# Patient Record
Sex: FEMALE | Race: WHITE | NOT HISPANIC OR LATINO | Employment: FULL TIME | URBAN - METROPOLITAN AREA
[De-identification: names, ages, dates, MRNs, and addresses within clinical notes are randomized per-mention and may not be internally consistent; named-entity substitution may affect disease eponyms.]

---

## 2017-04-21 RX ORDER — DROSPIRENONE AND ETHINYL ESTRADIOL 0.03MG-3MG
1 KIT ORAL DAILY
Qty: 84 TABLET | Refills: 1 | Status: SHIPPED | OUTPATIENT
Start: 2017-04-21 | End: 2017-09-13 | Stop reason: SDUPTHER

## 2017-04-21 NOTE — TELEPHONE ENCOUNTER
Annual appt is sched for 9-7-17 pt had to switch to another dr cause of insurance. Now pt switched to a different insurance that we take and pt will see dr luis.Please call pt if this can't be refilled pt #   [phone]

## 2017-09-14 RX ORDER — DROSPIRENONE AND ETHINYL ESTRADIOL 0.03MG-3MG
KIT ORAL
Qty: 84 TABLET | Refills: 0 | Status: SHIPPED | OUTPATIENT
Start: 2017-09-14 | End: 2017-12-08 | Stop reason: SDUPTHER

## 2017-09-21 ENCOUNTER — OFFICE VISIT (OUTPATIENT)
Dept: OBSTETRICS AND GYNECOLOGY | Facility: CLINIC | Age: 32
End: 2017-09-21
Payer: COMMERCIAL

## 2017-09-21 VITALS
WEIGHT: 131.06 LBS | DIASTOLIC BLOOD PRESSURE: 60 MMHG | SYSTOLIC BLOOD PRESSURE: 106 MMHG | BODY MASS INDEX: 20.57 KG/M2 | HEIGHT: 67 IN

## 2017-09-21 DIAGNOSIS — Z12.4 ENCOUNTER FOR PAPANICOLAOU SMEAR FOR CERVICAL CANCER SCREENING: ICD-10-CM

## 2017-09-21 DIAGNOSIS — Z01.419 ENCOUNTER FOR GYNECOLOGICAL EXAMINATION: Primary | ICD-10-CM

## 2017-09-21 DIAGNOSIS — Z11.51 SCREENING FOR HPV (HUMAN PAPILLOMAVIRUS): ICD-10-CM

## 2017-09-21 PROCEDURE — 99395 PREV VISIT EST AGE 18-39: CPT | Mod: S$GLB,,, | Performed by: OBSTETRICS & GYNECOLOGY

## 2017-09-21 PROCEDURE — 88175 CYTOPATH C/V AUTO FLUID REDO: CPT

## 2017-09-21 PROCEDURE — 99999 PR PBB SHADOW E&M-EST. PATIENT-LVL II: CPT | Mod: PBBFAC,,, | Performed by: OBSTETRICS & GYNECOLOGY

## 2017-09-21 PROCEDURE — 87624 HPV HI-RISK TYP POOLED RSLT: CPT

## 2017-09-21 NOTE — PROGRESS NOTES
Subjective:       Patient ID: Nini Carter is a 31 y.o. female.    Chief Complaint:  Well Woman (last pap 2016 )      History of Present Illness  - here for annual. No complaints. Doing well on ocps. Aunt was diagnosed with breast cancer and is positive for genetic mutation. Patient is asking if she should be tested.    History reviewed. No pertinent past medical history.    Past Surgical History:   Procedure Laterality Date    WISDOM TOOTH EXTRACTION           Current Outpatient Prescriptions:     OCELLA 3-0.03 mg per tablet, TAKE 1 TABLET DAILY, Disp: 84 tablet, Rfl: 0    Review of patient's allergies indicates:   Allergen Reactions    Codeine        GYN & OB History  Patient's last menstrual period was 2017.   Date of Last Pap: No result found    OB History    Para Term  AB Living   0 0 0 0 0 0   SAB TAB Ectopic Multiple Live Births   0 0 0 0 0             Social History     Social History    Marital status: Single     Spouse name: N/A    Number of children: N/A    Years of education: N/A     Occupational History    Not on file.     Social History Main Topics    Smoking status: Never Smoker    Smokeless tobacco: Never Used    Alcohol use No    Drug use: No    Sexual activity: No     Other Topics Concern    Not on file     Social History Narrative    No narrative on file       Family History   Problem Relation Age of Onset    Breast cancer Paternal Grandmother     Ovarian cancer Maternal Grandmother     Breast cancer Maternal Aunt     Colon cancer Neg Hx        Review of Systems  Review of Systems   Respiratory: Negative for shortness of breath.    Cardiovascular: Negative for chest pain and palpitations.   Gastrointestinal: Negative for blood in stool, nausea and vomiting.   Genitourinary:        - see HPI   Skin: Negative for rash and wound.   Allergic/Immunologic: Negative for immunocompromised state.   Neurological: Negative for dizziness and syncope.  "  Hematological: Negative for adenopathy.   Psychiatric/Behavioral: Negative for behavioral problems.        Objective:     Vitals:    09/21/17 1037   BP: 106/60   Weight: 59.5 kg (131 lb 1 oz)   Height: 5' 7" (1.702 m)       Physical Exam:   Constitutional: She is oriented to person, place, and time. She appears well-developed and well-nourished.        Pulmonary/Chest: Right breast exhibits no mass, no nipple discharge, no skin change, no tenderness and no swelling. Left breast exhibits no mass, no nipple discharge, no skin change, no tenderness and no swelling. Breasts are symmetrical.        Abdominal: Soft. She exhibits no distension. There is no tenderness.     Genitourinary: Vagina normal and uterus normal. There is no tenderness or lesion on the right labia. There is no tenderness or lesion on the left labia. Cervix is normal. Right adnexum displays no mass, no tenderness and no fullness. Left adnexum displays no mass, no tenderness and no fullness. No vaginal discharge found. Additional cervical findings: pap smear done          Musculoskeletal: Moves all extremeties.       Neurological: She is alert and oriented to person, place, and time.     Psychiatric: She has a normal mood and affect.        Assessment/ Plan:     Orders Placed This Encounter    HPV High Risk Genotypes, PCR    Liquid-based pap smear, screening       Nini was seen today for well woman.    Diagnoses and all orders for this visit:    Encounter for gynecological examination    Encounter for Papanicolaou smear for cervical cancer screening  -     Liquid-based pap smear, screening    Screening for HPV (human papillomavirus)  -     HPV High Risk Genotypes, PCR    - discussed BRCA testing and genetic counseling. Would strongly recommend counseling before patient considers lab test. Patient would like to defer for now and will let me know if and when she would like to pursue testing.    Return in about 1 year (around 9/21/2018).  "

## 2017-09-26 LAB
HPV HR 12 DNA CVX QL NAA+PROBE: NEGATIVE
HPV16 DNA SPEC QL NAA+PROBE: NEGATIVE
HPV18 DNA SPEC QL NAA+PROBE: NEGATIVE

## 2017-12-11 RX ORDER — DROSPIRENONE AND ETHINYL ESTRADIOL 0.03MG-3MG
KIT ORAL
Qty: 84 TABLET | Refills: 0 | Status: SHIPPED | OUTPATIENT
Start: 2017-12-11 | End: 2018-02-28 | Stop reason: SDUPTHER

## 2018-03-01 RX ORDER — DROSPIRENONE AND ETHINYL ESTRADIOL 0.03MG-3MG
KIT ORAL
Qty: 84 TABLET | Refills: 0 | Status: SHIPPED | OUTPATIENT
Start: 2018-03-01 | End: 2018-05-23 | Stop reason: SDUPTHER

## 2018-05-31 RX ORDER — DROSPIRENONE AND ETHINYL ESTRADIOL 0.03MG-3MG
KIT ORAL
Qty: 84 TABLET | Refills: 0 | Status: SHIPPED | OUTPATIENT
Start: 2018-05-31 | End: 2018-08-23 | Stop reason: SDUPTHER

## 2018-08-24 RX ORDER — DROSPIRENONE AND ETHINYL ESTRADIOL 0.03MG-3MG
KIT ORAL
Qty: 84 TABLET | Refills: 0 | Status: SHIPPED | OUTPATIENT
Start: 2018-08-24 | End: 2018-10-11 | Stop reason: SDUPTHER

## 2018-10-11 ENCOUNTER — OFFICE VISIT (OUTPATIENT)
Dept: OBSTETRICS AND GYNECOLOGY | Facility: CLINIC | Age: 33
End: 2018-10-11
Payer: COMMERCIAL

## 2018-10-11 VITALS
WEIGHT: 130.06 LBS | HEIGHT: 67 IN | BODY MASS INDEX: 20.41 KG/M2 | DIASTOLIC BLOOD PRESSURE: 60 MMHG | SYSTOLIC BLOOD PRESSURE: 100 MMHG

## 2018-10-11 DIAGNOSIS — Z01.419 ENCOUNTER FOR GYNECOLOGICAL EXAMINATION: Primary | ICD-10-CM

## 2018-10-11 PROCEDURE — 99395 PREV VISIT EST AGE 18-39: CPT | Mod: S$GLB,,, | Performed by: OBSTETRICS & GYNECOLOGY

## 2018-10-11 PROCEDURE — 99999 PR PBB SHADOW E&M-EST. PATIENT-LVL II: CPT | Mod: PBBFAC,,, | Performed by: OBSTETRICS & GYNECOLOGY

## 2018-10-11 RX ORDER — DROSPIRENONE AND ETHINYL ESTRADIOL 0.03MG-3MG
1 KIT ORAL DAILY
Qty: 84 TABLET | Refills: 3 | Status: SHIPPED | OUTPATIENT
Start: 2018-10-11 | End: 2019-09-25 | Stop reason: SDUPTHER

## 2018-10-11 NOTE — PROGRESS NOTES
Subjective:       Patient ID: Nini Carter is a 33 y.o. female.    Chief Complaint:  Well Woman (Annual Exam  ---   Last Pap/Hpv 17, Negative  )      History of Present Illness  - here for annual. No complaints. Wedding is next month!    Past Medical History:   Diagnosis Date    Oral contraceptive use        Past Surgical History:   Procedure Laterality Date    WISDOM TOOTH EXTRACTION           Current Outpatient Medications:     OCELLA 3-0.03 mg per tablet, Take 1 tablet by mouth once daily., Disp: 84 tablet, Rfl: 3    Review of patient's allergies indicates:   Allergen Reactions    Codeine Nausea And Vomiting and Anxiety       GYN & OB History  Patient's last menstrual period was 10/04/2018 (exact date).   Date of Last Pap: 2017    OB History    Para Term  AB Living   0 0 0 0 0 0   SAB TAB Ectopic Multiple Live Births   0 0 0 0 0         Obstetric Comments   Menarche 11       Social History     Socioeconomic History    Marital status: Single     Spouse name: Not on file    Number of children: Not on file    Years of education: Not on file    Highest education level: Not on file   Social Needs    Financial resource strain: Not on file    Food insecurity - worry: Not on file    Food insecurity - inability: Not on file    Transportation needs - medical: Not on file    Transportation needs - non-medical: Not on file   Occupational History    Not on file   Tobacco Use    Smoking status: Never Smoker    Smokeless tobacco: Never Used   Substance and Sexual Activity    Alcohol use: Yes     Frequency: Monthly or less     Drinks per session: 1 or 2     Binge frequency: Never    Drug use: No    Sexual activity: Not Currently     Birth control/protection: OCP     Comment: Engaged:  wedding 11-   Other Topics Concern    Not on file   Social History Narrative    Not on file       Family History   Problem Relation Age of Onset    Breast cancer Paternal Grandmother      "Cancer Paternal Grandmother     Ovarian cancer Maternal Grandmother     Cancer Maternal Grandmother     Breast cancer Maternal Aunt     Cancer Maternal Aunt     Colon cancer Neg Hx        Review of Systems  Review of Systems   Respiratory: Negative for shortness of breath.    Cardiovascular: Negative for chest pain and palpitations.   Gastrointestinal: Negative for blood in stool, nausea and vomiting.   Genitourinary:        - see HPI   Skin: Negative for rash and wound.   Allergic/Immunologic: Negative for immunocompromised state.   Neurological: Negative for dizziness and syncope.   Hematological: Negative for adenopathy.   Psychiatric/Behavioral: Negative for behavioral problems.        Objective:     Vitals:    10/11/18 1030   BP: 100/60   Weight: 59 kg (130 lb 1.1 oz)   Height: 5' 7" (1.702 m)       Physical Exam:   Constitutional: She is oriented to person, place, and time. She appears well-developed and well-nourished.        Pulmonary/Chest: Right breast exhibits no mass, no nipple discharge, no skin change, no tenderness and no swelling. Left breast exhibits no mass, no nipple discharge, no skin change, no tenderness and no swelling. Breasts are symmetrical.        Abdominal: Soft. She exhibits no distension. There is no tenderness.     Genitourinary: Vagina normal and uterus normal. There is no tenderness or lesion on the right labia. There is no tenderness or lesion on the left labia. Cervix is normal. Right adnexum displays no mass, no tenderness and no fullness. Left adnexum displays no mass, no tenderness and no fullness. No vaginal discharge found.           Musculoskeletal: Moves all extremeties.       Neurological: She is alert and oriented to person, place, and time.     Psychiatric: She has a normal mood and affect.        Assessment/ Plan:     Orders Placed This Encounter    OCELLA 3-0.03 mg per tablet       Nini was seen today for well woman.    Diagnoses and all orders for this " visit:    Encounter for gynecological examination    Other orders  -     OCELLA 3-0.03 mg per tablet; Take 1 tablet by mouth once daily.    - discussed deferring genetic counseling for BRCA for now. Wedding is next month. May want children soon.    Follow-up in about 1 year (around 10/11/2019) for annual exam.

## 2019-09-26 RX ORDER — DROSPIRENONE AND ETHINYL ESTRADIOL 0.03MG-3MG
KIT ORAL
Qty: 84 TABLET | Refills: 4 | Status: SHIPPED | OUTPATIENT
Start: 2019-09-26 | End: 2019-10-17 | Stop reason: SDUPTHER

## 2019-10-17 ENCOUNTER — OFFICE VISIT (OUTPATIENT)
Dept: OBSTETRICS AND GYNECOLOGY | Facility: CLINIC | Age: 34
End: 2019-10-17
Payer: COMMERCIAL

## 2019-10-17 VITALS
WEIGHT: 128.31 LBS | BODY MASS INDEX: 20.14 KG/M2 | HEIGHT: 67 IN | DIASTOLIC BLOOD PRESSURE: 60 MMHG | SYSTOLIC BLOOD PRESSURE: 112 MMHG

## 2019-10-17 DIAGNOSIS — Z01.419 ENCOUNTER FOR GYNECOLOGICAL EXAMINATION: Primary | ICD-10-CM

## 2019-10-17 PROCEDURE — 99999 PR PBB SHADOW E&M-EST. PATIENT-LVL III: ICD-10-PCS | Mod: PBBFAC,,, | Performed by: OBSTETRICS & GYNECOLOGY

## 2019-10-17 PROCEDURE — 99395 PR PREVENTIVE VISIT,EST,18-39: ICD-10-PCS | Mod: S$GLB,,, | Performed by: OBSTETRICS & GYNECOLOGY

## 2019-10-17 PROCEDURE — 99999 PR PBB SHADOW E&M-EST. PATIENT-LVL III: CPT | Mod: PBBFAC,,, | Performed by: OBSTETRICS & GYNECOLOGY

## 2019-10-17 PROCEDURE — 99395 PREV VISIT EST AGE 18-39: CPT | Mod: S$GLB,,, | Performed by: OBSTETRICS & GYNECOLOGY

## 2019-10-17 RX ORDER — DROSPIRENONE AND ETHINYL ESTRADIOL 0.03MG-3MG
1 KIT ORAL DAILY
Qty: 84 TABLET | Refills: 3 | Status: SHIPPED | OUTPATIENT
Start: 2019-10-17 | End: 2020-04-13 | Stop reason: SDUPTHER

## 2019-10-17 NOTE — PROGRESS NOTES
Subjective:       Patient ID: Nini Carter is a 34 y.o. female.    Chief Complaint:  Well Woman (pap nl/hpv- 2017  declines baseline mammogram)      History of Present Illness  - here for annual. Declines baseline mammogram. Asking if thermogram is a reasonable alternative.  -  for a year! Considering pregnancy this next year.    Past Medical History:   Diagnosis Date    Oral contraceptive use        Past Surgical History:   Procedure Laterality Date    WISDOM TOOTH EXTRACTION           Current Outpatient Medications:     OCELLA 3-0.03 mg per tablet, Take 1 tablet by mouth once daily., Disp: 84 tablet, Rfl: 3    Review of patient's allergies indicates:   Allergen Reactions    Codeine Nausea And Vomiting and Anxiety       GYN & OB History  Patient's last menstrual period was 10/03/2019.   Date of Last Pap: 2017    OB History    Para Term  AB Living   0 0 0 0 0 0   SAB TAB Ectopic Multiple Live Births   0 0 0 0 0   Obstetric Comments   Menarche 11       Social History     Socioeconomic History    Marital status: Single     Spouse name: Not on file    Number of children: Not on file    Years of education: Not on file    Highest education level: Not on file   Occupational History    Not on file   Social Needs    Financial resource strain: Not on file    Food insecurity:     Worry: Not on file     Inability: Not on file    Transportation needs:     Medical: Not on file     Non-medical: Not on file   Tobacco Use    Smoking status: Never Smoker    Smokeless tobacco: Never Used   Substance and Sexual Activity    Alcohol use: Yes     Frequency: Monthly or less     Drinks per session: 1 or 2     Binge frequency: Never    Drug use: No    Sexual activity: Yes     Birth control/protection: OCP     Comment: Engaged:  wedding 11-   Lifestyle    Physical activity:     Days per week: Not on file     Minutes per session: Not on file    Stress: Not on file   Relationships     "Social connections:     Talks on phone: Not on file     Gets together: Not on file     Attends Mosque service: Not on file     Active member of club or organization: Not on file     Attends meetings of clubs or organizations: Not on file     Relationship status: Not on file   Other Topics Concern    Not on file   Social History Narrative    Not on file       Family History   Problem Relation Age of Onset    Breast cancer Paternal Grandmother     Cancer Paternal Grandmother     Ovarian cancer Maternal Grandmother     Cancer Maternal Grandmother     Breast cancer Maternal Aunt     Cancer Maternal Aunt     Colon cancer Neg Hx        Review of Systems  Review of Systems   Respiratory: Negative for shortness of breath.    Cardiovascular: Negative for chest pain and palpitations.   Gastrointestinal: Negative for blood in stool, nausea and vomiting.   Genitourinary:        - see HPI   Skin: Negative for rash and wound.   Allergic/Immunologic: Negative for immunocompromised state.   Neurological: Negative for dizziness and syncope.   Hematological: Negative for adenopathy.   Psychiatric/Behavioral: Negative for behavioral problems.        Objective:     Vitals:    10/17/19 0927   BP: 112/60   Weight: 58.2 kg (128 lb 4.9 oz)   Height: 5' 7" (1.702 m)       Physical Exam:   Constitutional: She is oriented to person, place, and time. She appears well-developed and well-nourished.        Pulmonary/Chest: Right breast exhibits no mass, no nipple discharge, no skin change, no tenderness and no swelling. Left breast exhibits no mass, no nipple discharge, no skin change, no tenderness and no swelling. Breasts are symmetrical.        Abdominal: Soft. She exhibits no distension. There is no tenderness.     Genitourinary: Vagina normal and uterus normal. There is no tenderness or lesion on the right labia. There is no tenderness or lesion on the left labia. Cervix is normal. Right adnexum displays no mass, no tenderness " and no fullness. Left adnexum displays no mass, no tenderness and no fullness. No vaginal discharge found.           Musculoskeletal: Moves all extremeties.       Neurological: She is alert and oriented to person, place, and time.     Psychiatric: She has a normal mood and affect.        Assessment/ Plan:     Orders Placed This Encounter    OCELLA 3-0.03 mg per tablet       Nini was seen today for well woman.    Diagnoses and all orders for this visit:    Encounter for gynecological examination    Other orders  -     OCELLA 3-0.03 mg per tablet; Take 1 tablet by mouth once daily.    - discussed Rubella immunity. Offered test to confirm.  - start PNV daily 3 months prior to stopping birth control method.  - discussed [employer] for genetic carrier screening and gave pamphlet.   - discussed checking cdc.gov for travel restrictions with Zika.  - discussed AMA    - discussed how thermogram is not an approved alternative for mammogram.    Follow up in about 1 year (around 10/17/2020) for annual exam.

## 2019-10-22 ENCOUNTER — TELEPHONE (OUTPATIENT)
Dept: OBSTETRICS AND GYNECOLOGY | Facility: CLINIC | Age: 34
End: 2019-10-22

## 2019-10-22 NOTE — TELEPHONE ENCOUNTER
Pt called to schedule the genetic carrier testing and they said there are a few different options and she would like to know which specific one she is supposed to be scheduling for.

## 2020-04-13 RX ORDER — DROSPIRENONE AND ETHINYL ESTRADIOL 0.03MG-3MG
1 KIT ORAL DAILY
Qty: 84 TABLET | Refills: 1 | Status: SHIPPED | OUTPATIENT
Start: 2020-04-13 | End: 2020-11-04

## 2020-04-13 NOTE — TELEPHONE ENCOUNTER
Pt last annual was 10/17/19 and requesting Rx to be sent to mail order pharmacy. Please review and sign

## 2020-11-04 ENCOUNTER — OFFICE VISIT (OUTPATIENT)
Dept: OBSTETRICS AND GYNECOLOGY | Facility: CLINIC | Age: 35
End: 2020-11-04
Attending: OBSTETRICS & GYNECOLOGY

## 2020-11-04 VITALS
WEIGHT: 127.88 LBS | BODY MASS INDEX: 20.07 KG/M2 | DIASTOLIC BLOOD PRESSURE: 78 MMHG | HEIGHT: 67 IN | TEMPERATURE: 98 F | SYSTOLIC BLOOD PRESSURE: 110 MMHG

## 2020-11-04 DIAGNOSIS — Z01.419 ENCOUNTER FOR GYNECOLOGICAL EXAMINATION: Primary | ICD-10-CM

## 2020-11-04 DIAGNOSIS — Z11.51 ENCOUNTER FOR SCREENING FOR HUMAN PAPILLOMAVIRUS (HPV): ICD-10-CM

## 2020-11-04 DIAGNOSIS — Z12.4 ENCOUNTER FOR PAPANICOLAOU SMEAR FOR CERVICAL CANCER SCREENING: ICD-10-CM

## 2020-11-04 PROCEDURE — 88175 CYTOPATH C/V AUTO FLUID REDO: CPT

## 2020-11-04 PROCEDURE — 99999 PR PBB SHADOW E&M-EST. PATIENT-LVL III: ICD-10-PCS | Mod: PBBFAC,,, | Performed by: OBSTETRICS & GYNECOLOGY

## 2020-11-04 PROCEDURE — 99395 PR PREVENTIVE VISIT,EST,18-39: ICD-10-PCS | Mod: S$PBB,,, | Performed by: OBSTETRICS & GYNECOLOGY

## 2020-11-04 PROCEDURE — 87624 HPV HI-RISK TYP POOLED RSLT: CPT

## 2020-11-04 PROCEDURE — 99395 PREV VISIT EST AGE 18-39: CPT | Mod: S$PBB,,, | Performed by: OBSTETRICS & GYNECOLOGY

## 2020-11-04 PROCEDURE — 99999 PR PBB SHADOW E&M-EST. PATIENT-LVL III: CPT | Mod: PBBFAC,,, | Performed by: OBSTETRICS & GYNECOLOGY

## 2020-11-04 PROCEDURE — 99213 OFFICE O/P EST LOW 20 MIN: CPT | Mod: PBBFAC,PN | Performed by: OBSTETRICS & GYNECOLOGY

## 2020-11-04 NOTE — PROGRESS NOTES
LMP: Patient's last menstrual period was 10/22/2020..    Contraception: The current method of family planning is none  Meds per MD: none    Last Pap: 9- pap & hpv negative

## 2020-11-04 NOTE — PROGRESS NOTES
Subjective:       Patient ID: Nini Carter is a 35 y.o. female.    Chief Complaint:  Well Woman (Annual Exam, Last pap/hpv 2017 negative. off ocp's this month to try for pregnancy.)      History of Present Illness  - here for annual. Stopped ocps about 3 weeks ago. Taking PNV. Bought an opk.    Past Medical History:   Diagnosis Date    Oral contraceptive use        Past Surgical History:   Procedure Laterality Date    WISDOM TOOTH EXTRACTION         No current outpatient medications on file.    Review of patient's allergies indicates:   Allergen Reactions    Codeine Nausea And Vomiting and Anxiety       GYN & OB History  Patient's last menstrual period was 10/22/2020.   Date of Last Pap: No result found    OB History    Para Term  AB Living   0 0 0 0 0 0   SAB TAB Ectopic Multiple Live Births   0 0 0 0 0   Obstetric Comments   Menarche 11       Social History     Socioeconomic History    Marital status: Single     Spouse name: Not on file    Number of children: Not on file    Years of education: Not on file    Highest education level: Not on file   Occupational History    Not on file   Social Needs    Financial resource strain: Not on file    Food insecurity     Worry: Not on file     Inability: Not on file    Transportation needs     Medical: Not on file     Non-medical: Not on file   Tobacco Use    Smoking status: Never Smoker    Smokeless tobacco: Never Used   Substance and Sexual Activity    Alcohol use: Yes     Frequency: Monthly or less     Drinks per session: 1 or 2     Binge frequency: Never    Drug use: No    Sexual activity: Yes     Birth control/protection: None     Comment: Engaged:  wedding 11-   Lifestyle    Physical activity     Days per week: Not on file     Minutes per session: Not on file    Stress: Not on file   Relationships    Social connections     Talks on phone: Not on file     Gets together: Not on file     Attends Islam service: Not on  "file     Active member of club or organization: Not on file     Attends meetings of clubs or organizations: Not on file     Relationship status: Not on file   Other Topics Concern    Not on file   Social History Narrative    Not on file       Family History   Problem Relation Age of Onset    Breast cancer Paternal Grandmother     Cancer Paternal Grandmother     Ovarian cancer Maternal Grandmother     Cancer Maternal Grandmother     Breast cancer Maternal Aunt     Cancer Maternal Aunt     Colon cancer Neg Hx        Review of Systems  Review of Systems   Respiratory: Negative for shortness of breath.    Cardiovascular: Negative for chest pain and palpitations.   Gastrointestinal: Negative for blood in stool, nausea and vomiting.   Genitourinary:        - see HPI   Skin: Negative for rash and wound.   Allergic/Immunologic: Negative for immunocompromised state.   Neurological: Negative for dizziness and syncope.   Hematological: Negative for adenopathy.   Psychiatric/Behavioral: Negative for behavioral problems.        Objective:     Vitals:    11/04/20 1330   BP: 110/78   Temp: 97.7 °F (36.5 °C)   Weight: 58 kg (127 lb 13.9 oz)   Height: 5' 7" (1.702 m)       Physical Exam:   Constitutional: She is oriented to person, place, and time. She appears well-developed and well-nourished.        Pulmonary/Chest: Right breast exhibits no mass, no nipple discharge, no skin change, no tenderness and no swelling. Left breast exhibits no mass, no nipple discharge, no skin change, no tenderness and no swelling. Breasts are symmetrical.        Abdominal: Soft. She exhibits no distension. There is no abdominal tenderness.     Genitourinary:    Vagina and uterus normal.   There is no tenderness or lesion on the right labia. There is no tenderness or lesion on the left labia. Cervix is normal. Right adnexum displays no mass, no tenderness and no fullness. Left adnexum displays no mass, no tenderness and no fullness. Additional " cervical findings: pap smear donenegative for vaginal discharge          Musculoskeletal: Moves all extremeties.       Neurological: She is alert and oriented to person, place, and time.     Psychiatric: She has a normal mood and affect.        Assessment/ Plan:     Orders Placed This Encounter    HPV High Risk Genotypes, PCR    Liquid-Based Pap Smear, Screening       Nini was seen today for well woman.    Diagnoses and all orders for this visit:    Encounter for gynecological examination    Encounter for screening for human papillomavirus (HPV)  -     HPV High Risk Genotypes, PCR    Encounter for Papanicolaou smear for cervical cancer screening  -     Liquid-Based Pap Smear, Screening    - discussed timing of ovulation. Patient will take a UPT if misses her period and call me. She'll keep me posted. Discussed how we would start investigating if not pregnant after 6 months of trying since she's older than 35, if she would like. Verbalized understanding.    Follow up in about 1 year (around 11/4/2021) for annual exam.

## 2020-11-13 LAB
HPV HR 12 DNA SPEC QL NAA+PROBE: NEGATIVE
HPV16 AG SPEC QL: NEGATIVE
HPV18 DNA SPEC QL NAA+PROBE: NEGATIVE

## 2020-12-01 ENCOUNTER — TELEPHONE (OUTPATIENT)
Dept: OBSTETRICS AND GYNECOLOGY | Facility: CLINIC | Age: 35
End: 2020-12-01

## 2020-12-02 NOTE — TELEPHONE ENCOUNTER
Pt stopped OCP 6 weeks ago.  Had withdrawal bleeding.  Started spotting on Friday, continued to spot but bleeding got heavier today.  Thinks she is starting her period.  She is trying to conceive.  Advised the first day of cycle is the first day she has to wear a pad/tampon.  Instructed her to call back if not pregnant in 6 months.  Reassured her it can take 3 months to regulate after getting off OCPs.  Will keep us posted.

## 2020-12-11 ENCOUNTER — TELEPHONE (OUTPATIENT)
Dept: OBSTETRICS AND GYNECOLOGY | Facility: CLINIC | Age: 35
End: 2020-12-11

## 2020-12-11 LAB
FINAL PATHOLOGIC DIAGNOSIS: NORMAL
Lab: NORMAL

## 2020-12-11 NOTE — TELEPHONE ENCOUNTER
----- Message from Tala Osuna MD sent at 12/11/2020 12:57 PM CST -----  Call patient. Normal Pap and Negative HPV. Annual in one year. Repeat Pap in 3 years.

## 2020-12-11 NOTE — TELEPHONE ENCOUNTER
Lilian Edgar S Staff 13 minutes ago (2:14 PM)     Pt returning Anni's phone call back, informed pt of normal pap and HPV and advised for repeat annual in 1 year and pap in 3 yrs. Pt understood and acknowledged. Thank you!     Routing comment

## 2021-12-01 ENCOUNTER — OFFICE VISIT (OUTPATIENT)
Dept: OBSTETRICS AND GYNECOLOGY | Facility: CLINIC | Age: 36
End: 2021-12-01
Attending: OBSTETRICS & GYNECOLOGY
Payer: COMMERCIAL

## 2021-12-01 VITALS
WEIGHT: 130.06 LBS | SYSTOLIC BLOOD PRESSURE: 100 MMHG | HEIGHT: 67 IN | DIASTOLIC BLOOD PRESSURE: 74 MMHG | BODY MASS INDEX: 20.41 KG/M2

## 2021-12-01 DIAGNOSIS — Z01.419 ENCOUNTER FOR GYNECOLOGICAL EXAMINATION: Primary | ICD-10-CM

## 2021-12-01 DIAGNOSIS — N93.8 DUB (DYSFUNCTIONAL UTERINE BLEEDING): ICD-10-CM

## 2021-12-01 PROCEDURE — 99395 PREV VISIT EST AGE 18-39: CPT | Mod: S$GLB,,, | Performed by: OBSTETRICS & GYNECOLOGY

## 2021-12-01 PROCEDURE — 99395 PR PREVENTIVE VISIT,EST,18-39: ICD-10-PCS | Mod: S$GLB,,, | Performed by: OBSTETRICS & GYNECOLOGY

## 2021-12-01 PROCEDURE — 99999 PR PBB SHADOW E&M-EST. PATIENT-LVL III: ICD-10-PCS | Mod: PBBFAC,,, | Performed by: OBSTETRICS & GYNECOLOGY

## 2021-12-01 PROCEDURE — 99999 PR PBB SHADOW E&M-EST. PATIENT-LVL III: CPT | Mod: PBBFAC,,, | Performed by: OBSTETRICS & GYNECOLOGY

## 2021-12-17 ENCOUNTER — TELEPHONE (OUTPATIENT)
Dept: OBSTETRICS AND GYNECOLOGY | Facility: CLINIC | Age: 36
End: 2021-12-17
Payer: COMMERCIAL

## 2021-12-21 ENCOUNTER — TELEPHONE (OUTPATIENT)
Dept: OBSTETRICS AND GYNECOLOGY | Facility: CLINIC | Age: 36
End: 2021-12-21
Payer: COMMERCIAL

## 2021-12-27 ENCOUNTER — HOSPITAL ENCOUNTER (OUTPATIENT)
Dept: RADIOLOGY | Facility: HOSPITAL | Age: 36
Discharge: HOME | End: 2021-12-27
Attending: OBSTETRICS & GYNECOLOGY
Payer: COMMERCIAL

## 2021-12-27 DIAGNOSIS — N93.8 DUB (DYSFUNCTIONAL UTERINE BLEEDING): ICD-10-CM

## 2021-12-27 PROCEDURE — 76830 US PELVIS COMP WITH TRANSVAG NON-OB (XPD): ICD-10-PCS | Mod: 26,,, | Performed by: RADIOLOGY

## 2021-12-27 PROCEDURE — 76856 US EXAM PELVIC COMPLETE: CPT | Mod: 26,,, | Performed by: RADIOLOGY

## 2021-12-27 PROCEDURE — 76856 US EXAM PELVIC COMPLETE: CPT | Mod: TC

## 2021-12-27 PROCEDURE — 76830 TRANSVAGINAL US NON-OB: CPT | Mod: 26,,, | Performed by: RADIOLOGY

## 2021-12-27 PROCEDURE — 76856 US PELVIS COMP WITH TRANSVAG NON-OB (XPD): ICD-10-PCS | Mod: 26,,, | Performed by: RADIOLOGY

## 2022-11-07 ENCOUNTER — TELEPHONE (OUTPATIENT)
Dept: OBSTETRICS AND GYNECOLOGY | Facility: CLINIC | Age: 37
End: 2022-11-07
Payer: COMMERCIAL

## 2022-11-07 NOTE — TELEPHONE ENCOUNTER
6 and 1 OB pt reports intermittent spotting with light cramping.  Currently taking progesterone for low progesterone levels.  Has strained with bowel movement within the last 2 days.  Denies pain.      Advised pt that it could be from straining and to monitor for any period-like bleeding.  Advised to increase fluids for cramps.    Preg confirm appt with US on 12/2.    Do you think she needs to get a T&S or anything?

## 2022-11-07 NOTE — TELEPHONE ENCOUNTER
Pt had labs drawn at [facility] and has been going every week.  Last HCG level was 05739 and progesterone was 24.5.  Not sure what her T&S is.  Not currently at home but will go home and check it.    Advised per Dr. Osuna to continue the progesterone and to get back to me regarding her T&S.  Pt verbalized understanding.

## 2022-11-18 ENCOUNTER — TELEPHONE (OUTPATIENT)
Dept: OBSTETRICS AND GYNECOLOGY | Facility: CLINIC | Age: 37
End: 2022-11-18
Payer: COMMERCIAL

## 2022-11-18 NOTE — TELEPHONE ENCOUNTER
Newly pregnant pt reports feeling nauseous for the last few weeks.  Not vomiting.  Inquiring about Emetrol or Diclegis.  Pt also states her bleeding stopped.  Still doesn't know blood type.    Advised she can try Emetrol OTC or Unisom-vit B combo.  Advised to call back if zofran needed.  Advised to let us know if bleeding occurs again.  Otherwise monitor and she will get first trimester labs when she comes in for her appt.  Pt verbalized understanding.

## 2022-12-02 ENCOUNTER — PROCEDURE VISIT (OUTPATIENT)
Dept: OBSTETRICS AND GYNECOLOGY | Facility: CLINIC | Age: 37
End: 2022-12-02
Payer: COMMERCIAL

## 2022-12-02 ENCOUNTER — OFFICE VISIT (OUTPATIENT)
Dept: OBSTETRICS AND GYNECOLOGY | Facility: CLINIC | Age: 37
End: 2022-12-02
Attending: STUDENT IN AN ORGANIZED HEALTH CARE EDUCATION/TRAINING PROGRAM
Payer: COMMERCIAL

## 2022-12-02 ENCOUNTER — LAB VISIT (OUTPATIENT)
Dept: LAB | Facility: HOSPITAL | Age: 37
End: 2022-12-02
Attending: STUDENT IN AN ORGANIZED HEALTH CARE EDUCATION/TRAINING PROGRAM
Payer: COMMERCIAL

## 2022-12-02 ENCOUNTER — PATIENT MESSAGE (OUTPATIENT)
Dept: OBSTETRICS AND GYNECOLOGY | Facility: CLINIC | Age: 37
End: 2022-12-02

## 2022-12-02 VITALS
HEIGHT: 67 IN | SYSTOLIC BLOOD PRESSURE: 113 MMHG | WEIGHT: 129.88 LBS | DIASTOLIC BLOOD PRESSURE: 61 MMHG | BODY MASS INDEX: 20.38 KG/M2

## 2022-12-02 DIAGNOSIS — Z32.01 POSITIVE URINE PREGNANCY TEST: ICD-10-CM

## 2022-12-02 DIAGNOSIS — Z32.01 POSITIVE PREGNANCY TEST: ICD-10-CM

## 2022-12-02 DIAGNOSIS — Z32.01 POSITIVE URINE PREGNANCY TEST: Primary | ICD-10-CM

## 2022-12-02 DIAGNOSIS — N92.6 MISSED MENSES: Primary | ICD-10-CM

## 2022-12-02 PROBLEM — E55.9 VITAMIN D DEFICIENCY: Status: ACTIVE | Noted: 2022-04-29

## 2022-12-02 PROBLEM — R79.0 LOW FERRITIN: Status: ACTIVE | Noted: 2022-04-29

## 2022-12-02 PROBLEM — E31.9 POLYGLANDULAR DYSFUNCTION: Status: ACTIVE | Noted: 2022-04-29

## 2022-12-02 LAB
BASOPHILS # BLD AUTO: 0.05 K/UL (ref 0–0.2)
BASOPHILS NFR BLD: 0.7 % (ref 0–1.9)
DIFFERENTIAL METHOD: ABNORMAL
EOSINOPHIL # BLD AUTO: 0 K/UL (ref 0–0.5)
EOSINOPHIL NFR BLD: 0.4 % (ref 0–8)
ERYTHROCYTE [DISTWIDTH] IN BLOOD BY AUTOMATED COUNT: 13.7 % (ref 11.5–14.5)
HBV SURFACE AG SERPL QL IA: NORMAL
HCT VFR BLD AUTO: 37 % (ref 37–48.5)
HCV AB SERPL QL IA: NORMAL
HGB BLD-MCNC: 12.5 G/DL (ref 12–16)
HIV 1+2 AB+HIV1 P24 AG SERPL QL IA: NORMAL
IMM GRANULOCYTES # BLD AUTO: 0.03 K/UL (ref 0–0.04)
IMM GRANULOCYTES NFR BLD AUTO: 0.4 % (ref 0–0.5)
LYMPHOCYTES # BLD AUTO: 1.5 K/UL (ref 1–4.8)
LYMPHOCYTES NFR BLD: 21 % (ref 18–48)
MCH RBC QN AUTO: 31.9 PG (ref 27–31)
MCHC RBC AUTO-ENTMCNC: 33.8 G/DL (ref 32–36)
MCV RBC AUTO: 94 FL (ref 82–98)
MONOCYTES # BLD AUTO: 0.7 K/UL (ref 0.3–1)
MONOCYTES NFR BLD: 10.2 % (ref 4–15)
NEUTROPHILS # BLD AUTO: 4.7 K/UL (ref 1.8–7.7)
NEUTROPHILS NFR BLD: 67.3 % (ref 38–73)
NRBC BLD-RTO: 0 /100 WBC
PLATELET # BLD AUTO: 163 K/UL (ref 150–450)
PMV BLD AUTO: 11.5 FL (ref 9.2–12.9)
RBC # BLD AUTO: 3.92 M/UL (ref 4–5.4)
RH BLD: NORMAL
WBC # BLD AUTO: 6.95 K/UL (ref 3.9–12.7)

## 2022-12-02 PROCEDURE — 99213 PR OFFICE/OUTPT VISIT, EST, LEVL III, 20-29 MIN: ICD-10-PCS | Mod: S$GLB,,, | Performed by: STUDENT IN AN ORGANIZED HEALTH CARE EDUCATION/TRAINING PROGRAM

## 2022-12-02 PROCEDURE — 3008F BODY MASS INDEX DOCD: CPT | Mod: CPTII,S$GLB,, | Performed by: STUDENT IN AN ORGANIZED HEALTH CARE EDUCATION/TRAINING PROGRAM

## 2022-12-02 PROCEDURE — 76801 US OB/GYN EXTENDED PROCEDURE (VIEWPOINT): ICD-10-PCS | Mod: S$GLB,,, | Performed by: OBSTETRICS & GYNECOLOGY

## 2022-12-02 PROCEDURE — 86592 SYPHILIS TEST NON-TREP QUAL: CPT | Performed by: STUDENT IN AN ORGANIZED HEALTH CARE EDUCATION/TRAINING PROGRAM

## 2022-12-02 PROCEDURE — 3078F PR MOST RECENT DIASTOLIC BLOOD PRESSURE < 80 MM HG: ICD-10-PCS | Mod: CPTII,S$GLB,, | Performed by: STUDENT IN AN ORGANIZED HEALTH CARE EDUCATION/TRAINING PROGRAM

## 2022-12-02 PROCEDURE — 1159F PR MEDICATION LIST DOCUMENTED IN MEDICAL RECORD: ICD-10-PCS | Mod: CPTII,S$GLB,, | Performed by: STUDENT IN AN ORGANIZED HEALTH CARE EDUCATION/TRAINING PROGRAM

## 2022-12-02 PROCEDURE — 99213 OFFICE O/P EST LOW 20 MIN: CPT | Mod: S$GLB,,, | Performed by: STUDENT IN AN ORGANIZED HEALTH CARE EDUCATION/TRAINING PROGRAM

## 2022-12-02 PROCEDURE — 3074F PR MOST RECENT SYSTOLIC BLOOD PRESSURE < 130 MM HG: ICD-10-PCS | Mod: CPTII,S$GLB,, | Performed by: STUDENT IN AN ORGANIZED HEALTH CARE EDUCATION/TRAINING PROGRAM

## 2022-12-02 PROCEDURE — 3074F SYST BP LT 130 MM HG: CPT | Mod: CPTII,S$GLB,, | Performed by: STUDENT IN AN ORGANIZED HEALTH CARE EDUCATION/TRAINING PROGRAM

## 2022-12-02 PROCEDURE — 86901 BLOOD TYPING SEROLOGIC RH(D): CPT | Performed by: STUDENT IN AN ORGANIZED HEALTH CARE EDUCATION/TRAINING PROGRAM

## 2022-12-02 PROCEDURE — 3078F DIAST BP <80 MM HG: CPT | Mod: CPTII,S$GLB,, | Performed by: STUDENT IN AN ORGANIZED HEALTH CARE EDUCATION/TRAINING PROGRAM

## 2022-12-02 PROCEDURE — 99999 PR PBB SHADOW E&M-EST. PATIENT-LVL III: CPT | Mod: PBBFAC,,, | Performed by: STUDENT IN AN ORGANIZED HEALTH CARE EDUCATION/TRAINING PROGRAM

## 2022-12-02 PROCEDURE — 86901 BLOOD TYPING SEROLOGIC RH(D): CPT | Mod: 91 | Performed by: STUDENT IN AN ORGANIZED HEALTH CARE EDUCATION/TRAINING PROGRAM

## 2022-12-02 PROCEDURE — 76801 OB US < 14 WKS SINGLE FETUS: CPT | Mod: S$GLB,,, | Performed by: OBSTETRICS & GYNECOLOGY

## 2022-12-02 PROCEDURE — 87340 HEPATITIS B SURFACE AG IA: CPT | Performed by: STUDENT IN AN ORGANIZED HEALTH CARE EDUCATION/TRAINING PROGRAM

## 2022-12-02 PROCEDURE — 86803 HEPATITIS C AB TEST: CPT | Performed by: STUDENT IN AN ORGANIZED HEALTH CARE EDUCATION/TRAINING PROGRAM

## 2022-12-02 PROCEDURE — 87389 HIV-1 AG W/HIV-1&-2 AB AG IA: CPT | Performed by: STUDENT IN AN ORGANIZED HEALTH CARE EDUCATION/TRAINING PROGRAM

## 2022-12-02 PROCEDURE — 3008F PR BODY MASS INDEX (BMI) DOCUMENTED: ICD-10-PCS | Mod: CPTII,S$GLB,, | Performed by: STUDENT IN AN ORGANIZED HEALTH CARE EDUCATION/TRAINING PROGRAM

## 2022-12-02 PROCEDURE — 86762 RUBELLA ANTIBODY: CPT | Performed by: STUDENT IN AN ORGANIZED HEALTH CARE EDUCATION/TRAINING PROGRAM

## 2022-12-02 PROCEDURE — 1159F MED LIST DOCD IN RCRD: CPT | Mod: CPTII,S$GLB,, | Performed by: STUDENT IN AN ORGANIZED HEALTH CARE EDUCATION/TRAINING PROGRAM

## 2022-12-02 PROCEDURE — 85025 COMPLETE CBC W/AUTO DIFF WBC: CPT | Performed by: STUDENT IN AN ORGANIZED HEALTH CARE EDUCATION/TRAINING PROGRAM

## 2022-12-02 PROCEDURE — 99999 PR PBB SHADOW E&M-EST. PATIENT-LVL III: ICD-10-PCS | Mod: PBBFAC,,, | Performed by: STUDENT IN AN ORGANIZED HEALTH CARE EDUCATION/TRAINING PROGRAM

## 2022-12-02 RX ORDER — PROGESTERONE 200 MG/1
CAPSULE ORAL
COMMUNITY
Start: 2022-10-28 | End: 2023-02-23

## 2022-12-02 NOTE — PROGRESS NOTES
"Chief Complaint: Absence of Menses     HPI:      Nini Carter is a 37 y.o.  who presents complaining of absence of menses. She reports some baseline nausea. No vomiting. Denies abdominal pain, bleeding,  or GI sxs. LMP 22.      Pregnancy History:       GYN:  Last pap 2020 NILM -HPV    History reviewed. No pertinent past medical history.    Past Surgical History:   Procedure Laterality Date    WISDOM TOOTH EXTRACTION       Social History     Tobacco Use    Smoking status: Never    Smokeless tobacco: Never   Substance Use Topics    Alcohol use: Yes    Drug use: No     Family History   Problem Relation Age of Onset    Breast cancer Paternal Grandmother     Cancer Paternal Grandmother     Ovarian cancer Maternal Grandmother     Cancer Maternal Grandmother     Breast cancer Maternal Aunt     Cancer Maternal Aunt     Colon cancer Neg Hx      OB History    Para Term  AB Living   1 0 0 0 0 0   SAB IAB Ectopic Multiple Live Births   0 0 0 0 0      # Outcome Date GA Lbr Danilo/2nd Weight Sex Delivery Anes PTL Lv   1 Current               Obstetric Comments   Menarche 11       Physical Exam:      PHYSICAL EXAM:  /61   Ht 5' 7" (1.702 m)   Wt 58.9 kg (129 lb 13.6 oz)   BMI 20.34 kg/m²   Body mass index is 20.34 kg/m².     APPEARANCE: Well nourished, well developed, in no acute distress.    Assessment/Plan:     Nini was seen today for possible pregnancy.    Diagnoses and all orders for this visit:    Missed menses    Positive pregnancy test  -     Hepatitis C Antibody; Future  -     HIV-1 and HIV-2 antibodies; Future  -     RPR; Future  -     Hepatitis B surface antigen; Future  -     Type & Screen; Future  -     Rubella antibody, IgG; Future  -     Urine culture  -     CBC auto differential; Future  -     C. trachomatis/N. gonorrhoeae by AMP DNA [facility]; Urine    - US with IUP +FHR DIONE 23, EGA today 9w6d  - PNL today  - AMA: desires [id number], pamphlet provided, will " have scheduled for next week  - counseling as below, pregnancy pamphlet provided  - discussed unisom/B6 for nausea and to let us know if worsens  - RTC with Dr Osuna in 4 wks    Counselin. Patient was counseled today on proper weight gain based on the [employer]'s recommendations based on her pre-pregnancy weight.   2. Discussed dietary recommendations.  3. Encouraged compliance with prenatal vitamins.  4. Optional genetic testing discussed.   5. Safety of exercise discussed with patient, and continued active lifestyle encouraged.  6. Normal course of prenatal care reviewed.  7. Medications safe in pregnancy discussed and list provided.    Use of the [facility] Patient Portal discussed and encouraged during today's visit.      Salma Kendrick MD  Obstetrics and Gynecology  [facility]

## 2022-12-03 LAB
ABO + RH BLD: NORMAL
BLD GP AB SCN CELLS X3 SERPL QL: NORMAL
RPR SER QL: NORMAL

## 2022-12-05 LAB
RUBV IGG SER-ACNC: 11.9 IU/ML
RUBV IGG SER-IMP: REACTIVE

## 2022-12-27 ENCOUNTER — INITIAL PRENATAL (OUTPATIENT)
Dept: OBSTETRICS AND GYNECOLOGY | Facility: CLINIC | Age: 37
End: 2022-12-27
Attending: OBSTETRICS & GYNECOLOGY
Payer: COMMERCIAL

## 2022-12-27 VITALS — DIASTOLIC BLOOD PRESSURE: 72 MMHG | WEIGHT: 134.5 LBS | SYSTOLIC BLOOD PRESSURE: 98 MMHG | BODY MASS INDEX: 21.06 KG/M2

## 2022-12-27 DIAGNOSIS — Z3A.13 13 WEEKS GESTATION OF PREGNANCY: ICD-10-CM

## 2022-12-27 DIAGNOSIS — O09.511 ELDERLY PRIMIGRAVIDA IN FIRST TRIMESTER: Primary | ICD-10-CM

## 2022-12-27 DIAGNOSIS — Z36.89 ENCOUNTER FOR FETAL ANATOMIC SURVEY: ICD-10-CM

## 2022-12-27 PROCEDURE — 99999 PR PBB SHADOW E&M-EST. PATIENT-LVL III: CPT | Mod: PBBFAC,,, | Performed by: OBSTETRICS & GYNECOLOGY

## 2022-12-27 PROCEDURE — 87086 URINE CULTURE/COLONY COUNT: CPT | Performed by: OBSTETRICS & GYNECOLOGY

## 2022-12-27 PROCEDURE — 87491 CHLMYD TRACH DNA AMP PROBE: CPT | Performed by: OBSTETRICS & GYNECOLOGY

## 2022-12-27 PROCEDURE — 0502F SUBSEQUENT PRENATAL CARE: CPT | Mod: CPTII,S$GLB,, | Performed by: OBSTETRICS & GYNECOLOGY

## 2022-12-27 PROCEDURE — 87591 N.GONORRHOEAE DNA AMP PROB: CPT | Performed by: OBSTETRICS & GYNECOLOGY

## 2022-12-27 PROCEDURE — 99999 PR PBB SHADOW E&M-EST. PATIENT-LVL III: ICD-10-PCS | Mod: PBBFAC,,, | Performed by: OBSTETRICS & GYNECOLOGY

## 2022-12-27 PROCEDURE — 0502F PR SUBSEQUENT PRENATAL CARE: ICD-10-PCS | Mod: CPTII,S$GLB,, | Performed by: OBSTETRICS & GYNECOLOGY

## 2022-12-29 LAB — BACTERIA UR CULT: NORMAL

## 2023-01-02 ENCOUNTER — PATIENT MESSAGE (OUTPATIENT)
Dept: ADMINISTRATIVE | Facility: OTHER | Age: 38
End: 2023-01-02
Payer: COMMERCIAL

## 2023-01-03 ENCOUNTER — TELEPHONE (OUTPATIENT)
Dept: OBSTETRICS AND GYNECOLOGY | Facility: CLINIC | Age: 38
End: 2023-01-03

## 2023-01-03 NOTE — TELEPHONE ENCOUNTER
12 and 1 OB pt had intercourse on Saturday night.  A few hours after she started having spotting.  Denies straining with BM or any pain.  Pt only has spotting when wiping.  Pt is A neg blood type.    I gave her reassurance about how spotting can be common in pregnancy and as long as it's not a period-like bleeding, she can monitor.  But does she need to come in to get a rhogam injection?  She says she won't be able to get it until tomorrow morning.    Please advise, thanks

## 2023-01-03 NOTE — TELEPHONE ENCOUNTER
Spoke with Dr. Kendrick.  Advised pt that since she's just spotting upon wiping, rhogam is not necessary at this time.  Advised per Dr. Kendrick to come in before next appt to get swabbed for any potential infections.    Scheduled with Dr. Osuna on Thursday.

## 2023-01-05 ENCOUNTER — ROUTINE PRENATAL (OUTPATIENT)
Dept: OBSTETRICS AND GYNECOLOGY | Facility: CLINIC | Age: 38
End: 2023-01-05
Attending: OBSTETRICS & GYNECOLOGY
Payer: COMMERCIAL

## 2023-01-05 VITALS — BODY MASS INDEX: 21.2 KG/M2 | WEIGHT: 135.38 LBS | DIASTOLIC BLOOD PRESSURE: 70 MMHG | SYSTOLIC BLOOD PRESSURE: 120 MMHG

## 2023-01-05 DIAGNOSIS — O26.859 SPOTTING IN EARLY PREGNANCY: Primary | ICD-10-CM

## 2023-01-05 PROCEDURE — 0502F SUBSEQUENT PRENATAL CARE: CPT | Mod: CPTII,S$GLB,, | Performed by: OBSTETRICS & GYNECOLOGY

## 2023-01-05 PROCEDURE — 0502F PR SUBSEQUENT PRENATAL CARE: ICD-10-PCS | Mod: CPTII,S$GLB,, | Performed by: OBSTETRICS & GYNECOLOGY

## 2023-01-05 PROCEDURE — 99999 PR PBB SHADOW E&M-EST. PATIENT-LVL III: CPT | Mod: PBBFAC,,, | Performed by: OBSTETRICS & GYNECOLOGY

## 2023-01-05 PROCEDURE — 99999 PR PBB SHADOW E&M-EST. PATIENT-LVL III: ICD-10-PCS | Mod: PBBFAC,,, | Performed by: OBSTETRICS & GYNECOLOGY

## 2023-01-05 NOTE — PROGRESS NOTES
Had spotting after intercourse that lasted a few days; nothing now. Feels fine. SSE: os closed, cervix long. Reassured. Reviewed spotting/cramping precautions.

## 2023-01-14 ENCOUNTER — PATIENT MESSAGE (OUTPATIENT)
Dept: OTHER | Facility: OTHER | Age: 38
End: 2023-01-14
Payer: COMMERCIAL

## 2023-01-21 ENCOUNTER — PATIENT MESSAGE (OUTPATIENT)
Dept: OTHER | Facility: OTHER | Age: 38
End: 2023-01-21
Payer: COMMERCIAL

## 2023-01-26 ENCOUNTER — ROUTINE PRENATAL (OUTPATIENT)
Dept: OBSTETRICS AND GYNECOLOGY | Facility: CLINIC | Age: 38
End: 2023-01-26
Attending: OBSTETRICS & GYNECOLOGY
Payer: COMMERCIAL

## 2023-01-26 VITALS
WEIGHT: 137.69 LBS | DIASTOLIC BLOOD PRESSURE: 60 MMHG | SYSTOLIC BLOOD PRESSURE: 100 MMHG | BODY MASS INDEX: 21.56 KG/M2

## 2023-01-26 DIAGNOSIS — Z3A.17 17 WEEKS GESTATION OF PREGNANCY: ICD-10-CM

## 2023-01-26 DIAGNOSIS — O09.512 AMA (ADVANCED MATERNAL AGE) PRIMIGRAVIDA 35+, SECOND TRIMESTER: Primary | ICD-10-CM

## 2023-01-26 PROCEDURE — 0502F SUBSEQUENT PRENATAL CARE: CPT | Mod: CPTII,S$GLB,, | Performed by: OBSTETRICS & GYNECOLOGY

## 2023-01-26 PROCEDURE — 99999 PR PBB SHADOW E&M-EST. PATIENT-LVL III: ICD-10-PCS | Mod: PBBFAC,,, | Performed by: OBSTETRICS & GYNECOLOGY

## 2023-01-26 PROCEDURE — 99999 PR PBB SHADOW E&M-EST. PATIENT-LVL III: CPT | Mod: PBBFAC,,, | Performed by: OBSTETRICS & GYNECOLOGY

## 2023-01-26 PROCEDURE — 0502F PR SUBSEQUENT PRENATAL CARE: ICD-10-PCS | Mod: CPTII,S$GLB,, | Performed by: OBSTETRICS & GYNECOLOGY

## 2023-01-26 NOTE — PROGRESS NOTES
No complaints.  Anatomy u/s scheduled. Will call with any issues.   DATE OF SERVICE: 01-25-22 @ 07:11    Subjective: Patient seen and examined. No new events except as noted.     SUBJECTIVE/ROS:      MEDICATIONS:  MEDICATIONS  (STANDING):  acetaminophen     Tablet .. 975 milliGRAM(s) Oral every 6 hours  benzocaine 15 mG/menthol 3.6 mG Lozenge 1 Lozenge Oral every 6 hours  heparin   Injectable 5000 Unit(s) SubCutaneous every 8 hours  pantoprazole    Tablet 40 milliGRAM(s) Oral before breakfast      PHYSICAL EXAM:  T(C): 36.8 (01-25-22 @ 07:05), Max: 37.3 (01-24-22 @ 19:16)  HR: 113 (01-25-22 @ 07:05) (105 - 114)  BP: 134/78 (01-25-22 @ 07:05) (115/74 - 134/78)  RR: 18 (01-25-22 @ 07:05) (17 - 19)  SpO2: 98% (01-25-22 @ 07:05) (97% - 100%)  Wt(kg): --  I&O's Summary          JVP: Normal  Neck: supple  Lung: clear   CV: S1 S2 , Murmur:  Abd: soft  Ext: No edema  neuro: Awake / alert  Psych: flat affect  Skin: normal``    LABS/DATA:    CARDIAC MARKERS:                                8.3    10.31 )-----------( 349      ( 24 Jan 2022 07:34 )             26.5     01-24    134<L>  |  100  |  15  ----------------------------<  111<H>  4.2   |  21<L>  |  0.69    Ca    9.0      24 Jan 2022 07:34  Phos  3.4     01-24  Mg     2.00     01-24    TPro  8.3  /  Alb  3.6  /  TBili  0.5  /  DBili  <0.2  /  AST  25  /  ALT  19  /  AlkPhos  197<H>  01-23    proBNP:   Lipid Profile:   HgA1c:   TSH:     TELE:  EKG:

## 2023-02-06 ENCOUNTER — PATIENT MESSAGE (OUTPATIENT)
Dept: MATERNAL FETAL MEDICINE | Facility: CLINIC | Age: 38
End: 2023-02-06
Payer: COMMERCIAL

## 2023-02-07 ENCOUNTER — TELEPHONE (OUTPATIENT)
Dept: OBSTETRICS AND GYNECOLOGY | Facility: CLINIC | Age: 38
End: 2023-02-07
Payer: COMMERCIAL

## 2023-02-07 ENCOUNTER — PROCEDURE VISIT (OUTPATIENT)
Dept: MATERNAL FETAL MEDICINE | Facility: CLINIC | Age: 38
End: 2023-02-07
Attending: OBSTETRICS & GYNECOLOGY
Payer: COMMERCIAL

## 2023-02-07 DIAGNOSIS — Z36.2 ENCOUNTER FOR FOLLOW-UP ULTRASOUND OF FETAL ANATOMY: Primary | ICD-10-CM

## 2023-02-07 DIAGNOSIS — Z36.89 ENCOUNTER FOR FETAL ANATOMIC SURVEY: ICD-10-CM

## 2023-02-07 PROCEDURE — 76811 OB US DETAILED SNGL FETUS: CPT | Mod: S$GLB,,, | Performed by: OBSTETRICS & GYNECOLOGY

## 2023-02-07 PROCEDURE — 76811 US MFM PROCEDURE (VIEWPOINT): ICD-10-PCS | Mod: S$GLB,,, | Performed by: OBSTETRICS & GYNECOLOGY

## 2023-02-07 NOTE — TELEPHONE ENCOUNTER
Spoke with lab. They called in regards to GCCT on 12/27/2022. Order was cancelled due to specimen being lost between lab transfers. Will repeat order at next appt.    Initial (On Arrival)

## 2023-02-07 NOTE — TELEPHONE ENCOUNTER
----- Message from Elmer Multani sent at 2/7/2023  4:11 PM CST -----  Regarding: Lab Client Services  Good Evening,     My name is Lisbethne Rangel I work in the Lab Client Services. We had a problem with some lab work on this patient. If someone from your office could call us at [phone] or ext. [phone] that would be great. Anyone in my department can help.      Thank you

## 2023-02-11 ENCOUNTER — PATIENT MESSAGE (OUTPATIENT)
Dept: OTHER | Facility: OTHER | Age: 38
End: 2023-02-11
Payer: COMMERCIAL

## 2023-02-23 ENCOUNTER — ROUTINE PRENATAL (OUTPATIENT)
Dept: OBSTETRICS AND GYNECOLOGY | Facility: CLINIC | Age: 38
End: 2023-02-23
Attending: OBSTETRICS & GYNECOLOGY
Payer: COMMERCIAL

## 2023-02-23 VITALS
DIASTOLIC BLOOD PRESSURE: 60 MMHG | WEIGHT: 143.31 LBS | BODY MASS INDEX: 22.44 KG/M2 | SYSTOLIC BLOOD PRESSURE: 102 MMHG

## 2023-02-23 DIAGNOSIS — O09.512 AMA (ADVANCED MATERNAL AGE) PRIMIGRAVIDA 35+, SECOND TRIMESTER: Primary | ICD-10-CM

## 2023-02-23 DIAGNOSIS — Z3A.21 21 WEEKS GESTATION OF PREGNANCY: ICD-10-CM

## 2023-02-23 PROCEDURE — 99999 PR PBB SHADOW E&M-EST. PATIENT-LVL III: ICD-10-PCS | Mod: PBBFAC,,, | Performed by: OBSTETRICS & GYNECOLOGY

## 2023-02-23 PROCEDURE — 0502F SUBSEQUENT PRENATAL CARE: CPT | Mod: CPTII,S$GLB,, | Performed by: OBSTETRICS & GYNECOLOGY

## 2023-02-23 PROCEDURE — 0502F PR SUBSEQUENT PRENATAL CARE: ICD-10-PCS | Mod: CPTII,S$GLB,, | Performed by: OBSTETRICS & GYNECOLOGY

## 2023-02-23 PROCEDURE — 99999 PR PBB SHADOW E&M-EST. PATIENT-LVL III: CPT | Mod: PBBFAC,,, | Performed by: OBSTETRICS & GYNECOLOGY

## 2023-02-23 NOTE — PROGRESS NOTES
Having issues with constipation. Has tried Miralax for last two days. Add MOM or mag citrate. Continue to drink enough water. Some left sciatic nerve pain: stretching. Repeat anatomy u/s. 7 month labs next visit.

## 2023-03-06 ENCOUNTER — PATIENT MESSAGE (OUTPATIENT)
Dept: MATERNAL FETAL MEDICINE | Facility: CLINIC | Age: 38
End: 2023-03-06
Payer: COMMERCIAL

## 2023-03-07 ENCOUNTER — PROCEDURE VISIT (OUTPATIENT)
Dept: MATERNAL FETAL MEDICINE | Facility: CLINIC | Age: 38
End: 2023-03-07
Attending: OBSTETRICS & GYNECOLOGY
Payer: COMMERCIAL

## 2023-03-07 DIAGNOSIS — Z36.2 ENCOUNTER FOR FOLLOW-UP ULTRASOUND OF FETAL ANATOMY: ICD-10-CM

## 2023-03-08 ENCOUNTER — TELEPHONE (OUTPATIENT)
Dept: OBSTETRICS AND GYNECOLOGY | Facility: CLINIC | Age: 38
End: 2023-03-08
Payer: COMMERCIAL

## 2023-03-08 DIAGNOSIS — O09.523 AMA (ADVANCED MATERNAL AGE) MULTIGRAVIDA 35+, THIRD TRIMESTER: Primary | ICD-10-CM

## 2023-03-08 NOTE — TELEPHONE ENCOUNTER
----- Message from Griffin Sagastume MA sent at 3/7/2023  4:51 PM CST -----  This patient will need a 32 week Growth ultrasound scan for AMA scheduled at your office. Patient would prefer a Tuesday in between 10:00-10:30 a.m.    Thank you,  Griffin MATUTE

## 2023-03-10 ENCOUNTER — PATIENT MESSAGE (OUTPATIENT)
Dept: OBSTETRICS AND GYNECOLOGY | Facility: CLINIC | Age: 38
End: 2023-03-10
Payer: COMMERCIAL

## 2023-03-11 ENCOUNTER — PATIENT MESSAGE (OUTPATIENT)
Dept: OTHER | Facility: OTHER | Age: 38
End: 2023-03-11
Payer: COMMERCIAL

## 2023-03-13 NOTE — TELEPHONE ENCOUNTER
Please let patient know I'm checking with MFM to see if this is an acceptable alternative. I'll be back in touch when I hear.

## 2023-03-14 ENCOUNTER — TELEPHONE (OUTPATIENT)
Dept: OBSTETRICS AND GYNECOLOGY | Facility: CLINIC | Age: 38
End: 2023-03-14
Payer: COMMERCIAL

## 2023-03-14 DIAGNOSIS — O09.512 AMA (ADVANCED MATERNAL AGE) PRIMIGRAVIDA 35+, SECOND TRIMESTER: Primary | ICD-10-CM

## 2023-03-14 NOTE — TELEPHONE ENCOUNTER
Spoke with patient. Would prefer if she use the traditional glucola as I'm not familiar with the Fresh test.     Discussed procedure for 1 hr GTT and answered questions.

## 2023-03-15 NOTE — TELEPHONE ENCOUNTER
24 4/7 week OB reports bloody stools.  C/o intense cramping, nausea, chills and felt clammy while on the toilet.  H/o constipation.  Feeling better after hydrating and resting.  Denies vaginal bleeding, add pain, and ctx.  Positive fetal movement.  Advised she may have a fissure or internal hemorrhoid that was irritated during BM.  Recommended she start on a daily stool softener and hydrate.

## 2023-03-22 ENCOUNTER — TELEPHONE (OUTPATIENT)
Dept: OBSTETRICS AND GYNECOLOGY | Facility: CLINIC | Age: 38
End: 2023-03-22
Payer: COMMERCIAL

## 2023-03-22 NOTE — TELEPHONE ENCOUNTER
OB pt states she is supposed to do glucose test tomorrow.  She wants another/ option.  She does not want to drink the drink that is offered.  She would like to postpone the glucose test tomorrow and discuss it with Dr. Osuna.     Cancelled lab. Instructed her to keep appt to discuss with MD.   
Yes

## 2023-03-23 ENCOUNTER — ROUTINE PRENATAL (OUTPATIENT)
Dept: OBSTETRICS AND GYNECOLOGY | Facility: CLINIC | Age: 38
End: 2023-03-23
Attending: OBSTETRICS & GYNECOLOGY
Payer: COMMERCIAL

## 2023-03-23 VITALS
WEIGHT: 151.88 LBS | SYSTOLIC BLOOD PRESSURE: 112 MMHG | BODY MASS INDEX: 23.79 KG/M2 | DIASTOLIC BLOOD PRESSURE: 64 MMHG

## 2023-03-23 DIAGNOSIS — Z3A.25 25 WEEKS GESTATION OF PREGNANCY: ICD-10-CM

## 2023-03-23 DIAGNOSIS — O09.512 AMA (ADVANCED MATERNAL AGE) PRIMIGRAVIDA 35+, SECOND TRIMESTER: Primary | ICD-10-CM

## 2023-03-23 PROCEDURE — 99999 PR PBB SHADOW E&M-EST. PATIENT-LVL II: ICD-10-PCS | Mod: PBBFAC,,, | Performed by: OBSTETRICS & GYNECOLOGY

## 2023-03-23 PROCEDURE — 0502F SUBSEQUENT PRENATAL CARE: CPT | Mod: CPTII,S$GLB,, | Performed by: OBSTETRICS & GYNECOLOGY

## 2023-03-23 PROCEDURE — 0502F PR SUBSEQUENT PRENATAL CARE: ICD-10-PCS | Mod: CPTII,S$GLB,, | Performed by: OBSTETRICS & GYNECOLOGY

## 2023-03-23 PROCEDURE — 99999 PR PBB SHADOW E&M-EST. PATIENT-LVL II: CPT | Mod: PBBFAC,,, | Performed by: OBSTETRICS & GYNECOLOGY

## 2023-03-23 NOTE — PROGRESS NOTES
"Having some superficial RUQ skin "nerve" discomfort: reassured. 7 month labs and Rhogam next visit. Tdap the following visit. Patient feels strongly about bringing her own 50 gram glucose drink to the lab; comment added to lab appointment that it's ok with me.    "

## 2023-03-25 ENCOUNTER — PATIENT MESSAGE (OUTPATIENT)
Dept: OTHER | Facility: OTHER | Age: 38
End: 2023-03-25
Payer: COMMERCIAL

## 2023-04-06 ENCOUNTER — LAB VISIT (OUTPATIENT)
Dept: LAB | Facility: HOSPITAL | Age: 38
End: 2023-04-06
Attending: OBSTETRICS & GYNECOLOGY
Payer: COMMERCIAL

## 2023-04-06 ENCOUNTER — CLINICAL SUPPORT (OUTPATIENT)
Dept: OBSTETRICS AND GYNECOLOGY | Facility: CLINIC | Age: 38
End: 2023-04-06
Attending: OBSTETRICS & GYNECOLOGY
Payer: COMMERCIAL

## 2023-04-06 DIAGNOSIS — Z67.91 RH NEGATIVE STATUS IN SINGLETON PREGNANCY IN THIRD TRIMESTER: Primary | ICD-10-CM

## 2023-04-06 DIAGNOSIS — O09.512 AMA (ADVANCED MATERNAL AGE) PRIMIGRAVIDA 35+, SECOND TRIMESTER: ICD-10-CM

## 2023-04-06 DIAGNOSIS — O26.893 RH NEGATIVE STATUS IN SINGLETON PREGNANCY IN THIRD TRIMESTER: Primary | ICD-10-CM

## 2023-04-06 LAB
BASOPHILS # BLD AUTO: 0.04 K/UL (ref 0–0.2)
BASOPHILS NFR BLD: 0.5 % (ref 0–1.9)
BLD GP AB SCN CELLS X3 SERPL QL: NORMAL
DIFFERENTIAL METHOD: ABNORMAL
EOSINOPHIL # BLD AUTO: 0 K/UL (ref 0–0.5)
EOSINOPHIL NFR BLD: 0.4 % (ref 0–8)
ERYTHROCYTE [DISTWIDTH] IN BLOOD BY AUTOMATED COUNT: 14 % (ref 11.5–14.5)
GLUCOSE SERPL-MCNC: 103 MG/DL (ref 70–140)
HCT VFR BLD AUTO: 31.2 % (ref 37–48.5)
HGB BLD-MCNC: 9.5 G/DL (ref 12–16)
IMM GRANULOCYTES # BLD AUTO: 0.06 K/UL (ref 0–0.04)
IMM GRANULOCYTES NFR BLD AUTO: 0.8 % (ref 0–0.5)
LYMPHOCYTES # BLD AUTO: 1.2 K/UL (ref 1–4.8)
LYMPHOCYTES NFR BLD: 16.9 % (ref 18–48)
MCH RBC QN AUTO: 31.5 PG (ref 27–31)
MCHC RBC AUTO-ENTMCNC: 30.4 G/DL (ref 32–36)
MCV RBC AUTO: 103 FL (ref 82–98)
MONOCYTES # BLD AUTO: 0.7 K/UL (ref 0.3–1)
MONOCYTES NFR BLD: 8.9 % (ref 4–15)
NEUTROPHILS # BLD AUTO: 5.3 K/UL (ref 1.8–7.7)
NEUTROPHILS NFR BLD: 72.5 % (ref 38–73)
NRBC BLD-RTO: 0 /100 WBC
PLATELET # BLD AUTO: 213 K/UL (ref 150–450)
PMV BLD AUTO: 11.4 FL (ref 9.2–12.9)
RBC # BLD AUTO: 3.02 M/UL (ref 4–5.4)
WBC # BLD AUTO: 7.29 K/UL (ref 3.9–12.7)

## 2023-04-06 PROCEDURE — 99999 PR PBB SHADOW E&M-EST. PATIENT-LVL II: CPT | Mod: PBBFAC,,,

## 2023-04-06 PROCEDURE — 82950 GLUCOSE TEST: CPT | Performed by: OBSTETRICS & GYNECOLOGY

## 2023-04-06 PROCEDURE — 96372 THER/PROPH/DIAG INJ SC/IM: CPT | Mod: S$GLB,,, | Performed by: OBSTETRICS & GYNECOLOGY

## 2023-04-06 PROCEDURE — 96372 RHO (D) IMMUNE GLOBULIN: ICD-10-PCS | Mod: S$GLB,,, | Performed by: OBSTETRICS & GYNECOLOGY

## 2023-04-06 PROCEDURE — 86850 RBC ANTIBODY SCREEN: CPT | Performed by: OBSTETRICS & GYNECOLOGY

## 2023-04-06 PROCEDURE — 36415 COLL VENOUS BLD VENIPUNCTURE: CPT | Performed by: OBSTETRICS & GYNECOLOGY

## 2023-04-06 PROCEDURE — 85025 COMPLETE CBC W/AUTO DIFF WBC: CPT | Performed by: OBSTETRICS & GYNECOLOGY

## 2023-04-06 PROCEDURE — 99999 PR PBB SHADOW E&M-EST. PATIENT-LVL II: ICD-10-PCS | Mod: PBBFAC,,,

## 2023-04-06 NOTE — PROGRESS NOTES
.4/6/23 Pt administered Rhogam 300 mcg IM to the  Left upper outer glut. Pt tolerated well and provided proof of injection card.     Ordering Provider:Dr Osuna    Pain Before: none    Pain After: none    Patient Complaints: none

## 2023-04-08 ENCOUNTER — PATIENT MESSAGE (OUTPATIENT)
Dept: OTHER | Facility: OTHER | Age: 38
End: 2023-04-08
Payer: COMMERCIAL

## 2023-04-20 ENCOUNTER — ROUTINE PRENATAL (OUTPATIENT)
Dept: OBSTETRICS AND GYNECOLOGY | Facility: CLINIC | Age: 38
End: 2023-04-20
Attending: OBSTETRICS & GYNECOLOGY
Payer: COMMERCIAL

## 2023-04-20 VITALS — WEIGHT: 155.44 LBS | BODY MASS INDEX: 24.34 KG/M2 | SYSTOLIC BLOOD PRESSURE: 98 MMHG | DIASTOLIC BLOOD PRESSURE: 64 MMHG

## 2023-04-20 DIAGNOSIS — O09.513 AMA (ADVANCED MATERNAL AGE) PRIMIGRAVIDA 35+, THIRD TRIMESTER: Primary | ICD-10-CM

## 2023-04-20 DIAGNOSIS — Z3A.29 29 WEEKS GESTATION OF PREGNANCY: ICD-10-CM

## 2023-04-20 PROCEDURE — 99999 PR PBB SHADOW E&M-EST. PATIENT-LVL III: CPT | Mod: PBBFAC,,, | Performed by: OBSTETRICS & GYNECOLOGY

## 2023-04-20 PROCEDURE — 0502F SUBSEQUENT PRENATAL CARE: CPT | Mod: CPTII,S$GLB,, | Performed by: OBSTETRICS & GYNECOLOGY

## 2023-04-20 PROCEDURE — 0502F PR SUBSEQUENT PRENATAL CARE: ICD-10-PCS | Mod: CPTII,S$GLB,, | Performed by: OBSTETRICS & GYNECOLOGY

## 2023-04-20 PROCEDURE — 99999 PR PBB SHADOW E&M-EST. PATIENT-LVL III: ICD-10-PCS | Mod: PBBFAC,,, | Performed by: OBSTETRICS & GYNECOLOGY

## 2023-04-20 NOTE — PROGRESS NOTES
No complaints. Reviewed FMC. Taking PNV and iron daily. Declines Tdap. U/S for growth planned at 32 weeks.

## 2023-04-22 ENCOUNTER — PATIENT MESSAGE (OUTPATIENT)
Dept: OTHER | Facility: OTHER | Age: 38
End: 2023-04-22
Payer: COMMERCIAL

## 2023-05-02 ENCOUNTER — ROUTINE PRENATAL (OUTPATIENT)
Dept: OBSTETRICS AND GYNECOLOGY | Facility: CLINIC | Age: 38
End: 2023-05-02
Payer: COMMERCIAL

## 2023-05-02 VITALS
DIASTOLIC BLOOD PRESSURE: 64 MMHG | BODY MASS INDEX: 24.72 KG/M2 | WEIGHT: 157.88 LBS | SYSTOLIC BLOOD PRESSURE: 106 MMHG

## 2023-05-02 DIAGNOSIS — Z3A.31 31 WEEKS GESTATION OF PREGNANCY: Primary | ICD-10-CM

## 2023-05-02 PROCEDURE — 0502F PR SUBSEQUENT PRENATAL CARE: ICD-10-PCS | Mod: CPTII,S$GLB,,

## 2023-05-02 PROCEDURE — 99999 PR PBB SHADOW E&M-EST. PATIENT-LVL II: ICD-10-PCS | Mod: PBBFAC,,,

## 2023-05-02 PROCEDURE — 99999 PR PBB SHADOW E&M-EST. PATIENT-LVL II: CPT | Mod: PBBFAC,,,

## 2023-05-02 PROCEDURE — 0502F SUBSEQUENT PRENATAL CARE: CPT | Mod: CPTII,S$GLB,,

## 2023-05-02 NOTE — PROGRESS NOTES
Here for routine OB appt at 31w3d, with no complaints.  Reports good FM.  Denies LOF, denies VB, denies contractions. Reviewed warning signs of Labor and Preeclampsia.  Daily FM counts reinforced. 32 wk growth scan next week.  RTC in 2 weeks for THERESE.

## 2023-05-06 ENCOUNTER — PATIENT MESSAGE (OUTPATIENT)
Dept: OTHER | Facility: OTHER | Age: 38
End: 2023-05-06
Payer: COMMERCIAL

## 2023-05-09 ENCOUNTER — PROCEDURE VISIT (OUTPATIENT)
Dept: OBSTETRICS AND GYNECOLOGY | Facility: CLINIC | Age: 38
End: 2023-05-09
Attending: OBSTETRICS & GYNECOLOGY
Payer: COMMERCIAL

## 2023-05-09 DIAGNOSIS — O09.523 AMA (ADVANCED MATERNAL AGE) MULTIGRAVIDA 35+, THIRD TRIMESTER: ICD-10-CM

## 2023-05-09 PROCEDURE — 76816 OB US FOLLOW-UP PER FETUS: CPT | Mod: S$GLB,,, | Performed by: OBSTETRICS & GYNECOLOGY

## 2023-05-09 PROCEDURE — 76816 US OB/GYN EXTENDED PROCEDURE (VIEWPOINT): ICD-10-PCS | Mod: S$GLB,,, | Performed by: OBSTETRICS & GYNECOLOGY

## 2023-05-18 ENCOUNTER — ROUTINE PRENATAL (OUTPATIENT)
Dept: OBSTETRICS AND GYNECOLOGY | Facility: CLINIC | Age: 38
End: 2023-05-18
Payer: COMMERCIAL

## 2023-05-18 VITALS — WEIGHT: 159.63 LBS | BODY MASS INDEX: 25 KG/M2 | SYSTOLIC BLOOD PRESSURE: 116 MMHG | DIASTOLIC BLOOD PRESSURE: 60 MMHG

## 2023-05-18 DIAGNOSIS — Z3A.33 33 WEEKS GESTATION OF PREGNANCY: ICD-10-CM

## 2023-05-18 DIAGNOSIS — O09.523 AMA (ADVANCED MATERNAL AGE) MULTIGRAVIDA 35+, THIRD TRIMESTER: Primary | ICD-10-CM

## 2023-05-18 PROCEDURE — 99999 PR PBB SHADOW E&M-EST. PATIENT-LVL III: ICD-10-PCS | Mod: PBBFAC,,, | Performed by: OBSTETRICS & GYNECOLOGY

## 2023-05-18 PROCEDURE — 0502F PR SUBSEQUENT PRENATAL CARE: ICD-10-PCS | Mod: CPTII,S$GLB,, | Performed by: OBSTETRICS & GYNECOLOGY

## 2023-05-18 PROCEDURE — 99999 PR PBB SHADOW E&M-EST. PATIENT-LVL III: CPT | Mod: PBBFAC,,, | Performed by: OBSTETRICS & GYNECOLOGY

## 2023-05-18 PROCEDURE — 0502F SUBSEQUENT PRENATAL CARE: CPT | Mod: CPTII,S$GLB,, | Performed by: OBSTETRICS & GYNECOLOGY

## 2023-05-27 ENCOUNTER — PATIENT MESSAGE (OUTPATIENT)
Dept: OTHER | Facility: OTHER | Age: 38
End: 2023-05-27
Payer: COMMERCIAL

## 2023-06-01 ENCOUNTER — ROUTINE PRENATAL (OUTPATIENT)
Dept: OBSTETRICS AND GYNECOLOGY | Facility: CLINIC | Age: 38
End: 2023-06-01
Payer: COMMERCIAL

## 2023-06-01 VITALS
WEIGHT: 162.94 LBS | DIASTOLIC BLOOD PRESSURE: 64 MMHG | BODY MASS INDEX: 25.52 KG/M2 | SYSTOLIC BLOOD PRESSURE: 100 MMHG

## 2023-06-01 DIAGNOSIS — Z3A.35 35 WEEKS GESTATION OF PREGNANCY: Primary | ICD-10-CM

## 2023-06-01 PROCEDURE — 0502F PR SUBSEQUENT PRENATAL CARE: ICD-10-PCS | Mod: CPTII,S$GLB,,

## 2023-06-01 PROCEDURE — 0502F SUBSEQUENT PRENATAL CARE: CPT | Mod: CPTII,S$GLB,,

## 2023-06-01 PROCEDURE — 99999 PR PBB SHADOW E&M-EST. PATIENT-LVL II: ICD-10-PCS | Mod: PBBFAC,,,

## 2023-06-01 PROCEDURE — 99999 PR PBB SHADOW E&M-EST. PATIENT-LVL II: CPT | Mod: PBBFAC,,,

## 2023-06-01 NOTE — PROGRESS NOTES
Here for routine OB appt at 35w5d, with no complaints.  Doing well overall. Presents with .  Reports good FM.  Daily FM counts reinforced.  -LOF/VB/some irregular/tolerable lulu mejia CTX.   IOL discussed and patient is not interested at this time.  Reviewed warning signs of Labor and Preeclampsia.   RTC in 1 weeks.    Discussed Next visit:  GBS, 3TMS labs

## 2023-06-08 ENCOUNTER — ROUTINE PRENATAL (OUTPATIENT)
Dept: OBSTETRICS AND GYNECOLOGY | Facility: CLINIC | Age: 38
End: 2023-06-08
Payer: COMMERCIAL

## 2023-06-08 ENCOUNTER — LAB VISIT (OUTPATIENT)
Dept: LAB | Facility: HOSPITAL | Age: 38
End: 2023-06-08
Payer: COMMERCIAL

## 2023-06-08 VITALS
WEIGHT: 164.44 LBS | DIASTOLIC BLOOD PRESSURE: 76 MMHG | BODY MASS INDEX: 25.76 KG/M2 | SYSTOLIC BLOOD PRESSURE: 110 MMHG

## 2023-06-08 DIAGNOSIS — Z3A.36 36 WEEKS GESTATION OF PREGNANCY: Primary | ICD-10-CM

## 2023-06-08 DIAGNOSIS — Z3A.36 36 WEEKS GESTATION OF PREGNANCY: ICD-10-CM

## 2023-06-08 LAB
BASOPHILS # BLD AUTO: 0.03 K/UL (ref 0–0.2)
BASOPHILS NFR BLD: 0.4 % (ref 0–1.9)
DIFFERENTIAL METHOD: ABNORMAL
EOSINOPHIL # BLD AUTO: 0.1 K/UL (ref 0–0.5)
EOSINOPHIL NFR BLD: 0.9 % (ref 0–8)
ERYTHROCYTE [DISTWIDTH] IN BLOOD BY AUTOMATED COUNT: 15.1 % (ref 11.5–14.5)
HCT VFR BLD AUTO: 32.9 % (ref 37–48.5)
HGB BLD-MCNC: 10.1 G/DL (ref 12–16)
HIV 1+2 AB+HIV1 P24 AG SERPL QL IA: NORMAL
IMM GRANULOCYTES # BLD AUTO: 0.12 K/UL (ref 0–0.04)
IMM GRANULOCYTES NFR BLD AUTO: 1.6 % (ref 0–0.5)
LYMPHOCYTES # BLD AUTO: 1.3 K/UL (ref 1–4.8)
LYMPHOCYTES NFR BLD: 18.2 % (ref 18–48)
MCH RBC QN AUTO: 30.8 PG (ref 27–31)
MCHC RBC AUTO-ENTMCNC: 30.7 G/DL (ref 32–36)
MCV RBC AUTO: 100 FL (ref 82–98)
MONOCYTES # BLD AUTO: 0.6 K/UL (ref 0.3–1)
MONOCYTES NFR BLD: 8 % (ref 4–15)
NEUTROPHILS # BLD AUTO: 5.2 K/UL (ref 1.8–7.7)
NEUTROPHILS NFR BLD: 70.9 % (ref 38–73)
NRBC BLD-RTO: 0 /100 WBC
PLATELET # BLD AUTO: 166 K/UL (ref 150–450)
PMV BLD AUTO: 11.5 FL (ref 9.2–12.9)
RBC # BLD AUTO: 3.28 M/UL (ref 4–5.4)
WBC # BLD AUTO: 7.38 K/UL (ref 3.9–12.7)

## 2023-06-08 PROCEDURE — 86592 SYPHILIS TEST NON-TREP QUAL: CPT

## 2023-06-08 PROCEDURE — 87147 CULTURE TYPE IMMUNOLOGIC: CPT

## 2023-06-08 PROCEDURE — 0502F PR SUBSEQUENT PRENATAL CARE: ICD-10-PCS | Mod: CPTII,S$GLB,,

## 2023-06-08 PROCEDURE — 99999 PR PBB SHADOW E&M-EST. PATIENT-LVL III: ICD-10-PCS | Mod: PBBFAC,,,

## 2023-06-08 PROCEDURE — 85025 COMPLETE CBC W/AUTO DIFF WBC: CPT

## 2023-06-08 PROCEDURE — 0502F SUBSEQUENT PRENATAL CARE: CPT | Mod: CPTII,S$GLB,,

## 2023-06-08 PROCEDURE — 87081 CULTURE SCREEN ONLY: CPT

## 2023-06-08 PROCEDURE — 87389 HIV-1 AG W/HIV-1&-2 AB AG IA: CPT

## 2023-06-08 PROCEDURE — 99999 PR PBB SHADOW E&M-EST. PATIENT-LVL III: CPT | Mod: PBBFAC,,,

## 2023-06-08 NOTE — PROGRESS NOTES
Here for routine OB appt at 36w5d, with no complaints. Presents with .    Reports good FM.  -LOF/VB/some irregular/tolerable lulu mejia CTX.   Reviewed warning signs of Labor and Preeclampsia.    Declined cervical check today.  GBS collected.  CBC/HIV/RPR labs today.  F/U scheduled 1 week.

## 2023-06-09 LAB
BACTERIA SPEC AEROBE CULT: ABNORMAL
RPR SER QL: NORMAL

## 2023-06-14 ENCOUNTER — TELEPHONE (OUTPATIENT)
Dept: OBSTETRICS AND GYNECOLOGY | Facility: OTHER | Age: 38
End: 2023-06-14
Payer: COMMERCIAL

## 2023-06-15 ENCOUNTER — ROUTINE PRENATAL (OUTPATIENT)
Dept: OBSTETRICS AND GYNECOLOGY | Facility: CLINIC | Age: 38
End: 2023-06-15
Payer: COMMERCIAL

## 2023-06-15 VITALS — DIASTOLIC BLOOD PRESSURE: 68 MMHG | SYSTOLIC BLOOD PRESSURE: 96 MMHG | BODY MASS INDEX: 25.72 KG/M2 | WEIGHT: 164.25 LBS

## 2023-06-15 DIAGNOSIS — R31.9 HEMATURIA, UNSPECIFIED TYPE: ICD-10-CM

## 2023-06-15 DIAGNOSIS — O09.513 AMA (ADVANCED MATERNAL AGE) PRIMIGRAVIDA 35+, THIRD TRIMESTER: Primary | ICD-10-CM

## 2023-06-15 DIAGNOSIS — Z3A.37 37 WEEKS GESTATION OF PREGNANCY: ICD-10-CM

## 2023-06-15 PROCEDURE — 99999 PR PBB SHADOW E&M-EST. PATIENT-LVL III: ICD-10-PCS | Mod: PBBFAC,,, | Performed by: OBSTETRICS & GYNECOLOGY

## 2023-06-15 PROCEDURE — 99999 PR PBB SHADOW E&M-EST. PATIENT-LVL III: CPT | Mod: PBBFAC,,, | Performed by: OBSTETRICS & GYNECOLOGY

## 2023-06-15 PROCEDURE — 87086 URINE CULTURE/COLONY COUNT: CPT | Performed by: OBSTETRICS & GYNECOLOGY

## 2023-06-15 PROCEDURE — 0502F PR SUBSEQUENT PRENATAL CARE: ICD-10-PCS | Mod: CPTII,S$GLB,, | Performed by: OBSTETRICS & GYNECOLOGY

## 2023-06-15 PROCEDURE — 0502F SUBSEQUENT PRENATAL CARE: CPT | Mod: CPTII,S$GLB,, | Performed by: OBSTETRICS & GYNECOLOGY

## 2023-06-15 NOTE — PROGRESS NOTES
Some blood-tinged discharge and urine. Irregular contractions. Baby moves well. Reviewed FMC. Declines cervical check. GBBS pos. Gave labor/bleeding precautions. Will send urine for culture although blood tinge is likely due to cervical softening. If bleeds heavier that just when wipes, will go to OB ED.

## 2023-06-16 LAB
BACTERIA UR CULT: NORMAL
BACTERIA UR CULT: NORMAL

## 2023-06-22 ENCOUNTER — ROUTINE PRENATAL (OUTPATIENT)
Dept: OBSTETRICS AND GYNECOLOGY | Facility: CLINIC | Age: 38
End: 2023-06-22
Payer: COMMERCIAL

## 2023-06-22 VITALS
SYSTOLIC BLOOD PRESSURE: 120 MMHG | DIASTOLIC BLOOD PRESSURE: 68 MMHG | WEIGHT: 165.81 LBS | BODY MASS INDEX: 25.97 KG/M2

## 2023-06-22 DIAGNOSIS — Z3A.38 38 WEEKS GESTATION OF PREGNANCY: ICD-10-CM

## 2023-06-22 DIAGNOSIS — O09.513 AMA (ADVANCED MATERNAL AGE) PRIMIGRAVIDA 35+, THIRD TRIMESTER: Primary | ICD-10-CM

## 2023-06-22 PROCEDURE — 99999 PR PBB SHADOW E&M-EST. PATIENT-LVL III: ICD-10-PCS | Mod: PBBFAC,,, | Performed by: OBSTETRICS & GYNECOLOGY

## 2023-06-22 PROCEDURE — 99999 PR PBB SHADOW E&M-EST. PATIENT-LVL III: CPT | Mod: PBBFAC,,, | Performed by: OBSTETRICS & GYNECOLOGY

## 2023-06-22 PROCEDURE — 0502F PR SUBSEQUENT PRENATAL CARE: ICD-10-PCS | Mod: CPTII,S$GLB,, | Performed by: OBSTETRICS & GYNECOLOGY

## 2023-06-22 PROCEDURE — 0502F SUBSEQUENT PRENATAL CARE: CPT | Mod: CPTII,S$GLB,, | Performed by: OBSTETRICS & GYNECOLOGY

## 2023-06-29 ENCOUNTER — TELEPHONE (OUTPATIENT)
Dept: OBSTETRICS AND GYNECOLOGY | Facility: CLINIC | Age: 38
End: 2023-06-29
Payer: COMMERCIAL

## 2023-06-29 ENCOUNTER — ROUTINE PRENATAL (OUTPATIENT)
Dept: OBSTETRICS AND GYNECOLOGY | Facility: CLINIC | Age: 38
End: 2023-06-29
Payer: COMMERCIAL

## 2023-06-29 VITALS — SYSTOLIC BLOOD PRESSURE: 110 MMHG | WEIGHT: 166 LBS | DIASTOLIC BLOOD PRESSURE: 70 MMHG | BODY MASS INDEX: 26 KG/M2

## 2023-06-29 DIAGNOSIS — Z3A.39 39 WEEKS GESTATION OF PREGNANCY: ICD-10-CM

## 2023-06-29 DIAGNOSIS — O09.523 MULTIGRAVIDA OF ADVANCED MATERNAL AGE IN THIRD TRIMESTER: Primary | ICD-10-CM

## 2023-06-29 DIAGNOSIS — O09.513 AMA (ADVANCED MATERNAL AGE) PRIMIGRAVIDA 35+, THIRD TRIMESTER: Primary | ICD-10-CM

## 2023-06-29 DIAGNOSIS — O48.0 POST-TERM PREGNANCY, 40-42 WEEKS OF GESTATION: ICD-10-CM

## 2023-06-29 PROCEDURE — 99999 PR PBB SHADOW E&M-EST. PATIENT-LVL II: ICD-10-PCS | Mod: PBBFAC,,, | Performed by: OBSTETRICS & GYNECOLOGY

## 2023-06-29 PROCEDURE — 0502F SUBSEQUENT PRENATAL CARE: CPT | Mod: CPTII,S$GLB,, | Performed by: OBSTETRICS & GYNECOLOGY

## 2023-06-29 PROCEDURE — 0502F PR SUBSEQUENT PRENATAL CARE: ICD-10-PCS | Mod: CPTII,S$GLB,, | Performed by: OBSTETRICS & GYNECOLOGY

## 2023-06-29 PROCEDURE — 99999 PR PBB SHADOW E&M-EST. PATIENT-LVL II: CPT | Mod: PBBFAC,,, | Performed by: OBSTETRICS & GYNECOLOGY

## 2023-06-29 NOTE — TELEPHONE ENCOUNTER
I can request 7/6 at [facility] but it is completely booked.      The only times open next week is 7/2 [facility] and 5am, 7/3 at 5pm, or 7/7 at 5pm.  Let me know what you would like me to do.

## 2023-06-29 NOTE — PROGRESS NOTES
No complaints. Reviewed FMC. Labor/bleeding precautions given. Discussed outpatient cervical ripening and induction next week. Induction scheduled. Will check on patient on Tuesday to see if she would like to schedule outpatient cervical ripening on Wednesday. Gave ACOG FAQ.

## 2023-06-29 NOTE — TELEPHONE ENCOUNTER
----- Message from Tala Osuna MD sent at 2023 10:41 AM CDT -----  KENDRA AND CASTILLO:  PLEASE SEND DATE AND TIME TO [facility] TO PUT ON GOOGLE AND EPIC AND TO [facility] TO PUT ON OB LIST  DON'T FORGET TO CALL PT AND LET HER KNOW ALSO#####        My patient that was scheduled at [facility] on  delivered yesterday. Please take this spot!!!!!  Patient may consider outpatient ripening too. Is considering.          Procedure: answer Y where needed       Induction     Pitocin_____     Cytotec____     Balloon___Y_     Other______         Primary____      Repeat____    BTL _____________    Cerclage _________       Indication (elective/other): elective, post dates    Date desired:   Time desired: [facility]    Due Date:     Cervical exam- (inductions only)   Dilation:   Effacement:   Station:   Consistency:   Position:      BARBOZA SCORE____________

## 2023-07-05 ENCOUNTER — PATIENT MESSAGE (OUTPATIENT)
Dept: OBSTETRICS AND GYNECOLOGY | Facility: OTHER | Age: 38
End: 2023-07-05
Payer: COMMERCIAL

## 2023-07-05 NOTE — H&P (VIEW-ONLY)
HISTORY AND PHYSICAL                                                OBSTETRICS          Subjective:       Nini Carter is a 37 y.o.  female with IUP at 40w5d weeks gestation who presents for induction of labor. This IUP is complicated by advanced maternal age, GBBS positive culture .  Patient denies contractions, denies vaginal bleeding, denies LOF.   Fetal Movement: normal.     PMHx: No past medical history on file.    PSHx:   Past Surgical History:   Procedure Laterality Date    WISDOM TOOTH EXTRACTION         All:   Review of patient's allergies indicates:   Allergen Reactions    Hydrocodone Itching    Codeine Nausea And Vomiting and Anxiety       Meds:   No medications prior to admission.       SH:   Social History     Socioeconomic History    Marital status:    Tobacco Use    Smoking status: Never    Smokeless tobacco: Never   Substance and Sexual Activity    Alcohol use: Yes    Drug use: No    Sexual activity: Yes     Birth control/protection: None     Comment: Engaged:  wedding 9-       FH:   Family History   Problem Relation Age of Onset    Breast cancer Paternal Grandmother     Cancer Paternal Grandmother     Ovarian cancer Maternal Grandmother     Cancer Maternal Grandmother     Breast cancer Maternal Aunt     Cancer Maternal Aunt     Colon cancer Neg Hx        OBHx:   OB History    Para Term  AB Living   1 0 0 0 0 0   SAB IAB Ectopic Multiple Live Births   0 0 0 0 0      # Outcome Date GA Lbr Danilo/2nd Weight Sex Delivery Anes PTL Lv   1 Current               Obstetric Comments   Menarche 11       Objective:       There were no vitals taken for this visit.    There were no vitals filed for this visit.    General:   alert, appears stated age and cooperative   Lungs:   clear to auscultation bilaterally   Heart:   regular rate and rhythm, S1, S2 normal, no murmur, click, rub or gallop   Abdomen:  soft, non-tender; bowel sounds normal; no masses,  no organomegaly    Extremities negative edema, negative erythema   FHT: See admit FHT monitoring                 TOCO: See admit FHT monitoring   Presentations: cephalic by ultrasound   Cervix:     Dilation: Closed    Effacement: 50%    Station:  Floating    Consistency: soft    Position: posterior       EFW by Leopold's: 8 lbs    Lab Review  Blood Type A NEG  GBBS: positive  Rubella: Immune  RPR: nonreactive  HIV: negative  HepB: negative       Assessment:       40w5d weeks gestation, AMA, GBBS positive    - for induction of labor (declined outpatient cervical ripening)     Plan:      Risks, benefits, alternatives and possible complications have been discussed in detail with the patient.   - Consents signed and to chart  - Admit to Labor and Delivery unit  - Epidural per Anesthesia prn  - Draw CBC, T&S    Post-Partum Hemorrhage risk - low

## 2023-07-06 ENCOUNTER — ANESTHESIA EVENT (OUTPATIENT)
Dept: OBSTETRICS AND GYNECOLOGY | Facility: OTHER | Age: 38
End: 2023-07-06
Payer: COMMERCIAL

## 2023-07-06 ENCOUNTER — HOSPITAL ENCOUNTER (INPATIENT)
Facility: OTHER | Age: 38
LOS: 2 days | Discharge: HOME | End: 2023-07-08
Attending: OBSTETRICS & GYNECOLOGY | Admitting: OBSTETRICS & GYNECOLOGY
Payer: COMMERCIAL

## 2023-07-06 ENCOUNTER — ANESTHESIA (OUTPATIENT)
Dept: OBSTETRICS AND GYNECOLOGY | Facility: OTHER | Age: 38
End: 2023-07-06
Payer: COMMERCIAL

## 2023-07-06 DIAGNOSIS — O48.0 POST-TERM PREGNANCY, 40-42 WEEKS OF GESTATION: ICD-10-CM

## 2023-07-06 DIAGNOSIS — Z34.90 ENCOUNTER FOR INDUCTION OF LABOR: Primary | ICD-10-CM

## 2023-07-06 DIAGNOSIS — O09.523 MULTIGRAVIDA OF ADVANCED MATERNAL AGE IN THIRD TRIMESTER: ICD-10-CM

## 2023-07-06 DIAGNOSIS — Z34.90 ENCOUNTER FOR ELECTIVE INDUCTION OF LABOR: ICD-10-CM

## 2023-07-06 PROBLEM — Z22.330 CARRIER OF GROUP B STREPTOCOCCUS: Status: ACTIVE | Noted: 2023-07-06

## 2023-07-06 PROBLEM — O09.513 ELDERLY PRIMIGRAVIDA IN THIRD TRIMESTER: Status: ACTIVE | Noted: 2023-07-06

## 2023-07-06 LAB
ABO + RH BLD: NORMAL
BASOPHILS # BLD AUTO: 0.05 K/UL (ref 0–0.2)
BASOPHILS NFR BLD: 0.6 % (ref 0–1.9)
BLD GP AB SCN CELLS X3 SERPL QL: NORMAL
DIFFERENTIAL METHOD: ABNORMAL
EOSINOPHIL # BLD AUTO: 0 K/UL (ref 0–0.5)
EOSINOPHIL NFR BLD: 0.5 % (ref 0–8)
ERYTHROCYTE [DISTWIDTH] IN BLOOD BY AUTOMATED COUNT: 15 % (ref 11.5–14.5)
HCT VFR BLD AUTO: 33.1 % (ref 37–48.5)
HGB BLD-MCNC: 11 G/DL (ref 12–16)
IMM GRANULOCYTES # BLD AUTO: 0.29 K/UL (ref 0–0.04)
IMM GRANULOCYTES NFR BLD AUTO: 3.3 % (ref 0–0.5)
LYMPHOCYTES # BLD AUTO: 1.8 K/UL (ref 1–4.8)
LYMPHOCYTES NFR BLD: 20.7 % (ref 18–48)
MCH RBC QN AUTO: 31.4 PG (ref 27–31)
MCHC RBC AUTO-ENTMCNC: 33.2 G/DL (ref 32–36)
MCV RBC AUTO: 95 FL (ref 82–98)
MONOCYTES # BLD AUTO: 0.7 K/UL (ref 0.3–1)
MONOCYTES NFR BLD: 7.9 % (ref 4–15)
NEUTROPHILS # BLD AUTO: 5.8 K/UL (ref 1.8–7.7)
NEUTROPHILS NFR BLD: 67 % (ref 38–73)
NRBC BLD-RTO: 0 /100 WBC
PLATELET # BLD AUTO: 178 K/UL (ref 150–450)
PMV BLD AUTO: 11.7 FL (ref 9.2–12.9)
RBC # BLD AUTO: 3.5 M/UL (ref 4–5.4)
SPECIMEN OUTDATE: NORMAL
WBC # BLD AUTO: 8.69 K/UL (ref 3.9–12.7)

## 2023-07-06 PROCEDURE — 63600175 PHARM REV CODE 636 W HCPCS: Performed by: OBSTETRICS & GYNECOLOGY

## 2023-07-06 PROCEDURE — 11000001 HC ACUTE MED/SURG PRIVATE ROOM

## 2023-07-06 PROCEDURE — 85025 COMPLETE CBC W/AUTO DIFF WBC: CPT

## 2023-07-06 PROCEDURE — 59409 PRA ETRICAL CARE,VAG DELIV ONLY: ICD-10-PCS | Mod: AA,,, | Performed by: ANESTHESIOLOGY

## 2023-07-06 PROCEDURE — 86900 BLOOD TYPING SEROLOGIC ABO: CPT

## 2023-07-06 PROCEDURE — 25000003 PHARM REV CODE 250: Performed by: OBSTETRICS & GYNECOLOGY

## 2023-07-06 PROCEDURE — 59400 OBSTETRICAL CARE: CPT | Mod: GB,,, | Performed by: OBSTETRICS & GYNECOLOGY

## 2023-07-06 PROCEDURE — 59409 OBSTETRICAL CARE: CPT | Mod: AA,,, | Performed by: ANESTHESIOLOGY

## 2023-07-06 PROCEDURE — 59400 PR FULL ROUT OBSTE CARE,VAGINAL DELIV: ICD-10-PCS | Mod: GB,,, | Performed by: OBSTETRICS & GYNECOLOGY

## 2023-07-06 PROCEDURE — 27200710 HC EPIDURAL INFUSION PUMP SET: Performed by: ANESTHESIOLOGY

## 2023-07-06 PROCEDURE — C1751 CATH, INF, PER/CENT/MIDLINE: HCPCS | Performed by: ANESTHESIOLOGY

## 2023-07-06 PROCEDURE — 62326 NJX INTERLAMINAR LMBR/SAC: CPT | Performed by: ANESTHESIOLOGY

## 2023-07-06 PROCEDURE — 51702 INSERT TEMP BLADDER CATH: CPT

## 2023-07-06 PROCEDURE — 25000003 PHARM REV CODE 250: Performed by: ANESTHESIOLOGY

## 2023-07-06 RX ORDER — MISOPROSTOL 200 UG/1
800 TABLET ORAL ONCE AS NEEDED
Status: CANCELLED | OUTPATIENT
Start: 2023-07-06

## 2023-07-06 RX ORDER — OXYTOCIN 10 [USP'U]/ML
10 INJECTION, SOLUTION INTRAMUSCULAR; INTRAVENOUS ONCE AS NEEDED
Status: CANCELLED | OUTPATIENT
Start: 2023-07-06 | End: 2034-12-02

## 2023-07-06 RX ORDER — TRANEXAMIC ACID 10 MG/ML
1000 INJECTION, SOLUTION INTRAVENOUS ONCE AS NEEDED
Status: CANCELLED | OUTPATIENT
Start: 2023-07-06 | End: 2034-12-02

## 2023-07-06 RX ORDER — IBUPROFEN 600 MG/1
600 TABLET ORAL EVERY 6 HOURS
Status: CANCELLED | OUTPATIENT
Start: 2023-07-07

## 2023-07-06 RX ORDER — ONDANSETRON 8 MG/1
8 TABLET, ORALLY DISINTEGRATING ORAL EVERY 8 HOURS PRN
Status: CANCELLED | OUTPATIENT
Start: 2023-07-06

## 2023-07-06 RX ORDER — TRANEXAMIC ACID 10 MG/ML
1000 INJECTION, SOLUTION INTRAVENOUS ONCE AS NEEDED
Status: DISCONTINUED | OUTPATIENT
Start: 2023-07-06 | End: 2023-07-08 | Stop reason: HOSPADM

## 2023-07-06 RX ORDER — FENTANYL/BUPIVACAINE/NS/PF 2MCG/ML-.1
PLASTIC BAG, INJECTION (ML) INJECTION
Status: COMPLETED
Start: 2023-07-06 | End: 2023-07-06

## 2023-07-06 RX ORDER — OXYTOCIN/RINGER'S LACTATE 30/500 ML
95 PLASTIC BAG, INJECTION (ML) INTRAVENOUS ONCE
Status: DISCONTINUED | OUTPATIENT
Start: 2023-07-06 | End: 2023-07-08 | Stop reason: HOSPADM

## 2023-07-06 RX ORDER — DIPHENHYDRAMINE HCL 25 MG
25 CAPSULE ORAL EVERY 4 HOURS PRN
Status: CANCELLED | OUTPATIENT
Start: 2023-07-06

## 2023-07-06 RX ORDER — DIPHENHYDRAMINE HYDROCHLORIDE 50 MG/ML
25 INJECTION INTRAMUSCULAR; INTRAVENOUS EVERY 4 HOURS PRN
Status: CANCELLED | OUTPATIENT
Start: 2023-07-06

## 2023-07-06 RX ORDER — SODIUM CHLORIDE, SODIUM LACTATE, POTASSIUM CHLORIDE, CALCIUM CHLORIDE 600; 310; 30; 20 MG/100ML; MG/100ML; MG/100ML; MG/100ML
INJECTION, SOLUTION INTRAVENOUS CONTINUOUS
Status: DISCONTINUED | OUTPATIENT
Start: 2023-07-06 | End: 2023-07-08 | Stop reason: HOSPADM

## 2023-07-06 RX ORDER — OXYTOCIN/RINGER'S LACTATE 30/500 ML
41.65 PLASTIC BAG, INJECTION (ML) INTRAVENOUS CONTINUOUS
Status: CANCELLED | OUTPATIENT
Start: 2023-07-06 | End: 2023-07-07

## 2023-07-06 RX ORDER — MISOPROSTOL 200 UG/1
800 TABLET ORAL ONCE AS NEEDED
Status: DISCONTINUED | OUTPATIENT
Start: 2023-07-06 | End: 2023-07-08 | Stop reason: HOSPADM

## 2023-07-06 RX ORDER — SODIUM CHLORIDE 9 MG/ML
INJECTION, SOLUTION INTRAVENOUS
Status: DISCONTINUED | OUTPATIENT
Start: 2023-07-06 | End: 2023-07-08 | Stop reason: HOSPADM

## 2023-07-06 RX ORDER — CALCIUM CARBONATE 200(500)MG
500 TABLET,CHEWABLE ORAL 3 TIMES DAILY PRN
Status: DISCONTINUED | OUTPATIENT
Start: 2023-07-06 | End: 2023-07-08 | Stop reason: HOSPADM

## 2023-07-06 RX ORDER — OXYTOCIN/RINGER'S LACTATE 30/500 ML
334 PLASTIC BAG, INJECTION (ML) INTRAVENOUS ONCE
Status: DISCONTINUED | OUTPATIENT
Start: 2023-07-06 | End: 2023-07-08 | Stop reason: HOSPADM

## 2023-07-06 RX ORDER — METHYLERGONOVINE MALEATE 0.2 MG/ML
200 INJECTION INTRAVENOUS
Status: DISCONTINUED | OUTPATIENT
Start: 2023-07-06 | End: 2023-07-08 | Stop reason: HOSPADM

## 2023-07-06 RX ORDER — MISOPROSTOL 200 UG/1
800 TABLET ORAL ONCE AS NEEDED
Status: CANCELLED | OUTPATIENT
Start: 2023-07-06 | End: 2034-12-02

## 2023-07-06 RX ORDER — OXYTOCIN/RINGER'S LACTATE 30/500 ML
334 PLASTIC BAG, INJECTION (ML) INTRAVENOUS ONCE AS NEEDED
Status: DISCONTINUED | OUTPATIENT
Start: 2023-07-06 | End: 2023-07-08 | Stop reason: HOSPADM

## 2023-07-06 RX ORDER — LIDOCAINE HYDROCHLORIDE AND EPINEPHRINE 15; 5 MG/ML; UG/ML
INJECTION, SOLUTION EPIDURAL
Status: DISCONTINUED | OUTPATIENT
Start: 2023-07-06 | End: 2023-07-06

## 2023-07-06 RX ORDER — OXYTOCIN 10 [USP'U]/ML
10 INJECTION, SOLUTION INTRAMUSCULAR; INTRAVENOUS ONCE AS NEEDED
Status: DISCONTINUED | OUTPATIENT
Start: 2023-07-06 | End: 2023-07-08 | Stop reason: HOSPADM

## 2023-07-06 RX ORDER — LIDOCAINE HYDROCHLORIDE 10 MG/ML
10 INJECTION INFILTRATION; PERINEURAL ONCE AS NEEDED
Status: DISCONTINUED | OUTPATIENT
Start: 2023-07-06 | End: 2023-07-08 | Stop reason: HOSPADM

## 2023-07-06 RX ORDER — METHYLERGONOVINE MALEATE 0.2 MG/ML
200 INJECTION INTRAVENOUS
Status: CANCELLED | OUTPATIENT
Start: 2023-07-06

## 2023-07-06 RX ORDER — MISOPROSTOL 100 MCG
25 TABLET ORAL ONCE
Status: COMPLETED | OUTPATIENT
Start: 2023-07-06 | End: 2023-07-06

## 2023-07-06 RX ORDER — PROCHLORPERAZINE EDISYLATE 5 MG/ML
5 INJECTION INTRAMUSCULAR; INTRAVENOUS EVERY 6 HOURS PRN
Status: CANCELLED | OUTPATIENT
Start: 2023-07-06

## 2023-07-06 RX ORDER — OXYTOCIN/RINGER'S LACTATE 30/500 ML
334 PLASTIC BAG, INJECTION (ML) INTRAVENOUS ONCE AS NEEDED
Status: CANCELLED | OUTPATIENT
Start: 2023-07-06 | End: 2034-12-02

## 2023-07-06 RX ORDER — ACETAMINOPHEN 325 MG/1
650 TABLET ORAL EVERY 6 HOURS PRN
Status: CANCELLED | OUTPATIENT
Start: 2023-07-06

## 2023-07-06 RX ORDER — OXYTOCIN/RINGER'S LACTATE 30/500 ML
95 PLASTIC BAG, INJECTION (ML) INTRAVENOUS ONCE AS NEEDED
Status: DISCONTINUED | OUTPATIENT
Start: 2023-07-06 | End: 2023-07-08 | Stop reason: HOSPADM

## 2023-07-06 RX ORDER — SODIUM CHLORIDE 0.9 % (FLUSH) 0.9 %
10 SYRINGE (ML) INJECTION
Status: CANCELLED | OUTPATIENT
Start: 2023-07-06

## 2023-07-06 RX ORDER — OXYTOCIN/RINGER'S LACTATE 30/500 ML
95 PLASTIC BAG, INJECTION (ML) INTRAVENOUS ONCE AS NEEDED
Status: CANCELLED | OUTPATIENT
Start: 2023-07-06 | End: 2034-12-02

## 2023-07-06 RX ORDER — PROCHLORPERAZINE EDISYLATE 5 MG/ML
5 INJECTION INTRAMUSCULAR; INTRAVENOUS EVERY 6 HOURS PRN
Status: DISCONTINUED | OUTPATIENT
Start: 2023-07-06 | End: 2023-07-07 | Stop reason: SDUPTHER

## 2023-07-06 RX ORDER — SODIUM CHLORIDE, SODIUM LACTATE, POTASSIUM CHLORIDE, CALCIUM CHLORIDE 600; 310; 30; 20 MG/100ML; MG/100ML; MG/100ML; MG/100ML
INJECTION, SOLUTION INTRAVENOUS CONTINUOUS
Status: DISCONTINUED | OUTPATIENT
Start: 2023-07-06 | End: 2023-07-06

## 2023-07-06 RX ORDER — SIMETHICONE 80 MG
1 TABLET,CHEWABLE ORAL 4 TIMES DAILY PRN
Status: DISCONTINUED | OUTPATIENT
Start: 2023-07-06 | End: 2023-07-08 | Stop reason: HOSPADM

## 2023-07-06 RX ORDER — ONDANSETRON 8 MG/1
8 TABLET, ORALLY DISINTEGRATING ORAL EVERY 8 HOURS PRN
Status: DISCONTINUED | OUTPATIENT
Start: 2023-07-06 | End: 2023-07-07 | Stop reason: SDUPTHER

## 2023-07-06 RX ORDER — OXYTOCIN/RINGER'S LACTATE 30/500 ML
0-30 PLASTIC BAG, INJECTION (ML) INTRAVENOUS CONTINUOUS
Status: DISCONTINUED | OUTPATIENT
Start: 2023-07-06 | End: 2023-07-08 | Stop reason: HOSPADM

## 2023-07-06 RX ORDER — CARBOPROST TROMETHAMINE 250 UG/ML
250 INJECTION, SOLUTION INTRAMUSCULAR
Status: DISCONTINUED | OUTPATIENT
Start: 2023-07-06 | End: 2023-07-08 | Stop reason: HOSPADM

## 2023-07-06 RX ORDER — DOCUSATE SODIUM 100 MG/1
200 CAPSULE, LIQUID FILLED ORAL 2 TIMES DAILY PRN
Status: CANCELLED | OUTPATIENT
Start: 2023-07-06

## 2023-07-06 RX ORDER — CARBOPROST TROMETHAMINE 250 UG/ML
250 INJECTION, SOLUTION INTRAMUSCULAR
Status: CANCELLED | OUTPATIENT
Start: 2023-07-06

## 2023-07-06 RX ORDER — FENTANYL/BUPIVACAINE/NS/PF 2MCG/ML-.1
PLASTIC BAG, INJECTION (ML) INJECTION
Status: DISCONTINUED | OUTPATIENT
Start: 2023-07-06 | End: 2023-07-06

## 2023-07-06 RX ORDER — OXYTOCIN/RINGER'S LACTATE 30/500 ML
0-30 PLASTIC BAG, INJECTION (ML) INTRAVENOUS CONTINUOUS
Status: DISCONTINUED | OUTPATIENT
Start: 2023-07-06 | End: 2023-07-06

## 2023-07-06 RX ORDER — HYDROCORTISONE 25 MG/G
CREAM TOPICAL 3 TIMES DAILY PRN
Status: CANCELLED | OUTPATIENT
Start: 2023-07-06

## 2023-07-06 RX ADMIN — LIDOCAINE HYDROCHLORIDE,EPINEPHRINE BITARTRATE 3 ML: 15; .005 INJECTION, SOLUTION EPIDURAL; INFILTRATION; INTRACAUDAL; PERINEURAL at 11:07

## 2023-07-06 RX ADMIN — DEXTROSE MONOHYDRATE 5 MILLION UNITS: 50 INJECTION, SOLUTION INTRAVENOUS at 01:07

## 2023-07-06 RX ADMIN — DEXTROSE MONOHYDRATE 3 MILLION UNITS: 50 INJECTION, SOLUTION INTRAVENOUS at 05:07

## 2023-07-06 RX ADMIN — SODIUM CHLORIDE, POTASSIUM CHLORIDE, SODIUM LACTATE AND CALCIUM CHLORIDE: 600; 310; 30; 20 INJECTION, SOLUTION INTRAVENOUS at 05:07

## 2023-07-06 RX ADMIN — SODIUM CHLORIDE, POTASSIUM CHLORIDE, SODIUM LACTATE AND CALCIUM CHLORIDE 500 ML: 600; 310; 30; 20 INJECTION, SOLUTION INTRAVENOUS at 11:07

## 2023-07-06 RX ADMIN — DEXTROSE MONOHYDRATE 3 MILLION UNITS: 50 INJECTION, SOLUTION INTRAVENOUS at 06:07

## 2023-07-06 RX ADMIN — Medication 8 ML/HR: at 11:07

## 2023-07-06 RX ADMIN — DEXTROSE MONOHYDRATE 3 MILLION UNITS: 50 INJECTION, SOLUTION INTRAVENOUS at 02:07

## 2023-07-06 RX ADMIN — DEXTROSE MONOHYDRATE 3 MILLION UNITS: 50 INJECTION, SOLUTION INTRAVENOUS at 09:07

## 2023-07-06 RX ADMIN — Medication 5 ML: at 11:07

## 2023-07-06 RX ADMIN — Medication 4 MILLI-UNITS/MIN: at 05:07

## 2023-07-06 RX ADMIN — MISOPROSTOL 25 MCG: 100 TABLET ORAL at 01:07

## 2023-07-06 NOTE — CARE UPDATE
Called to the bedside for VB.  Patient reports having a large clot in the toilet.  Exam revealed a small amount of mucus, bloody discharge c/w bloody show.  NST with 120s reactive and reassuring.  Patient reassured.  Will continue current management.

## 2023-07-06 NOTE — ANESTHESIA PROCEDURE NOTES
Epidural    Patient location during procedure: OB   Reason for block: primary anesthetic   Reason for block: labor analgesia requested by patient and obstetrician  Diagnosis: IUP   Start time: 7/6/2023 11:37 AM  Timeout: 7/6/2023 11:35 AM  End time: 7/6/2023 11:42 AM    Staffing  Performing Provider: Stephanie Eden MD  Authorizing Provider: Stephanie Eden MD        Preanesthetic Checklist  Completed: patient identified, IV checked, site marked, risks and benefits discussed, surgical consent, monitors and equipment checked, pre-op evaluation, timeout performed, anesthesia consent given, hand hygiene performed and patient being monitored  Preparation  Patient position: sitting  Prep: ChloraPrep  Patient monitoring: Pulse Ox and Blood Pressure  Reason for block: primary anesthetic   Epidural  Skin Anesthetic: lidocaine 1%  Skin Wheal: 3 mL  Administration type: continuous  Approach: midline  Interspace: L3-4    Injection technique: KRISHNA saline  Needle and Epidural Catheter  Needle type: Tuohy   Needle gauge: 17  Needle length: 3.5 inches  Needle insertion depth: 6 cm  Catheter type: springwound and multi-orifice  Catheter size: 19 G  Catheter at skin depth: 10 cm  Insertion Attempts: 1  Test dose: 3 mL of lidocaine 1.5% with Epi 1-to-200,000  Additional Documentation: incremental injection, negative aspiration for heme and CSF, no paresthesia on injection, no signs/symptoms of IV or SA injection, no significant pain on injection and no significant complaints from patient  Needle localization: anatomical landmarks  Assessment  Ease of block: easy  Patient's tolerance of the procedure: comfortable throughout block and no complaints No inadvertent dural puncture with Tuohy.  Dural puncture performed with spinal needle.

## 2023-07-06 NOTE — INTERVAL H&P NOTE
The patient has been examined and the H&P has been reviewed:    I concur with the findings and no changes have occurred since H&P was written.    SVE cl/th/-3/posterior  Discussed various forms of induction and procedures to aid in cervical dilation. Rare, nonpalpated readings on Novii toco- nothing felt by patient. Pt agrees to misoprostol at this time, discussed possible corrales balloon at future check.   GBS +: PCN  RH neg: rhogam workup at delivery, received 4/6 per Dr. Osuna notes  Cephalic on USG  Cat I tracing    Pt declines resident involvement at this time, but agrees to their involvement if fetal or maternal emergency arises.     Cecy Puente MD  7/6/2023 1:21 AM          Active Hospital Problems    Diagnosis  POA    *Encounter for induction of labor [Z34.90]  Not Applicable      Resolved Hospital Problems   No resolved problems to display.

## 2023-07-06 NOTE — PROGRESS NOTES
LABOR NOTE    S:  Complaints: Yes - alfredo.  Epidural working:  not applicable       O: BP (!) 101/55   Pulse 65   Temp 97.6 °F (36.4 °C) (Oral)   Resp 18   SpO2 99%   Breastfeeding No       FHT: 135 Cat 1 (reassuring)  CTX: q 2-4  minutes  SVE: 4/80/-2, AROM clear fluid      ASSESSMENT:   37 y.o.  at 40w5d, labor    FHT reassuring    Active Hospital Problems    Diagnosis  POA    *Encounter for induction of labor [Z34.90]  Not Applicable    Elderly primigravida in third trimester [O09.513]  Yes    Carrier of group B Streptococcus [Z22.330]  Not Applicable      Resolved Hospital Problems   No resolved problems to display.         PLAN:    Continue Close Maternal/Fetal Monitoring  Pitocin Augmentation per protocol  Recheck 2 hours or PRN  Anticipate   PCN for GBBS.    Tala Osuna MD  Attending  Obstetrics and Gynecology

## 2023-07-06 NOTE — CARE UPDATE
Interval History:  Nini is a 37 y.o.  at 40w5d. She is doing well. Feeling more discomfort    Objective:     Vital Signs (Most Recent):  Temp: 97.6 °F (36.4 °C) (23 0553)  Pulse: 70 (23 0553)  Resp: 18 (23 0553)  BP: (!) 101/55 (23 0553)  SpO2: 99 % (23 0553) Vital Signs (24h Range):  Temp:  [97.6 °F (36.4 °C)-98.3 °F (36.8 °C)] 97.6 °F (36.4 °C)  Pulse:  [70-78] 70  Resp:  [16-18] 18  SpO2:  [99 %-100 %] 99 %  BP: (101-122)/(55-66) 101/55        There is no height or weight on file to calculate BMI.    FHT: 130 Cat 1 (reassuring)  TOCO:  irregular     Cervical Exam: (pt with difficulty tolerating check, unable to reach inner os  Dilation:  2  Effacement:  50%  Station: -3  Presentation: Vertex     Significant Labs:  Lab Results   Component Value Date    GROUPTRH A NEG 2023    HEPBSAG Non-reactive 2022    STREPBCULT (A) 2023     STREPTOCOCCUS AGALACTIAE (GROUP B)  In case of Penicillin allergy, call lab for further testing.  Beta-hemolytic streptococci are routinely susceptible to   penicillins,cephalosporins and carbapenems.  Susceptibility testing not routinely performed         I have personallly reviewed all pertinent lab results from the last 24 hours.    Assessment:   Nini Carter is a 37 y.o. female  @ 40w5d admitted for induction of labor     IOL  - ineligible for misoprostol due to contractions   - pt declines corrales balloon placement at this time, amenable to pitocin  - recheck in 4 hours, readdress at that time  - birth plan at bedside, agrees to IV access, continuous FHT    GBS positive  - PCN    Anemia  - 11.0/33.1/178

## 2023-07-06 NOTE — ANESTHESIA PREPROCEDURE EVALUATION
[facility]  Anesthesia Pre-Operative Evaluation         Patient Name: Nini Carter  YOB: 1985  MRN: [id number]    2023      Nini Carter is a 37 y.o. female  @ 40w5d who presents for term IOL. She denies asthma, cardiopulmonary disease, coagulopathy, or spinal pathology.      OB History    Para Term  AB Living   1 0 0 0 0 0   SAB IAB Ectopic Multiple Live Births   0 0 0 0 0      # Outcome Date GA Lbr Danilo/2nd Weight Sex Delivery Anes PTL Lv   1 Current               Obstetric Comments   Menarche 11       Review of patient's allergies indicates:   Allergen Reactions    Hydrocodone Itching    Codeine Nausea And Vomiting and Anxiety       Wt Readings from Last 1 Encounters:   23 1023 75.3 kg (166 lb 0.1 oz)       BP Readings from Last 3 Encounters:   23 110/70   23 120/68   06/15/23 96/68       Patient Active Problem List   Diagnosis    Low ferritin    Polyglandular dysfunction    Vitamin D deficiency    Encounter for induction of labor       Past Surgical History:   Procedure Laterality Date    WISDOM TOOTH EXTRACTION         Social History     Socioeconomic History    Marital status:    Tobacco Use    Smoking status: Never    Smokeless tobacco: Never   Substance and Sexual Activity    Alcohol use: Yes    Drug use: No    Sexual activity: Yes     Birth control/protection: None     Comment: Engaged:  wedding 11-         Chemistry    No results found for: NA, K, CL, CO2, BUN, CREATININE, GLU No results found for: CALCIUM, ALKPHOS, AST, ALT, BILITOT, ESTGFRAFRICA, EGFRNONAA         Lab Results   Component Value Date    WBC 7.38 2023    HGB 10.1 (L) 2023    HCT 32.9 (L) 2023     (H) 2023     2023       No results for input(s): PT, INR, PROTIME, APTT in the last 72 hours.            Pre-op Assessment    I have reviewed the Patient Summary  Reports.     I have reviewed the Nursing Notes. I have reviewed the NPO Status.   I have reviewed the Medications.     Review of Systems  Anesthesia Hx:  No problems with previous Anesthesia  History of prior surgery of interest to airway management or planning: Denies Family Hx of Anesthesia complications.   Denies Personal Hx of Anesthesia complications.   Social:  Non-Smoker, No Alcohol Use    Hematology/Oncology:  Hematology Normal   Oncology Normal     EENT/Dental:EENT/Dental Normal   Cardiovascular:   Exercise tolerance: good Denies CAD.    Denies Dysrhythmias.     Pulmonary:   Denies COPD.  Denies Sleep Apnea.    Hepatic/GI:   Denies GERD.    Neurological:   Denies Neuromuscular Disease.    Endocrine:   Denies Diabetes.    Psych:   Denies Psychiatric History.          Physical Exam  General: Well nourished, Cooperative, Alert and Oriented    Airway:  Mallampati: II   Mouth Opening: Normal  TM Distance: Normal  Tongue: Normal  Neck ROM: Normal ROM    Dental:  Intact    Chest/Lungs:  Clear to auscultation, Normal Respiratory Rate    Heart:  Rate: Normal  Rhythm: Regular Rhythm  Sounds: Normal    Abdomen:  Nontender, Soft, Normal        Anesthesia Plan  Type of Anesthesia, risks & benefits discussed:    Anesthesia Type: Gen ETT, Epidural, Spinal, CSE  Intra-op Monitoring Plan: Standard ASA Monitors  Post Op Pain Control Plan: multimodal analgesia  Induction:  IV  Airway Plan: Video  Informed Consent: Informed consent signed with the Patient and all parties understand the risks and agree with anesthesia plan.  All questions answered.   ASA Score: 2  Day of Surgery Review of History & Physical: I have interviewed and examined the patient. I have reviewed the patient's H&P dated: There are no significant changes.     Ready For Surgery From Anesthesia Perspective.     .

## 2023-07-06 NOTE — PROGRESS NOTES
Labor Progress Note        Subjective:      Patient currently  mild discomfort and trying to rest .     Objective:      Temp:  [97.6 °F (36.4 °C)-98.3 °F (36.8 °C)] 97.6 °F (36.4 °C)  Pulse:  [70-78] 70  Resp:  [16-18] 18  SpO2:  [99 %-100 %] 99 %  BP: (101-122)/(55-66) 101/55  There is no height or weight on file to calculate BMI.     General: no acute distress  Electronic Fetal Monitoring:  FHT: 130 bpm, moderate variability, accelerations present, decelerations absent   Category: 1                 TOCO: Contractions: regular, every 2 minutes   Cervix:2/50/-3       Patient Active Problem List   Diagnosis    Low ferritin    Polyglandular dysfunction    Vitamin D deficiency    Encounter for induction of labor    Elderly primigravida in third trimester    Carrier of group B Streptococcus        Assessment:     1. IUP at  here for induction of labor  2.   Group & Rh   Date Value Ref Range Status   2023 A NEG  Final     3. Category 1 FHT     Plan:     1. Continue active management of labor  2. Pitocin at 6mu/min  3. Reassuring FHT  4. Anticipate   5. PCN for GBS    Maci Reveles MD,MERRITT  Date and Time: 2023 7:23 AM  OB Hospitalist

## 2023-07-06 NOTE — PROGRESS NOTES
Labor Progress Note        Subjective:      Patient currently doing well without complaints. Epidural with adequate pain control    Objective:      Temp:  [97.5 °F (36.4 °C)-98.3 °F (36.8 °C)] 98 °F (36.7 °C)  Pulse:  [0-98] 70  Resp:  [16-18] 16  SpO2:  [99 %-100 %] 99 %  BP: ()/(51-72) 97/51  There is no height or weight on file to calculate BMI.     General: no acute distress  Electronic Fetal Monitoring:  FHT: 130 bpm, moderate variability, accelerations present, decelerations absent   Category: 1                 TOCO: Contractions: regular, every 4 minutes   Cervix:5/90/-2     Assessment:     Patient Active Problem List   Diagnosis    Low ferritin    Polyglandular dysfunction    Vitamin D deficiency    Encounter for induction of labor    Elderly primigravida in third trimester    Carrier of group B Streptococcus      1. IUP at  here for induction of labor  2.   Group & Rh   Date Value Ref Range Status   2023 A NEG  Final     3. Category 1 FHT     Plan:     1. Continue active management of labor  2. Pitocin at 8 mu/min  3. Reassuring FHT  4. Anticipate .  5. Continue PCN for GBS.    Maci Reveles MD,MERRITT  Date and Time: 2023 2:11 PM  OB Hospitalist

## 2023-07-06 NOTE — PLAN OF CARE
This patient has been screened for Social Work discharge planning needs. Based on  documentation in medical record , no discharge planning needs are anticipated at this time. Should any discharge planning needs arise, please consult .       [id number] [phone]   OB SCREEN   Assessment Type Discharge Planning Assessment   Source of Information health record   Received Prenatal Care Yes   Any indications/suspicions for None   Is this a teen pregnancy No   Is the baby in NICU No   Indication for adoption/Safe Haven No   Indication for DME/post-acute needs No   HIV (+) No   Any congenital  disorders No   Fetal demise/ death No     [place]  [phone]

## 2023-07-06 NOTE — PLAN OF CARE
Pt received arousable, alert, breathing spontaneously on room air in; no obvious physical distress  YVROSE via PIV to left arm for hydration, site WNL  Labor augmentated with PIT, same titrated as per orders. Nil uterine resuscitation management reqyured this shift.  Epidural insitu, working well. Pain = 0. Site WNL.  Foleys insitu draining min arnulfo urine. Site WNL.  Clear liquids encouraged, tolerated well.  Positioning done at pt discretion, limited use of peanut ball tolerated for pelvic mobility and optimization.  Interventions and assessments as per charted.  Education, advice, reassurance and psych support on the labor management and delivery process provided.  Close obs ensured. Management continues. Allowed to progress.

## 2023-07-07 LAB
ABO + RH BLD: NORMAL
BASOPHILS # BLD AUTO: 0.07 K/UL (ref 0–0.2)
BASOPHILS NFR BLD: 0.4 % (ref 0–1.9)
BLD GP AB SCN CELLS X3 SERPL QL: NORMAL
DIFFERENTIAL METHOD: ABNORMAL
EOSINOPHIL # BLD AUTO: 0.1 K/UL (ref 0–0.5)
EOSINOPHIL NFR BLD: 0.7 % (ref 0–8)
ERYTHROCYTE [DISTWIDTH] IN BLOOD BY AUTOMATED COUNT: 15.4 % (ref 11.5–14.5)
FETAL CELL SCN BLD QL ROSETTE: NORMAL
HCT VFR BLD AUTO: 33.3 % (ref 37–48.5)
HGB BLD-MCNC: 10.6 G/DL (ref 12–16)
IMM GRANULOCYTES # BLD AUTO: 0.28 K/UL (ref 0–0.04)
IMM GRANULOCYTES NFR BLD AUTO: 1.4 % (ref 0–0.5)
LYMPHOCYTES # BLD AUTO: 2 K/UL (ref 1–4.8)
LYMPHOCYTES NFR BLD: 10.2 % (ref 18–48)
MCH RBC QN AUTO: 31.5 PG (ref 27–31)
MCHC RBC AUTO-ENTMCNC: 31.8 G/DL (ref 32–36)
MCV RBC AUTO: 99 FL (ref 82–98)
MONOCYTES # BLD AUTO: 1.5 K/UL (ref 0.3–1)
MONOCYTES NFR BLD: 7.6 % (ref 4–15)
NEUTROPHILS # BLD AUTO: 16 K/UL (ref 1.8–7.7)
NEUTROPHILS NFR BLD: 79.7 % (ref 38–73)
NRBC BLD-RTO: 0 /100 WBC
PLATELET # BLD AUTO: 163 K/UL (ref 150–450)
PMV BLD AUTO: 11.8 FL (ref 9.2–12.9)
RBC # BLD AUTO: 3.37 M/UL (ref 4–5.4)
WBC # BLD AUTO: 20 K/UL (ref 3.9–12.7)

## 2023-07-07 PROCEDURE — 99024 PR POST-OP FOLLOW-UP VISIT: ICD-10-PCS | Mod: ,,, | Performed by: OBSTETRICS & GYNECOLOGY

## 2023-07-07 PROCEDURE — 85461 HEMOGLOBIN FETAL: CPT | Performed by: OBSTETRICS & GYNECOLOGY

## 2023-07-07 PROCEDURE — 72100003 HC LABOR CARE, EA. ADDL. 8 HRS

## 2023-07-07 PROCEDURE — 25000003 PHARM REV CODE 250: Performed by: OBSTETRICS & GYNECOLOGY

## 2023-07-07 PROCEDURE — 72200005 HC VAGINAL DELIVERY LEVEL II

## 2023-07-07 PROCEDURE — 86900 BLOOD TYPING SEROLOGIC ABO: CPT | Performed by: OBSTETRICS & GYNECOLOGY

## 2023-07-07 PROCEDURE — 72100002 HC LABOR CARE, 1ST 8 HOURS

## 2023-07-07 PROCEDURE — 99024 POSTOP FOLLOW-UP VISIT: CPT | Mod: ,,, | Performed by: OBSTETRICS & GYNECOLOGY

## 2023-07-07 PROCEDURE — 85025 COMPLETE CBC W/AUTO DIFF WBC: CPT | Performed by: OBSTETRICS & GYNECOLOGY

## 2023-07-07 PROCEDURE — 11000001 HC ACUTE MED/SURG PRIVATE ROOM

## 2023-07-07 PROCEDURE — 36415 COLL VENOUS BLD VENIPUNCTURE: CPT | Performed by: OBSTETRICS & GYNECOLOGY

## 2023-07-07 RX ORDER — OXYTOCIN/RINGER'S LACTATE 30/500 ML
95 PLASTIC BAG, INJECTION (ML) INTRAVENOUS ONCE AS NEEDED
Status: DISCONTINUED | OUTPATIENT
Start: 2023-07-07 | End: 2023-07-08 | Stop reason: HOSPADM

## 2023-07-07 RX ORDER — OXYTOCIN 10 [USP'U]/ML
10 INJECTION, SOLUTION INTRAMUSCULAR; INTRAVENOUS ONCE AS NEEDED
Status: DISCONTINUED | OUTPATIENT
Start: 2023-07-07 | End: 2023-07-08 | Stop reason: HOSPADM

## 2023-07-07 RX ORDER — DIPHENOXYLATE HYDROCHLORIDE AND ATROPINE SULFATE 2.5; .025 MG/1; MG/1
1 TABLET ORAL 4 TIMES DAILY PRN
Status: DISCONTINUED | OUTPATIENT
Start: 2023-07-07 | End: 2023-07-08 | Stop reason: HOSPADM

## 2023-07-07 RX ORDER — DIPHENHYDRAMINE HCL 25 MG
25 CAPSULE ORAL EVERY 4 HOURS PRN
Status: CANCELLED | OUTPATIENT
Start: 2023-07-07

## 2023-07-07 RX ORDER — OXYTOCIN/RINGER'S LACTATE 30/500 ML
334 PLASTIC BAG, INJECTION (ML) INTRAVENOUS ONCE AS NEEDED
Status: DISCONTINUED | OUTPATIENT
Start: 2023-07-07 | End: 2023-07-08 | Stop reason: HOSPADM

## 2023-07-07 RX ORDER — MISOPROSTOL 200 UG/1
800 TABLET ORAL ONCE AS NEEDED
Status: DISCONTINUED | OUTPATIENT
Start: 2023-07-07 | End: 2023-07-08 | Stop reason: HOSPADM

## 2023-07-07 RX ORDER — PRENATAL WITH FERROUS FUM AND FOLIC ACID 3080; 920; 120; 400; 22; 1.84; 3; 20; 10; 1; 12; 200; 27; 25; 2 [IU]/1; [IU]/1; MG/1; [IU]/1; MG/1; MG/1; MG/1; MG/1; MG/1; MG/1; UG/1; MG/1; MG/1; MG/1; MG/1
1 TABLET ORAL DAILY
Status: CANCELLED | OUTPATIENT
Start: 2023-07-07

## 2023-07-07 RX ORDER — ACETAMINOPHEN 325 MG/1
650 TABLET ORAL EVERY 6 HOURS PRN
Status: CANCELLED | OUTPATIENT
Start: 2023-07-07

## 2023-07-07 RX ORDER — OXYTOCIN/RINGER'S LACTATE 30/500 ML
95 PLASTIC BAG, INJECTION (ML) INTRAVENOUS ONCE
Status: CANCELLED | OUTPATIENT
Start: 2023-07-07 | End: 2023-07-07

## 2023-07-07 RX ORDER — METHYLERGONOVINE MALEATE 0.2 MG/ML
200 INJECTION INTRAVENOUS
Status: DISCONTINUED | OUTPATIENT
Start: 2023-07-07 | End: 2023-07-08 | Stop reason: HOSPADM

## 2023-07-07 RX ORDER — ACETAMINOPHEN 325 MG/1
975 TABLET ORAL EVERY 8 HOURS
Status: CANCELLED | OUTPATIENT
Start: 2023-07-07 | End: 2023-07-08

## 2023-07-07 RX ORDER — MISOPROSTOL 200 UG/1
800 TABLET ORAL
Status: DISCONTINUED | OUTPATIENT
Start: 2023-07-07 | End: 2023-07-08 | Stop reason: HOSPADM

## 2023-07-07 RX ORDER — IBUPROFEN 600 MG/1
600 TABLET ORAL EVERY 6 HOURS PRN
Status: DISCONTINUED | OUTPATIENT
Start: 2023-07-07 | End: 2023-07-08 | Stop reason: HOSPADM

## 2023-07-07 RX ORDER — DIPHENHYDRAMINE HYDROCHLORIDE 50 MG/ML
25 INJECTION INTRAMUSCULAR; INTRAVENOUS EVERY 4 HOURS PRN
Status: CANCELLED | OUTPATIENT
Start: 2023-07-07

## 2023-07-07 RX ORDER — DOCUSATE SODIUM 100 MG/1
200 CAPSULE, LIQUID FILLED ORAL 2 TIMES DAILY PRN
Status: CANCELLED | OUTPATIENT
Start: 2023-07-07

## 2023-07-07 RX ORDER — PROCHLORPERAZINE EDISYLATE 5 MG/ML
5 INJECTION INTRAMUSCULAR; INTRAVENOUS EVERY 6 HOURS PRN
Status: DISCONTINUED | OUTPATIENT
Start: 2023-07-07 | End: 2023-07-08 | Stop reason: HOSPADM

## 2023-07-07 RX ORDER — SODIUM CHLORIDE 0.9 % (FLUSH) 0.9 %
10 SYRINGE (ML) INJECTION
Status: DISCONTINUED | OUTPATIENT
Start: 2023-07-07 | End: 2023-07-08 | Stop reason: HOSPADM

## 2023-07-07 RX ORDER — TRANEXAMIC ACID 10 MG/ML
1000 INJECTION, SOLUTION INTRAVENOUS ONCE AS NEEDED
Status: DISCONTINUED | OUTPATIENT
Start: 2023-07-07 | End: 2023-07-08 | Stop reason: HOSPADM

## 2023-07-07 RX ORDER — CARBOPROST TROMETHAMINE 250 UG/ML
250 INJECTION, SOLUTION INTRAMUSCULAR
Status: DISCONTINUED | OUTPATIENT
Start: 2023-07-07 | End: 2023-07-08 | Stop reason: HOSPADM

## 2023-07-07 RX ORDER — HYDROCORTISONE 25 MG/G
CREAM TOPICAL 3 TIMES DAILY PRN
Status: CANCELLED | OUTPATIENT
Start: 2023-07-07

## 2023-07-07 RX ORDER — ACETAMINOPHEN 500 MG
1000 TABLET ORAL EVERY 6 HOURS PRN
Status: DISCONTINUED | OUTPATIENT
Start: 2023-07-07 | End: 2023-07-08 | Stop reason: HOSPADM

## 2023-07-07 RX ORDER — SIMETHICONE 80 MG
1 TABLET,CHEWABLE ORAL EVERY 6 HOURS PRN
Status: CANCELLED | OUTPATIENT
Start: 2023-07-07

## 2023-07-07 RX ORDER — ONDANSETRON 8 MG/1
8 TABLET, ORALLY DISINTEGRATING ORAL EVERY 8 HOURS PRN
Status: DISCONTINUED | OUTPATIENT
Start: 2023-07-07 | End: 2023-07-08 | Stop reason: HOSPADM

## 2023-07-07 RX ADMIN — IBUPROFEN 600 MG: 600 TABLET, FILM COATED ORAL at 07:07

## 2023-07-07 NOTE — PROGRESS NOTES
POSTPARTUM PROGRESS NOTE    Subjective:     PPD/POD#: 1   Procedure:    EGA: 40w5d   N/V: No   F/C: No   Abd Pain: Mild, well-controlled with oral pain medication   Lochia: Mild   Voiding: Yes   Ambulating: Yes   Bowel fnc: Yes   Breastfeeding: Yes   Contraception: Per primary OB   Circumcision: N/A, female     Objective:      Temp:  [97.6 °F (36.4 °C)-98.1 °F (36.7 °C)] 97.6 °F (36.4 °C)  Pulse:  [0-98] 91  Resp:  [16-19] 18  SpO2:  [97 %-100 %] 100 %  BP: ()/(49-72) 112/60    Lung: Normal respiratory effort   Abdomen: Soft, appropriately tender   Uterus: Firm, no fundal tenderness   Incision: N/A   : Deferred   Extremities: Bilateral trace edema     Lab Review    No results for input(s): NA, K, CL, CO2, BUN, CREATININE, GLU, PROT, BILITOT, ALKPHOS, ALT, AST, MG, PHOS in the last 168 hours.    Recent Labs   Lab 23  0045 23  0730   WBC 8.69 20.00*   HGB 11.0* 10.6*   HCT 33.1* 33.3*   MCV 95 99*    163         I/O    Intake/Output Summary (Last 24 hours) at 2023 1036  Last data filed at 2023 0800  Gross per 24 hour   Intake 68.51 ml   Output 3433 ml   Net -3364.49 ml        Assessment and Plan:   Postpartum care:  - Patient doing well.  - Continue routine management and advances.    Rh neg  -pp Rhogam needed    Plan for discharge to home tomorrow      Monica Stokes

## 2023-07-07 NOTE — L&D DELIVERY NOTE
[facility] - Labor & Delivery  Vaginal Delivery   Operative Note    SUMMARY     Normal spontaneous vaginal delivery of live infant, was placed on mothers abdomen for skin to skin and bulb suctioning performed.  Infant delivered position OA over intact perineum.  Nuchal cord: No.    Spontaneous delivery of placenta and IV pitocin given noting good uterine tone.  2nd degree laceration noted and repaired in normal fashion with 2-0, 3-0 vicryl .  Patient tolerated delivery well. Sponge needle and lap counted correctly x2.    Indications:  (normal spontaneous vaginal delivery)  Pregnancy complicated by:   Patient Active Problem List   Diagnosis    Low ferritin    Polyglandular dysfunction    Vitamin D deficiency    Encounter for induction of labor    Elderly primigravida in third trimester    Carrier of group B Streptococcus     (normal spontaneous vaginal delivery)     Admitting GA: 40w5d    Delivery Information for Flower Carter    Birth information:  YOB: 2025   Time of birth: 9:09 PM   Sex: female   Head Delivery Date/Time: 2023  9:09 PM   Delivery type: Vaginal, Spontaneous   Gestational Age: 40w5d        Delivery Providers    Delivering clinician: Tala Osuna MD   Provider Role    Kelsey Golden RN Registered Nurse    Berta Patiño RN Registered Nurse    Charity Christianson RN Registered Nurse    Griffin Barakat, ST Surgical Tech              Measurements    Weight:   Length:          Apgars    Living status: Living  Apgars:  1 min.:  5 min.:  10 min.:  15 min.:  20 min.:    Skin color:  1  1       Heart rate:  2  2       Reflex irritability:  2  2       Muscle tone:  2  2       Respiratory effort:  2  2       Total:  9  9       Apgars assigned by: MARIA LUZ GOLDEN RN         Operative Delivery    Forceps attempted?: No  Vacuum extractor attempted?: No         Shoulder Dystocia    Shoulder dystocia present?: No           Presentation    Presentation: Vertex            Interventions/Resuscitation    Method: Bulb Suctioning, Tactile Stimulation, Deep Suctioning       Cord    Vessels: 3 vessels  Complications: None  Delayed Cord Clamping?: Yes  Cord Clamped Date/Time: 2023  9:11 PM  Cord Blood Disposition: Sent with Baby  Gases Sent?: No  Stem Cell Collection (by MD): No       Placenta    Placenta delivery date/time: 2023  Placenta removal: Spontaneous  Placenta appearance: Intact  Placenta disposition: Discarded           Labor Events:       labor: No     Labor Onset Date/Time:         Dilation Complete Date/Time:         Start Pushing Date/Time:         Start Pushing Date/Time:       Rupture Date/Time: 23  0850         Rupture type: ARM (Artificial Rupture)         Fluid Amount:       Fluid Color: Bloody               steroids: None     Antibiotics given for GBS: Yes     Induction: misoprostol     Indications for induction:  Elective     Augmentation: oxytocin     Indications for augmentation: Ineffective Contraction Pattern     Labor complications: None     Additional complications:          Cervical ripening:                     Delivery:      Episiotomy:       Indication for Episiotomy:       Perineal Lacerations:   Repaired:      Periurethral Laceration:   Repaired:     Labial Laceration:   Repaired:     Sulcus Laceration:   Repaired:     Vaginal Laceration:   Repaired:     Cervical Laceration:   Repaired:     Repair suture:       Repair # of packets:       Last Value - EBL - Nursing (mL):       Sum - EBL - Nursing (mL): 0     Last Value - EBL - Anesthesia (mL):      Calculated QBL (mL): 433      Vaginal Sweep Performed:       Surgicount Correct: Yes     Vaginal Packing:   Quantity:       Other providers:       Anesthesia    Method: Epidural          Details (if applicable):  Trial of Labor      Categorization:      Priority:     Indications for :     Incision Type:       Additional   information:  Forceps:    Vacuum:    Breech:    Observed anomalies    Other (Comments):

## 2023-07-07 NOTE — LACTATION NOTE
07/07/23 1630   Maternal Assessment   Breast Shape Left:;round   Breast Density Left:;soft   Areola Left:;elastic   Nipples Left:;everted   Maternal Infant Feeding   Maternal Emotional State assist needed  (tired, sleepy)   Infant Positioning cross-cradle   Latch Assistance yes  (max assistance provided to attempt to achieve deep latch)     Visited patient in room, baby lying on back in crib giving feeding cues, white noise machine in crib c baby.  Encouraged patient to follow baby's feeding cues, patient states baby just finished nursing recently.  Reviewed feeding cues, observed patient attempt to latch baby onto L breast, cross cradle position, no latch achieved.  Maximum positioning and latch assistance provided, L breast, cross cradle position, no deep latch achieved after several attempts,  baby latches onto tip of nipple only, removed immediately due to nipple pain.  Baby cries vigorously between latch attempts, noted notch in upper gum line where frenum attaches and obvious short lingual frenulum noted.  Short frenulum shown to parents and explained additional potential challenges to be able to achieve a deep latch.  Options discussed to protect the patient's milk production and to feed the baby till content at each feeding.  Basic education provided, Guide reviewed. Parents are expecting visitors to arrive.  Encouraged to call for assistance at the next feeding after visitors leave.

## 2023-07-07 NOTE — ANESTHESIA POSTPROCEDURE EVALUATION
Anesthesia Post Evaluation    Patient: Nini Carter    Procedure(s) Performed: * No procedures listed *    Final Anesthesia Type: epidural      Patient location during evaluation: labor & delivery  Patient participation: Yes- Able to Participate  Level of consciousness: awake and alert  Post-procedure vital signs: reviewed and stable  Pain management: adequate    PONV status at discharge: No PONV  Anesthetic complications: no      Cardiovascular status: hemodynamically stable and blood pressure returned to baseline  Respiratory status: unassisted, spontaneous ventilation and room air  Hydration status: euvolemic  Follow-up not needed.          Vitals Value Taken Time   /60 07/07/23 0855   Temp 36.4 °C (97.6 °F) 07/07/23 0855   Pulse 91 07/07/23 0855   Resp 18 07/07/23 0855   SpO2 100 % 07/07/23 0855         No case tracking events are documented in the log.      Pain/Ayah Score: No data recorded

## 2023-07-07 NOTE — PLAN OF CARE
Overnight, AVSS. Ambulating and voiding independently. Very much eager to breastfeed her baby, assistance provided.   Appropriate bonding with baby. Safety maintained.

## 2023-07-07 NOTE — PLAN OF CARE
"Plan of care:  parents will hold baby skin-to-skin as much as possible; will nurse baby on cue " 8 or more in 24" and lengthen feedings until content; will achieve and sustain a deep, comfortable latch and observe for signs of milk transfer; if unable to achieve and sustain a deep latch, mother will hand express/pump both breasts and spoon feed baby EBM/donor milk/formula ad kings; will monitor baby's 24hr diaper counts; will inform the nurse at each feeding as to feeding decision and ask for assistance at each feeding.  "

## 2023-07-07 NOTE — DISCHARGE INSTRUCTIONS
Discharge Instructions    The AAP recommends exclusive breastfeeding for about the first 6 months of age and as solid foods are introduced, continued breastfeeding  for at least 2 years or longer.    Feed the baby at the earliest sign of hunger or comfort (see signs on the back cover of the Mother's Breastfeeding Guide)  Hands to mouth, sucking motions  Rooting or searching for something to suck on  Dont wait for crying - it is a sign of distress    The feedings may be 8-12 times per 24hrs and will not follow a schedule  Avoid pacifiers and bottles for the first 4 weeks  Alternate the breast you start the feeding with, or start with the breast that feels the fullest  Switch breasts when the baby takes himself off the breast or falls asleep  Keep offering breasts until the baby looks full, no longer gives hunger signs, and stays asleep when placed on his back in the crib  If the baby is sleepy and wont wake for a feeding, put the baby skin-to-skin dressed in a diaper against the mothers bare chest  Sleep with your baby in your room near you  The baby should be positioned and latched on to the breast correctly  Chest-to-chest, chin in the breast  Babys lips are flipped outward  Babys mouth is stretched open wide like a shout  Babys sucking should feel like tugging to the mother  The baby should be drinking at the breast:  You should hear swallowing or gulping throughout the feeding  You should see milk on the babys lips when he comes off the breast  Your breasts should be softer when the baby is finished feeding  The baby should look relaxed at the end of feedings  After the 4th day and your milk is in:  The babys poop should turn bright yellow and be loose, watery, and seedy  The baby should have at least 3-4 poops the size of the palm of your hand per day  The baby should have at least 5-6 wet diapers per day  The urine should be light yellow in color  You should drink when you are thirsty and eat a  healthy diet when you are    hungry.     Take naps to get the rest you need.   Take medications and/or drink alcohol only with permission of your obstetrician    or the babys pediatrician.  You can also call the Infant Risk Center,   ([phone]), Monday-Friday, 8am-5pm Central time, to get the most   up-to-date evidence-based information on the use of medications during   pregnancy and breastfeeding.      Complete the First Alert form in the Mother's Breastfeeding Guide 3-5 days after the baby's birth.  Please call the Breastfeeding Warmline ([phone]) or the baby's pediatrician if you have any concerns.    The baby should be examined by a pediatrician at 3-5 days of age and again at 2 weeks of age.  Once your milk production increases, the baby should be gaining at least ½ - 1oz each day and should be back to birthweight no later than 10-14 days of age.  If this is not the case, please call the Breastfeeding Warmline ([phone]) for assistance and support.    Community Resources    [facility] Breastfeeding Warmline: [phone]   [facility] clinics: provide incentives and breastpumps to eligible mothers 1-062-713-BABY  La Leche League International (LLLI):  mother-to-mother support group website        www.lll.[employer]oractive  [facility] La Leche League mother-to-mother support groups:        www.lllIT MOVES IT.[employer] [place]        La Leche League [facility]   Dr. Erik Larkin website for latch videos and general information:        www.breastfeedinginc.ca  [facility]ant Risk Center is a call center that provides information about the safety of taking medications while breastfeeding.  Call 2-148-522-1319, M-F, 8am-5pm, [place].  International Lactation Consultant Association provides resources for assistance:        www.ilca.org  [place] Breastfeeding Coalition provides informationand resources for parents  and the community    www.LaBreastfeedingSupport.org  [place]nes is a mom-to-mom support group:                              www.nolanesting.com//breastfeedng-support/  [employer] Healthy Babies:  6-591-336-BABY(8770)  [employer]: a breastfeeding support group for women of color, 570.623.1775

## 2023-07-07 NOTE — PLAN OF CARE
Mom VSS. Breastfeeding . Mom is walking and voiding . Pain control with PO pain medication and family is at the bedside .

## 2023-07-07 NOTE — PROGRESS NOTES
Labor Progress Note        Subjective:      Patient currently  patient reports having [id number] .     Objective:      Temp:  [97.5 °F (36.4 °C)-98.3 °F (36.8 °C)] 97.8 °F (36.6 °C)  Pulse:  [0-98] 83  Resp:  [16-18] 18  SpO2:  [99 %-100 %] 99 %  BP: ()/(49-72) 122/58  There is no height or weight on file to calculate BMI.     General: no acute distress  Electronic Fetal Monitoring:  FHT: 130 bpm, moderate variability, accelerations present, decelerations absent   Category: 1                 TOCO: Contractions: regular, every 3-4 minutes   Cervix:C/C/+2     Assessment:     1. IUP at  here for induction of labor  2.   Group & Rh   Date Value Ref Range Status   2023 A NEG  Final     3. Category 1 FHT     Plan:     1. Continue active management of labor  2. Will start pushing   3. Reassuring FHT  4. Anticipate .    Maci Reveles MD,MERRITT  Date and Time: 2023 7:09 PM  OB Hospitalist

## 2023-07-08 VITALS
OXYGEN SATURATION: 100 % | RESPIRATION RATE: 18 BRPM | TEMPERATURE: 98 F | SYSTOLIC BLOOD PRESSURE: 114 MMHG | DIASTOLIC BLOOD PRESSURE: 68 MMHG | HEART RATE: 93 BPM

## 2023-07-08 LAB — INJECT RH IG VOL PATIENT: NORMAL ML

## 2023-07-08 PROCEDURE — 99024 POSTOP FOLLOW-UP VISIT: CPT | Mod: ,,, | Performed by: OBSTETRICS & GYNECOLOGY

## 2023-07-08 PROCEDURE — 99024 PR POST-OP FOLLOW-UP VISIT: ICD-10-PCS | Mod: ,,, | Performed by: OBSTETRICS & GYNECOLOGY

## 2023-07-08 PROCEDURE — 63600519 RHOGAM PHARM REV CODE 636 ALT 250 W HCPCS: Performed by: OBSTETRICS & GYNECOLOGY

## 2023-07-08 RX ORDER — DOCUSATE SODIUM 100 MG/1
100 CAPSULE, LIQUID FILLED ORAL 2 TIMES DAILY
Qty: 30 CAPSULE | Refills: 0 | Status: SHIPPED | OUTPATIENT
Start: 2023-07-08 | End: 2023-08-02

## 2023-07-08 RX ORDER — IBUPROFEN 600 MG/1
600 TABLET ORAL EVERY 6 HOURS PRN
Qty: 60 TABLET | Refills: 0 | Status: SHIPPED | OUTPATIENT
Start: 2023-07-08 | End: 2023-08-02

## 2023-07-08 RX ORDER — DEXTROMETHORPHAN HYDROBROMIDE, GUAIFENESIN 5; 100 MG/5ML; MG/5ML
650 LIQUID ORAL EVERY 6 HOURS PRN
Qty: 60 TABLET | Refills: 0 | Status: SHIPPED | OUTPATIENT
Start: 2023-07-08 | End: 2023-08-02

## 2023-07-08 RX ADMIN — HUMAN RHO(D) IMMUNE GLOBULIN 300 MCG: 300 INJECTION, SOLUTION INTRAMUSCULAR at 11:07

## 2023-07-08 NOTE — DISCHARGE SUMMARY
Delivery Discharge Summary  Obstetrics      Primary OB Clinician: Tala Osuna MD      Admission date: 2023  Discharge date: 2023    Disposition: To home, self care    Discharge Diagnosis List:      Patient Active Problem List   Diagnosis    Low ferritin    Polyglandular dysfunction    Vitamin D deficiency    Encounter for induction of labor    Elderly primigravida in third trimester    Carrier of group B Streptococcus     (normal spontaneous vaginal delivery)       Procedure:     Hospital Course:  Nini Carter is a 37 y.o. now , PPD #2 who was admitted on 2023 at 40w5d for IOL. Patient was subsequently admitted to labor and delivery unit with signed consents.     Labor course was uncomplicated and resulted in  without complications.     Please see delivery note for further details. Her postpartum course was uncomplicated. On discharge day, patient's pain is controlled with oral pain medications. Pt is tolerating ambulation without SOB or CP, and regular diet without N/V. Reports lochia is mild. Denies any HA, vision changes, F/C, LE swelling. Denies any breast pain/soreness.    Pt in stable condition and ready for discharge. She has been instructed to start and/or continue medications and follow up with her obstetrics provider as listed below.    Pertinent studies:  CBC  Recent Labs   Lab 23  0045 23  0730   WBC 8.69 20.00*   HGB 11.0* 10.6*   HCT 33.1* 33.3*   MCV 95 99*    163          Immunization History   Administered Date(s) Administered    Rho (D) Immune Globulin 2023    Rho (D) Immune Globulin - IM 2023        Delivery:    Episiotomy: None   Lacerations: 2nd   Repair suture:     Repair # of packets: 3   Blood loss (ml):       Birth information:  YOB: 2025   Time of birth: 9:09 PM   Sex: female   Delivery type: Vaginal, Spontaneous   Gestational Age: 40w5d    Delivery Clinician:      Other providers:       Additional   information:  Forceps:    Vacuum:    Breech:    Observed anomalies      Living?:           APGARS  One minute Five minutes Ten minutes   Skin color:         Heart rate:         Grimace:         Muscle tone:         Breathing:         Totals: 9  9        Placenta: Delivered:       appearance    Patient Instructions:   Current Discharge Medication List        START taking these medications    Details   acetaminophen (TYLENOL) 650 MG TbSR Take 1 tablet (650 mg total) by mouth every 6 (six) hours as needed (pain).  Qty: 60 tablet, Refills: 0      docusate sodium (COLACE) 100 MG capsule Take 1 capsule (100 mg total) by mouth 2 (two) times daily.  Qty: 30 capsule, Refills: 0      ibuprofen (ADVIL,MOTRIN) 600 MG tablet Take 1 tablet (600 mg total) by mouth every 6 (six) hours as needed for Pain.  Qty: 60 tablet, Refills: 0           CONTINUE these medications which have NOT CHANGED    Details   PRENATAL VIT 10-IRON-FOLIC-DHA ORAL Take by mouth.             Discharge Procedure Orders   Diet Adult Regular     No driving until:   Order Comments: No driving until 24 hours since last narcotic/opioid     Pelvic Rest     Notify your health care provider if you experience any of the following:  temperature >100.4     Notify your health care provider if you experience any of the following:  persistent nausea and vomiting or diarrhea     Notify your health care provider if you experience any of the following:  severe uncontrolled pain     Notify your health care provider if you experience any of the following:  redness, tenderness, or signs of infection (pain, swelling, redness, odor or green/yellow discharge around incision site)     Notify your health care provider if you experience any of the following:  severe persistent headache     Notify your health care provider if you experience any of the following:   Order Comments: Heavy bleeding saturating >1 pad per hour for 2 hours     Activity as tolerated            Cecy Castro  MD Kwame  7/8/2023 12:59 PM

## 2023-07-08 NOTE — PLAN OF CARE
Overnight, AVSS. Ambulating and voiding independently. Mother is supplementing her baby with donor's milk. Appropriate bonding with baby. Safety maintained.

## 2023-07-08 NOTE — LACTATION NOTE
07/08/23 1145   Maternal Assessment   Breast Shape Bilateral:;round   Breast Density Bilateral:;soft   Areola Bilateral:;elastic   Nipples Bilateral:;everted   Left Nipple Symptoms tender;painful   Right Nipple Symptoms tender;painful   Maternal Infant Feeding   Maternal Emotional State assist needed   Infant Positioning cross-cradle   Signs of Milk Transfer audible swallow;infant jaw motion present  (breast compression /stimulation needed.)   Pain with Feeding yes  (tenderness/pain that decreases as baby continues to breastfeed.)   Nipple Shape After Feeding, Left round   Nipple Shape After Feeding, Right round   Latch Assistance yes   Community Referrals   Community Referrals outpatient lactation program;support group   Outpatient Lactation Program Lactation Follow-up Date/Time community resources     Pt called for lactation education and assessment. Pt reports she has not been able to latch baby to breast since delivery. Assisted pt with position and latch. Baby able to latch to breast in cross cradle hold after a few attempts. Baby needed stimulation to keep actively sucking during feedings.pt reports pain/tenderness with latch that has decreased as baby as continued to suck. Discharge breastfeeding education provided. Questions answered. Pt has lactation contact number and community resources.

## 2023-07-09 ENCOUNTER — PATIENT MESSAGE (OUTPATIENT)
Dept: OBSTETRICS AND GYNECOLOGY | Facility: OTHER | Age: 38
End: 2023-07-09
Payer: COMMERCIAL

## 2023-07-19 ENCOUNTER — POSTPARTUM VISIT (OUTPATIENT)
Dept: OBSTETRICS AND GYNECOLOGY | Facility: CLINIC | Age: 38
End: 2023-07-19
Payer: COMMERCIAL

## 2023-07-19 VITALS — DIASTOLIC BLOOD PRESSURE: 62 MMHG | BODY MASS INDEX: 22.13 KG/M2 | SYSTOLIC BLOOD PRESSURE: 94 MMHG | WEIGHT: 141.31 LBS

## 2023-07-19 PROCEDURE — 0502F PR SUBSEQUENT PRENATAL CARE: ICD-10-PCS | Mod: CPTII,S$GLB,, | Performed by: NURSE PRACTITIONER

## 2023-07-19 PROCEDURE — 99999 PR PBB SHADOW E&M-EST. PATIENT-LVL III: CPT | Mod: PBBFAC,,, | Performed by: NURSE PRACTITIONER

## 2023-07-19 PROCEDURE — 99999 PR PBB SHADOW E&M-EST. PATIENT-LVL III: ICD-10-PCS | Mod: PBBFAC,,, | Performed by: NURSE PRACTITIONER

## 2023-07-19 PROCEDURE — 0502F SUBSEQUENT PRENATAL CARE: CPT | Mod: CPTII,S$GLB,, | Performed by: NURSE PRACTITIONER

## 2023-07-19 NOTE — PROGRESS NOTES
Postpartum Visit  Nini Carter is a 37 y.o. female  is here for a postpartum visit. She is 2 weeks postpartum following a spontaneous vaginal delivery, of a female infant. The delivery was at 40w5d.     Pregnancy was complicated by: AMA, GBS positive.      OB History    Para Term  AB Living   1 1 1 0 0 1   SAB IAB Ectopic Multiple Live Births   0 0 0 0 1      # Outcome Date GA Lbr Danilo/2nd Weight Sex Delivery Anes PTL Lv   1 Term 23 40w5d  3.71 kg (8 lb 2.9 oz) F Vag-Spont EPI N PAUL      Obstetric Comments   Menarche 11       Postpartum course has been uncomplicated.  Bleeding moderate lochia.    ROS:  GENERAL: No fever, chills, fatigability.  VULVAR: No pain, no lesions and no itching.  VAGINAL: No relaxation, no itching, no discharge, no abnormal bleeding and no lesions.  ABDOMEN: No abdominal pain. Denies nausea. Denies vomiting. No diarrhea. No constipation  BREAST: Denies pain. No lumps. No discharge.  URINARY: No incontinence, no nocturia, no frequency and no dysuria.  CARDIOVASCULAR: No chest pain. No shortness of breath. No leg cramps.  NEUROLOGICAL: No headaches. No vision changes.      General appearance - alert, well appearing, and in no distress  Mental status - normal mood, behavior, speech, dress, motor activity, and thought processes, crying at times  Skin - coloration normal for race, good turgor, warm to touch, no rashes  Abdomen - soft, nontender, nondistended, no masses or organomegaly      Nini was seen today for postpartum care.    Diagnoses and all orders for this visit:    Encounter for postpartum care of lactating mother      EPPDS: 7. Limited family to help with baby, loss of her mother at age 12. Struggling at times emotionally, some issues with breastfeeding and may formula feed. Denies SI/HI. Wonderful support person, . Excellent bonding with daughter.    Plan for repeat mood assessment in 2 weeks, emotional support given.        Michelle  NICOLE Burgess

## 2023-08-02 ENCOUNTER — POSTPARTUM VISIT (OUTPATIENT)
Dept: OBSTETRICS AND GYNECOLOGY | Facility: CLINIC | Age: 38
End: 2023-08-02
Payer: COMMERCIAL

## 2023-08-02 VITALS — BODY MASS INDEX: 21.79 KG/M2 | WEIGHT: 139.13 LBS | SYSTOLIC BLOOD PRESSURE: 98 MMHG | DIASTOLIC BLOOD PRESSURE: 60 MMHG

## 2023-08-02 DIAGNOSIS — F43.20 ADJUSTMENT DISORDER, UNSPECIFIED TYPE: Primary | ICD-10-CM

## 2023-08-02 PROCEDURE — 99999 PR PBB SHADOW E&M-EST. PATIENT-LVL III: CPT | Mod: PBBFAC,,, | Performed by: NURSE PRACTITIONER

## 2023-08-02 PROCEDURE — 99999 PR PBB SHADOW E&M-EST. PATIENT-LVL III: ICD-10-PCS | Mod: PBBFAC,,, | Performed by: NURSE PRACTITIONER

## 2023-08-02 PROCEDURE — 0503F PR POSTPARTUM CARE VISIT: ICD-10-PCS | Mod: CPTII,S$GLB,, | Performed by: NURSE PRACTITIONER

## 2023-08-02 PROCEDURE — 0503F POSTPARTUM CARE VISIT: CPT | Mod: CPTII,S$GLB,, | Performed by: NURSE PRACTITIONER

## 2023-08-02 NOTE — PROGRESS NOTES
History & Physical  Gynecology      SUBJECTIVE:     Chief Complaint: Postpartum Care (2 weeks post partum )       History of Present Illness: Patient presents for follow up mood check. Reports she is doing much better. Adjusting to life as a parent, limited expectations of self during this time and enjoying the moments. Denies SI/HI.         Review of patient's allergies indicates:   Allergen Reactions    Hydrocodone Itching    Codeine Nausea And Vomiting and Anxiety       History reviewed. No pertinent past medical history.  Past Surgical History:   Procedure Laterality Date    WISDOM TOOTH EXTRACTION       OB History          1    Para   1    Term   1       0    AB   0    Living   1         SAB   0    IAB   0    Ectopic   0    Multiple   0    Live Births   1           Obstetric Comments   Menarche 11             Family History   Problem Relation Age of Onset    Breast cancer Paternal Grandmother     Cancer Paternal Grandmother     Ovarian cancer Maternal Grandmother     Cancer Maternal Grandmother     Breast cancer Maternal Aunt     Cancer Maternal Aunt     Colon cancer Neg Hx      Social History     Tobacco Use    Smoking status: Never    Smokeless tobacco: Never   Substance Use Topics    Alcohol use: Yes    Drug use: No       Current Outpatient Medications   Medication Sig    PRENATAL VIT 10-IRON-FOLIC-DHA ORAL Take by mouth.     No current facility-administered medications for this visit.         Review of Systems:  Review of Systems   Constitutional:  Negative for activity change, appetite change, chills, fatigue and fever.   HENT:  Negative for mouth sores.    Eyes:  Negative for visual disturbance.   Respiratory:  Negative for cough and shortness of breath.    Cardiovascular:  Negative for chest pain and leg swelling.   Gastrointestinal:  Negative for abdominal pain, constipation, diarrhea, nausea, vomiting and reflux.   Endocrine: Negative for hyperthyroidism and hypothyroidism.    Genitourinary:  Negative for dysuria, flank pain, frequency, genital sores, hematuria, menstrual problem, pelvic pain, vaginal bleeding, vaginal discharge, vaginal pain, vaginal dryness and vaginal odor.   Musculoskeletal:  Negative for arthralgias, back pain and myalgias.   Integumentary:  Negative for rash, breast mass and breast tenderness.   Neurological:  Negative for headaches.   Hematological:  Negative for adenopathy. Does not bruise/bleed easily.   Psychiatric/Behavioral:  Negative for depression. The patient is not nervous/anxious.    Breast: Negative for asymmetry, mass and tenderness       OBJECTIVE:     Physical Exam:  Physical Exam  Vitals and nursing note reviewed.   Constitutional:       Appearance: Normal appearance.   HENT:      Head: Normocephalic and atraumatic.      Nose: Nose normal.      Mouth/Throat:      Mouth: Mucous membranes are moist.   Eyes:      Conjunctiva/sclera: Conjunctivae normal.   Cardiovascular:      Rate and Rhythm: Normal rate.   Pulmonary:      Effort: Pulmonary effort is normal.      Breath sounds: Normal breath sounds.   Abdominal:      Palpations: Abdomen is soft.   Genitourinary:     Comments: Deferred  Musculoskeletal:         General: Normal range of motion.      Cervical back: Normal range of motion.   Skin:     General: Skin is warm and dry.   Neurological:      General: No focal deficit present.      Mental Status: She is alert.   Psychiatric:         Mood and Affect: Mood normal.         Behavior: Behavior normal.         Thought Content: Thought content normal.         Judgment: Judgment normal.           ASSESSMENT:       ICD-10-CM ICD-9-CM    1. Adjustment disorder, unspecified type  F43.20 309.9              Plan:      EPPDS: 3. Adjusting to new life changes and coping effectively. SI/HI precautions.    FU in 2 weeks for postpartum visit.    Counseling time: 20 minutes       NICOLE Dixon

## 2023-08-17 ENCOUNTER — POSTPARTUM VISIT (OUTPATIENT)
Dept: OBSTETRICS AND GYNECOLOGY | Facility: CLINIC | Age: 38
End: 2023-08-17
Payer: COMMERCIAL

## 2023-08-17 VITALS
WEIGHT: 141.13 LBS | BODY MASS INDEX: 22.15 KG/M2 | HEIGHT: 67 IN | DIASTOLIC BLOOD PRESSURE: 64 MMHG | SYSTOLIC BLOOD PRESSURE: 98 MMHG

## 2023-08-17 PROCEDURE — 99999 PR PBB SHADOW E&M-EST. PATIENT-LVL III: CPT | Mod: PBBFAC,,, | Performed by: OBSTETRICS & GYNECOLOGY

## 2023-08-17 PROCEDURE — 0503F POSTPARTUM CARE VISIT: CPT | Mod: CPTII,S$GLB,, | Performed by: OBSTETRICS & GYNECOLOGY

## 2023-08-17 PROCEDURE — 0503F PR POSTPARTUM CARE VISIT: ICD-10-PCS | Mod: CPTII,S$GLB,, | Performed by: OBSTETRICS & GYNECOLOGY

## 2023-08-17 PROCEDURE — 99999 PR PBB SHADOW E&M-EST. PATIENT-LVL III: ICD-10-PCS | Mod: PBBFAC,,, | Performed by: OBSTETRICS & GYNECOLOGY

## 2023-08-17 NOTE — PROGRESS NOTES
"37 y.o. female for postpartum visit.  Patient has no complaints.  Her delivery records were reviewed.  She is breast feeding infant sometimes throughout the day; mostly using formula. She declines contraception.    Exam:  General - well appearing, no apparent distress  Vitals:    08/17/23 1432   BP: 98/64   Weight: 64 kg (141 lb 1.5 oz)   Height: 5' 7" (1.702 m)   PainSc: 0-No pain     Abdomen - soft, non tender, non distended   Incision - none.  Pelvic - normal external genitalia, any lacerations well healed               uterus non tender, appropriately sized  Extremeties - no edema    PP depression score: 2    A: encounter for postpartum assessment    P:  return when due for annual  - cleared for normal activity      "

## 2023-10-11 ENCOUNTER — TELEPHONE (OUTPATIENT)
Dept: OBSTETRICS AND GYNECOLOGY | Facility: CLINIC | Age: 38
End: 2023-10-11
Payer: COMMERCIAL

## 2023-10-12 NOTE — TELEPHONE ENCOUNTER
First period PP about 1 month ago.  Started 2nd period this week.  Yesterday bleeding became heavier throughout the day.  Saturating pads frequently about every 1-2.5 hours.  As of this morning bleeding has slowed a little.  Not on birth control.  Reassured her periods can be heavier initially PP but should regulate over the next several months.  Bleeding precautions discussed.      Still has sensitivity in perineum.  [place] can be uncomfortable.  Suggested an appt to make sure its healing properly, may need estradiol cream to help improve the sensitivity.  Moved up annual exam.

## 2023-10-26 ENCOUNTER — OFFICE VISIT (OUTPATIENT)
Dept: OBSTETRICS AND GYNECOLOGY | Facility: CLINIC | Age: 38
End: 2023-10-26
Payer: COMMERCIAL

## 2023-10-26 VITALS
HEIGHT: 67 IN | SYSTOLIC BLOOD PRESSURE: 100 MMHG | DIASTOLIC BLOOD PRESSURE: 66 MMHG | BODY MASS INDEX: 21.25 KG/M2 | WEIGHT: 135.38 LBS

## 2023-10-26 DIAGNOSIS — Z12.31 BREAST CANCER SCREENING BY MAMMOGRAM: ICD-10-CM

## 2023-10-26 DIAGNOSIS — Z12.4 ENCOUNTER FOR PAPANICOLAOU SMEAR FOR CERVICAL CANCER SCREENING: ICD-10-CM

## 2023-10-26 DIAGNOSIS — Z01.419 ENCOUNTER FOR GYNECOLOGICAL EXAMINATION: Primary | ICD-10-CM

## 2023-10-26 DIAGNOSIS — Z11.51 ENCOUNTER FOR SCREENING FOR HUMAN PAPILLOMAVIRUS (HPV): ICD-10-CM

## 2023-10-26 PROCEDURE — 3078F PR MOST RECENT DIASTOLIC BLOOD PRESSURE < 80 MM HG: ICD-10-PCS | Mod: CPTII,S$GLB,, | Performed by: OBSTETRICS & GYNECOLOGY

## 2023-10-26 PROCEDURE — 87624 HPV HI-RISK TYP POOLED RSLT: CPT | Performed by: OBSTETRICS & GYNECOLOGY

## 2023-10-26 PROCEDURE — 3074F SYST BP LT 130 MM HG: CPT | Mod: CPTII,S$GLB,, | Performed by: OBSTETRICS & GYNECOLOGY

## 2023-10-26 PROCEDURE — 3008F BODY MASS INDEX DOCD: CPT | Mod: CPTII,S$GLB,, | Performed by: OBSTETRICS & GYNECOLOGY

## 2023-10-26 PROCEDURE — 3074F PR MOST RECENT SYSTOLIC BLOOD PRESSURE < 130 MM HG: ICD-10-PCS | Mod: CPTII,S$GLB,, | Performed by: OBSTETRICS & GYNECOLOGY

## 2023-10-26 PROCEDURE — 1159F MED LIST DOCD IN RCRD: CPT | Mod: CPTII,S$GLB,, | Performed by: OBSTETRICS & GYNECOLOGY

## 2023-10-26 PROCEDURE — 3078F DIAST BP <80 MM HG: CPT | Mod: CPTII,S$GLB,, | Performed by: OBSTETRICS & GYNECOLOGY

## 2023-10-26 PROCEDURE — 99999 PR PBB SHADOW E&M-EST. PATIENT-LVL III: ICD-10-PCS | Mod: PBBFAC,,, | Performed by: OBSTETRICS & GYNECOLOGY

## 2023-10-26 PROCEDURE — 99999 PR PBB SHADOW E&M-EST. PATIENT-LVL III: CPT | Mod: PBBFAC,,, | Performed by: OBSTETRICS & GYNECOLOGY

## 2023-10-26 PROCEDURE — 88175 CYTOPATH C/V AUTO FLUID REDO: CPT | Performed by: OBSTETRICS & GYNECOLOGY

## 2023-10-26 PROCEDURE — 99396 PREV VISIT EST AGE 40-64: CPT | Mod: S$GLB,,, | Performed by: OBSTETRICS & GYNECOLOGY

## 2023-10-26 PROCEDURE — 3008F PR BODY MASS INDEX (BMI) DOCUMENTED: ICD-10-PCS | Mod: CPTII,S$GLB,, | Performed by: OBSTETRICS & GYNECOLOGY

## 2023-10-26 PROCEDURE — 1159F PR MEDICATION LIST DOCUMENTED IN MEDICAL RECORD: ICD-10-PCS | Mod: CPTII,S$GLB,, | Performed by: OBSTETRICS & GYNECOLOGY

## 2023-10-26 PROCEDURE — 99396 PR PREVENTIVE VISIT,EST,40-64: ICD-10-PCS | Mod: S$GLB,,, | Performed by: OBSTETRICS & GYNECOLOGY

## 2023-10-26 NOTE — PROGRESS NOTES
"Subjective:       Patient ID: Nini Carter is a 38 y.o. female.    Chief Complaint:  Annual Exam (Encounter for gynecological examination with well woman)      History of Present Illness  - here for annual. Has had two periods; second was heavy.  - has some sensitivity at perineum (kind of like the sensitivity in her chin scar).    History reviewed. No pertinent past medical history.    Past Surgical History:   Procedure Laterality Date    WISDOM TOOTH EXTRACTION          Family History   Problem Relation Age of Onset    Breast cancer Paternal Grandmother     Cancer Paternal Grandmother     Ovarian cancer Maternal Grandmother     Cancer Maternal Grandmother     Breast cancer Maternal Aunt     Cancer Maternal Aunt     Colon cancer Neg Hx         Social History     Socioeconomic History    Marital status:    Tobacco Use    Smoking status: Never    Smokeless tobacco: Never   Substance and Sexual Activity    Alcohol use: Yes    Drug use: No    Sexual activity: Yes     Birth control/protection: None     Comment: Engaged:  wedding 11-           Objective:     Vitals:    10/26/23 1457   BP: 100/66   Weight: 61.4 kg (135 lb 5.8 oz)   Height: 5' 7" (1.702 m)       Physical Exam:   Constitutional: She is oriented to person, place, and time. She appears well-developed and well-nourished.        Pulmonary/Chest: Right breast exhibits no mass, no nipple discharge, no skin change, no tenderness and no swelling. Left breast exhibits no mass, no nipple discharge, no skin change, no tenderness and no swelling. Breasts are symmetrical.        Abdominal: Soft. She exhibits no distension. There is no abdominal tenderness.     Genitourinary:    Vagina and uterus normal.   There is no tenderness or lesion on the right labia. There is no tenderness or lesion on the left labia. Cervix is normal. Right adnexum displays no mass, no tenderness and no fullness. Left adnexum displays no mass, no tenderness and no fullness. " No  no vaginal discharge in the vagina.    pap smear completed          Musculoskeletal: Moves all extremeties.       Neurological: She is alert and oriented to person, place, and time.     Psychiatric: She has a normal mood and affect.        Assessment/ Plan:     Orders Placed This Encounter    HPV High Risk Genotypes, PCR    Mammo Digital Screening Bilat w/ Terrence    Liquid-Based Pap Smear, Screening       Nini was seen today for annual exam.    Diagnoses and all orders for this visit:    Encounter for gynecological examination    Encounter for Papanicolaou smear for cervical cancer screening  -     Liquid-Based Pap Smear, Screening    Encounter for screening for human papillomavirus (HPV)  -     HPV High Risk Genotypes, PCR    Breast cancer screening by mammogram  -     Mammo Digital Screening Bilat w/ Terrence; Future    - reassured that all looks very well healed. May take time to continue to feel more comfortable. She'll call with any issues.    Follow up in about 1 year (around 10/26/2024) for annual exam.    As of April 1, 2021, the Cures Act has been passed nationally. This new law requires that all doctors progress notes, lab results, pathology reports and radiology reports be released IMMEDIATELY to the patient in the patient portal. That means that the results are released to you at the EXACT same time they are released to me. Therefore, with all of the patients that I have I am not able to reply to each patient exactly when the results come in. So there will be a delay from when you see the results to when I see them and have time to come up with a response to send you. Also I only see these results when I am on the computer at work. So if the results come in over the weekend or after 5 pm of a work day, I will not see them until the next business day. As you can tell, this is a challenge as a physician to give every patient the quick response they hope for and deserve. So please be patient!   Thanks  for your understanding and patience.

## 2023-11-01 LAB
FINAL PATHOLOGIC DIAGNOSIS: NORMAL
Lab: NORMAL

## 2024-04-30 ENCOUNTER — HOSPITAL ENCOUNTER (EMERGENCY)
Facility: HOSPITAL | Age: 39
Discharge: HOME | End: 2024-04-30
Attending: EMERGENCY MEDICINE
Payer: COMMERCIAL

## 2024-04-30 DIAGNOSIS — M25.532 LEFT WRIST PAIN: ICD-10-CM

## 2024-04-30 DIAGNOSIS — S52.502A CLOSED FRACTURE OF DISTAL END OF LEFT RADIUS, UNSPECIFIED FRACTURE MORPHOLOGY, INITIAL ENCOUNTER: Primary | ICD-10-CM

## 2024-04-30 PROCEDURE — 25000003 PHARM REV CODE 250: Performed by: PHYSICIAN ASSISTANT

## 2024-04-30 PROCEDURE — 99283 EMERGENCY DEPT VISIT LOW MDM: CPT | Mod: 25

## 2024-04-30 PROCEDURE — 29105 APPLICATION LONG ARM SPLINT: CPT | Mod: RT

## 2024-04-30 RX ORDER — IBUPROFEN 400 MG/1
800 TABLET ORAL
Status: COMPLETED | OUTPATIENT
Start: 2024-04-30 | End: 2024-04-30

## 2024-04-30 RX ORDER — IBUPROFEN 800 MG/1
800 TABLET ORAL 3 TIMES DAILY
Qty: 15 TABLET | Refills: 0 | Status: SHIPPED | OUTPATIENT
Start: 2024-04-30 | End: 2024-05-02

## 2024-04-30 RX ADMIN — IBUPROFEN 800 MG: 400 TABLET, FILM COATED ORAL at 10:04

## 2024-05-01 VITALS
BODY MASS INDEX: 20.05 KG/M2 | RESPIRATION RATE: 18 BRPM | SYSTOLIC BLOOD PRESSURE: 115 MMHG | WEIGHT: 128 LBS | DIASTOLIC BLOOD PRESSURE: 62 MMHG | OXYGEN SATURATION: 98 % | HEART RATE: 74 BPM | TEMPERATURE: 98 F

## 2024-05-01 NOTE — ED PROVIDER NOTES
Encounter Date: 4/30/2024       History     Chief Complaint   Patient presents with    Wrist Injury     Fell at [employer] and landed on L wrist. +obvious deformity noted. +2 radial pulse to LUE. Sensation intact.      38-year-old female presents with isolated injury to the left wrist after mechanical slip and fall this evening.  Patient fell while skating.  No medication taken prior to arrival.  No head injury or LOC.      Review of patient's allergies indicates:   Allergen Reactions    Hydrocodone Itching    Codeine Nausea And Vomiting and Anxiety     No past medical history on file.  Past Surgical History:   Procedure Laterality Date    WISDOM TOOTH EXTRACTION       Family History   Problem Relation Name Age of Onset    Breast cancer Paternal Grandmother      Cancer Paternal Grandmother      Ovarian cancer Maternal Grandmother      Cancer Maternal Grandmother      Breast cancer Maternal Aunt Theresa Mendelson     Cancer Maternal Aunt Theresa Mendelson     Colon cancer Neg Hx       Social History     Tobacco Use    Smoking status: Never    Smokeless tobacco: Never   Substance Use Topics    Alcohol use: Yes    Drug use: No     Review of Systems   Constitutional:  Negative for fever.   HENT:  Negative for sore throat.    Respiratory:  Negative for shortness of breath.    Cardiovascular:  Negative for chest pain.   Gastrointestinal:  Negative for nausea.   Genitourinary:  Negative for dysuria.   Musculoskeletal:  Positive for arthralgias. Negative for back pain.   Skin:  Negative for rash.   Neurological:  Negative for weakness.   Hematological:  Does not bruise/bleed easily.       Physical Exam     Initial Vitals   BP Pulse Resp Temp SpO2   04/30/24 1958 04/30/24 2003 04/30/24 1958 04/30/24 1958 04/30/24 1958   102/60 76 16 98.6 °F (37 °C) 100 %      MAP       --                Physical Exam    Constitutional: Vital signs are normal. She appears well-developed and well-nourished.   HENT:   Head: Normocephalic and  atraumatic.   Right Ear: Hearing normal.   Left Ear: Hearing normal.   Eyes: Conjunctivae are normal.   Cardiovascular:  Normal rate and regular rhythm.           Abdominal: Abdomen is soft. Bowel sounds are normal.   Musculoskeletal:         General: Normal range of motion.      Comments: Deformity noted to the left wrist with swelling.  Palpable pulses in the wrist.  No angulation of the fingers or the hand.  Range of motion of the wrist limited due to pain.  Unable to supinate and pronate due to pain  Sensation is intact  No open injury  Palpable pulses normal      Neurological: She is alert and oriented to person, place, and time.   Skin: Skin is warm and intact.   Psychiatric: She has a normal mood and affect. Her speech is normal and behavior is normal. Cognition and memory are normal.         ED Course   Orthopedic Injury    Date/Time: 4/30/2024 11:43 PM    Performed by: Nigel Nair PA-C  Authorized by: Es Yi MD    Location procedure was performed:  [facility] EMERGENCY DEPARTMENT  Consent Done?:  Not Needed  Injury:     Injury location:  Wrist    Location details:  Right wrist    Injury type:  Fracture    Fracture type: distal radius      Fracture type: distal radius        Pre-procedure assessment:     Neurovascular status: Neurovascularly intact      Distal perfusion: normal      Neurological function: normal      Range of motion: reduced        Selections made in this section will also lock the Injury type section above.:     Immobilization:  Splint    Splint type:  Sugar tong    Supplies used:  Plaster  Post-procedure assessment:     Neurovascular status: Neurovascularly intact      Distal perfusion: normal      Neurological function: normal      Range of motion: splinted      Patient tolerance:  Patient tolerated the procedure well with no immediate complications    Labs Reviewed - No data to display       Imaging Results              X-Ray Hand 3 view Left (Final result)  Result time  04/30/24 22:44:42      Final result by Tim Krishnan MD (04/30/24 22:44:42)                   Impression:      Acute comminuted impacted distal radius fracture.      Electronically signed by: Tim Krishnan MD  Date:    04/30/2024  Time:    22:44               Narrative:    EXAMINATION:  XR WRIST COMPLETE 3 VIEWS LEFT; XR HAND COMPLETE 3 VIEW LEFT; XR FOREARM LEFT    CLINICAL HISTORY:  left wrist pain; Pain in left wrist    TECHNIQUE:  PA, lateral, and oblique views of the left wrist were performed.  Left forearm AP and lateral.  Left hand three views.    COMPARISON:  None    FINDINGS:  Acute displaced, comminuted and mild impacted fracture is seen of the distal radius.  There is mild palmar angulation seen of the main distal radius fracture components.  Associated soft tissue swelling is seen at the distal aspect of the forearm and wrist.  No additional acute displaced fracture or dislocation seen.                                       X-Ray Wrist Complete Left (Final result)  Result time 04/30/24 22:44:42      Final result by Tim Krishnan MD (04/30/24 22:44:42)                   Impression:      Acute comminuted impacted distal radius fracture.      Electronically signed by: Tim Krishnan MD  Date:    04/30/2024  Time:    22:44               Narrative:    EXAMINATION:  XR WRIST COMPLETE 3 VIEWS LEFT; XR HAND COMPLETE 3 VIEW LEFT; XR FOREARM LEFT    CLINICAL HISTORY:  left wrist pain; Pain in left wrist    TECHNIQUE:  PA, lateral, and oblique views of the left wrist were performed.  Left forearm AP and lateral.  Left hand three views.    COMPARISON:  None    FINDINGS:  Acute displaced, comminuted and mild impacted fracture is seen of the distal radius.  There is mild palmar angulation seen of the main distal radius fracture components.  Associated soft tissue swelling is seen at the distal aspect of the forearm and wrist.  No additional acute displaced fracture or dislocation seen.                                        X-Ray Forearm Left (Final result)  Result time 04/30/24 22:44:42      Final result by Tim Krishnan MD (04/30/24 22:44:42)                   Impression:      Acute comminuted impacted distal radius fracture.      Electronically signed by: Tim Krishnan MD  Date:    04/30/2024  Time:    22:44               Narrative:    EXAMINATION:  XR WRIST COMPLETE 3 VIEWS LEFT; XR HAND COMPLETE 3 VIEW LEFT; XR FOREARM LEFT    CLINICAL HISTORY:  left wrist pain; Pain in left wrist    TECHNIQUE:  PA, lateral, and oblique views of the left wrist were performed.  Left forearm AP and lateral.  Left hand three views.    COMPARISON:  None    FINDINGS:  Acute displaced, comminuted and mild impacted fracture is seen of the distal radius.  There is mild palmar angulation seen of the main distal radius fracture components.  Associated soft tissue swelling is seen at the distal aspect of the forearm and wrist.  No additional acute displaced fracture or dislocation seen.                                       Medications   ibuprofen tablet 800 mg (800 mg Oral Given 4/30/24 2245)     Medical Decision Making  38-year-old female with left wrist injury   Differential:  Fracture, contusion, dislocation, sprain   My independent interpretation of the x-ray reveals a distal radius fracture that is comminuted.  Management, patient placed in a sugar-tong splint.  Referred to Orthopedic surgery, follow-up instructions given.  Patient declined wanting any pain medication.    Amount and/or Complexity of Data Reviewed  Radiology: ordered and independent interpretation performed.    Risk  Prescription drug management.                                      Clinical Impression:  Final diagnoses:  [M25.532] Left wrist pain  [S52.502A] Closed fracture of distal end of left radius, unspecified fracture morphology, initial encounter (Primary)          ED Disposition Condition    Discharge Stable          ED Prescriptions       Medication  Sig Dispense Start Date End Date Auth. Provider    ibuprofen (ADVIL,MOTRIN) 800 MG tablet Take 1 tablet (800 mg total) by mouth 3 (three) times daily. for 5 days 15 tablet 4/30/2024 5/5/2024 Nigel Nair PA-C          Follow-up Information       Follow up With Specialties Details Why Contact Info Additional Information    Elvis Hughes MD Hand Surgery, Orthopedic Surgery Call   [address] [facility] [id number]  [phone]       [facility]   [address]  [facility] [address] [phone] [facility] - Main Building, 5th Floor Please park in [facility] and take Atrium elevator             Nigel Nair PA-C  04/30/24 9260       Nigel Nair PA-C  04/30/24 8057

## 2024-05-01 NOTE — ED NOTES
Pt to ED for left wrist pain. Pt was skating and the floor was slippery and she fell onto her left wrist. Obvious deformity. Ice pack in place. Pt declines needing pain meds at this time.     LOC: The patient is awake, alert and aware of environment with an appropriate affect, the patient is oriented x 3 and speaking appropriately.  APPEARANCE: Patient resting comfortably and in no acute distress, patient is clean and well groomed, patient's clothing is properly fastened.  SKIN: The skin is warm and dry, color consistent with ethnicity, patient has normal skin turgor and moist mucus membranes, skin intact, no breakdown or bruising noted.  MUSCULOSKELETAL: Patient moving all extremities spontaneously, deformity noted to left wrist.   RESPIRATORY: Airway is open and patent, respirations are spontaneous, patient has a normal effort and rate, no accessory muscle use noted.  CARDIAC: Patient has a normal rate and regular rhythm, no periphreal edema noted, capillary refill < 3 seconds.  ABDOMEN: Soft and non tender to palpation, no distention noted, normoactive bowel sounds present in all four quadrants.  NEUROLOGIC:  facial expression is symmetrical, patient moving all extremities spontaneously, normal sensation in all extremities when touched with a finger.  Follows all commands appropriately.

## 2024-05-01 NOTE — DISCHARGE INSTRUCTIONS
Follow-up with the orthopedic surgery team   Return to the ED as needed   Do not get your splint wet     Thank you for coming to our Emergency Department today. It is important to remember that some problems are difficult to diagnose and may not be found during your Emergency Department visit. Be sure to follow up with your primary care doctor and review all labs/imaging/tests that were performed during this visit with them. Some labs/tests may be outside of the normal range and require non-emergent follow-up and further investigation to help diagnose/exclude/prevent complications or other medical conditions.    If you do not have a primary care doctor, you may contact the one listed on your discharge paperwork or you may also call the [facility] Appointment Desk at [phone] to schedule an appointment and establish care with one. It is important to your health that you have a primary care doctor.    Please take all medications as directed. All medications may potentially have side-effects and it is impossible to predict which medications may give you side-effects or what side-effects (if any) they will give you.. If you feel that you are having a negative effect or side-effect of any medication you should immediately stop taking them and seek medical attention. If you feel that you are having a life-threatening reaction call 911.    Return to the ER with any questions/concerns, new/concerning symptoms, worsening or failure to improve.     Do not drive, swim, climb to height, take a bath or make any important decisions for 24 hours if you have received any pain medications, sedatives or mood altering drugs during your ER visit.

## 2024-05-02 ENCOUNTER — OFFICE VISIT (OUTPATIENT)
Dept: ORTHOPEDICS | Facility: CLINIC | Age: 39
End: 2024-05-02
Payer: COMMERCIAL

## 2024-05-02 ENCOUNTER — ANESTHESIA EVENT (OUTPATIENT)
Dept: SURGERY | Facility: HOSPITAL | Age: 39
End: 2024-05-02
Payer: COMMERCIAL

## 2024-05-02 ENCOUNTER — PATIENT MESSAGE (OUTPATIENT)
Dept: ORTHOPEDICS | Facility: CLINIC | Age: 39
End: 2024-05-02

## 2024-05-02 DIAGNOSIS — S52.572A OTHER CLOSED INTRA-ARTICULAR FRACTURE OF DISTAL END OF LEFT RADIUS, INITIAL ENCOUNTER: Primary | ICD-10-CM

## 2024-05-02 DIAGNOSIS — S52.502A CLOSED FRACTURE OF DISTAL END OF LEFT RADIUS, UNSPECIFIED FRACTURE MORPHOLOGY, INITIAL ENCOUNTER: ICD-10-CM

## 2024-05-02 PROCEDURE — 99204 OFFICE O/P NEW MOD 45 MIN: CPT | Mod: 57,25,S$GLB, | Performed by: PHYSICIAN ASSISTANT

## 2024-05-02 PROCEDURE — 1159F MED LIST DOCD IN RCRD: CPT | Mod: CPTII,S$GLB,, | Performed by: PHYSICIAN ASSISTANT

## 2024-05-02 PROCEDURE — 99999 PR PBB SHADOW E&M-EST. PATIENT-LVL III: CPT | Mod: PBBFAC,,, | Performed by: PHYSICIAN ASSISTANT

## 2024-05-02 PROCEDURE — 29125 APPL SHORT ARM SPLINT STATIC: CPT | Mod: LT,S$GLB,, | Performed by: PHYSICIAN ASSISTANT

## 2024-05-02 RX ORDER — CEFAZOLIN SODIUM 2 G/50ML
2 SOLUTION INTRAVENOUS
Status: CANCELLED | OUTPATIENT
Start: 2024-05-02

## 2024-05-02 RX ORDER — OXYCODONE AND ACETAMINOPHEN 5; 325 MG/1; MG/1
1 TABLET ORAL EVERY 6 HOURS PRN
Qty: 10 TABLET | Refills: 0 | Status: SHIPPED | OUTPATIENT
Start: 2024-05-02

## 2024-05-02 RX ORDER — IBUPROFEN 600 MG/1
600 TABLET ORAL 3 TIMES DAILY
Qty: 20 TABLET | Refills: 0 | Status: SHIPPED | OUTPATIENT
Start: 2024-05-02

## 2024-05-02 RX ORDER — ACETAMINOPHEN 500 MG
1000 TABLET ORAL 2 TIMES DAILY
Qty: 20 TABLET | Refills: 0 | Status: SHIPPED | OUTPATIENT
Start: 2024-05-02

## 2024-05-02 RX ORDER — MUPIROCIN 20 MG/G
OINTMENT TOPICAL
Status: CANCELLED | OUTPATIENT
Start: 2024-05-02

## 2024-05-02 NOTE — H&P (VIEW-ONLY)
[facility]  History & Physical  Orthopedics    SUBJECTIVE:      Chief Complaint: Left wrist injury    Referring Provider: Nigel Nair PA-C Dr. Dunbar is the supervising physician for this encounter/patient    History of Present Illness:  Patient is a 38 y.o. right hand dominant female who presents today with complaints of left wrist injury occurred on 4/30/24 while roller skating. She was seen that night in the [facility] ED. Diagnosed with distal radius fracture, ortho was not consulted for reduction. She was placed in sugartong splint. She is doing well with the wrist immobilized, denies tingling into the hands. No prior hand surgery.     Onset of symptoms/DOI was 4/30/24.    Symptoms are aggravated by activity and movement.    Symptoms are alleviated by rest and immobilization.    Symptoms consist of pain, swelling, deformity, and decreased ROM.    The patient rates their pain as a 4/10.    Attempted treatment(s) and/or interventions include activity modifications, rest, splinting/casting.     The patient denies any fevers, chills, N/V, D/C and presents for evaluation.       No past medical history on file.  Past Surgical History:   Procedure Laterality Date    WISDOM TOOTH EXTRACTION       Review of patient's allergies indicates:   Allergen Reactions    Hydrocodone Itching    Codeine Nausea And Vomiting and Anxiety     Social History     Social History Narrative    Not on file     Family History   Problem Relation Name Age of Onset    Breast cancer Paternal Grandmother      Cancer Paternal Grandmother      Ovarian cancer Maternal Grandmother      Cancer Maternal Grandmother      Breast cancer Maternal Aunt Rhona Mendelson     Cancer Maternal Aunt Theresa Mendelson     Colon cancer Neg Hx           Current Outpatient Medications:     ibuprofen (ADVIL,MOTRIN) 800 MG tablet, Take 1 tablet (800 mg total) by mouth 3 (three) times daily. for 5 days, Disp: 15 tablet, Rfl: 0     PRENATAL VIT 10-IRON-FOLIC-DHA ORAL, Take by mouth., Disp: , Rfl:       Review of Systems:  Constitutional: no fever or chills  Eyes: no visual changes  ENT: no nasal congestion or sore throat  Respiratory: no cough or shortness of breath  Cardiovascular: no chest pain  Gastrointestinal: no nausea or vomiting, tolerating diet  Musculoskeletal: pain, soreness, and decreased ROM    OBJECTIVE:      Vital Signs (Most Recent):  There were no vitals filed for this visit.  There is no height or weight on file to calculate BMI.      Physical Exam:  Constitutional: The patient appears well-developed and well-nourished. No distress.   Skin: No lesions appreciated  Head: Normocephalic and atraumatic.   Nose: Nose normal.   Ears: No deformities seen  Eyes: Conjunctivae and EOM are normal.   Neck: No tracheal deviation present.   Cardiovascular: Normal rate and intact distal pulses.    Pulmonary/Chest: Effort normal. No respiratory distress.   Abdominal: There is no guarding.   Neurological: The patient is alert.   Psychiatric: The patient has a normal mood and affect.     Left Hand/Wrist Examination:    Observation/Inspection:  Swelling  none    Deformity  Dorsal wrist  Discoloration  none     Scars   none    Atrophy  none    HAND/WRIST EXAMINATION:  Patient is vasovagal during the discussion today. Edema present in the hand with deformity to dorsal wrist.    Neurovascular Exam:  Digits WWP, brisk CR < 3s throughout  NVI motor/LTS to M/R/U nerves, radial pulse 2+    Abdomen not guarded  Respirations nonlabored  Perfusion intact    Diagnostic Results:     Imaging - I independently viewed the patient's imaging as well as the radiology report.      FINDINGS:  Acute displaced, comminuted and mild impacted fracture is seen of the distal radius.  There is mild palmar angulation seen of the main distal radius fracture components.  Associated soft tissue swelling is seen at the distal aspect of the forearm and wrist.  No additional  acute displaced fracture or dislocation seen.     Impression:     Acute comminuted impacted distal radius fracture.    ASSESSMENT/PLAN:      38 y.o. yo female with Left comminuted distal radius fracture, impacted and dorsal angulation    Plan: The patient and I had a thorough discussion today.  We discussed the working diagnosis as well as several other potential alternative diagnoses.  Treatment options were discussed, both conservative and surgical, including risks/benefits of both. Surgery is highly recommended for this, which she does agree to. She is placed in volar slab splint today.    She is consented for Left ORIF distal radius fracture and any indicated procedures with Dr. Hughes on 5/3/24. Case ordered for [place].    The patient has not responded to adequate non operative treatment at this time and/or non operative treatment is not indicated. Thus, the risks, benefits and alternatives to surgery were discussed with the patient in detail.  Specific risks include but are not limited to bleeding, infection, vessel and/or nerve damage, pain, numbness, tingling, compartment syndrome, need for additional surgery, failure to return to pre-injury and/or preoperative functional status, inability to return to work, scar sensitivity, delayed healing, complex regional pain syndrome, weakness, pulley injury, tendon injury, bowstringing, partial and/or incomplete relief of symptoms, persistence of and/or worsening of symptoms, hardware and/or surgical failure, prominent and/or symptomatic hardware possibly necessitating future removal, osteomyelitis, amputation, loss of function, stiffness, rotational malalignment, functional debility, dysfunction, decreased  strength, need for prolonged postoperative rehabilitation, malunion, nonunion, deep venous thrombosis, pulmonary embolism, arthritis and death.  The patient states an understanding and wishes to proceed with surgery.   All questions were answered.  No  guarantees were implied or stated.  Written informed consent was obtained.      Should the patient's symptoms worsen, persist, or fail to improve they should return for reevaluation and I would be happy to see them back anytime.           Please do not hesitate to reach out to us via email, phone, or MyChart with any questions, concerns, or feedback.

## 2024-05-02 NOTE — PROGRESS NOTES
LT plaster volar splint application ordered by PA-C [place]. Skin intact with no redness or bruising.

## 2024-05-02 NOTE — PROGRESS NOTES
[facility]  History & Physical  Orthopedics    SUBJECTIVE:      Chief Complaint: Left wrist injury    Referring Provider: Nigel Nair PA-C Dr. Dunbar is the supervising physician for this encounter/patient    History of Present Illness:  Patient is a 38 y.o. right hand dominant female who presents today with complaints of left wrist injury occurred on 4/30/24 while roller skating. She was seen that night in the [facility] ED. Diagnosed with distal radius fracture, ortho was not consulted for reduction. She was placed in sugartong splint. She is doing well with the wrist immobilized, denies tingling into the hands. No prior hand surgery.     Onset of symptoms/DOI was 4/30/24.    Symptoms are aggravated by activity and movement.    Symptoms are alleviated by rest and immobilization.    Symptoms consist of pain, swelling, deformity, and decreased ROM.    The patient rates their pain as a 4/10.    Attempted treatment(s) and/or interventions include activity modifications, rest, splinting/casting.     The patient denies any fevers, chills, N/V, D/C and presents for evaluation.       No past medical history on file.  Past Surgical History:   Procedure Laterality Date    WISDOM TOOTH EXTRACTION       Review of patient's allergies indicates:   Allergen Reactions    Hydrocodone Itching    Codeine Nausea And Vomiting and Anxiety     Social History     Social History Narrative    Not on file     Family History   Problem Relation Name Age of Onset    Breast cancer Paternal Grandmother      Cancer Paternal Grandmother      Ovarian cancer Maternal Grandmother      Cancer Maternal Grandmother      Breast cancer Maternal Aunt Rhona Mendelson     Cancer Maternal Aunt Theresa Mendelson     Colon cancer Neg Hx           Current Outpatient Medications:     ibuprofen (ADVIL,MOTRIN) 800 MG tablet, Take 1 tablet (800 mg total) by mouth 3 (three) times daily. for 5 days, Disp: 15 tablet, Rfl: 0     PRENATAL VIT 10-IRON-FOLIC-DHA ORAL, Take by mouth., Disp: , Rfl:       Review of Systems:  Constitutional: no fever or chills  Eyes: no visual changes  ENT: no nasal congestion or sore throat  Respiratory: no cough or shortness of breath  Cardiovascular: no chest pain  Gastrointestinal: no nausea or vomiting, tolerating diet  Musculoskeletal: pain, soreness, and decreased ROM    OBJECTIVE:      Vital Signs (Most Recent):  There were no vitals filed for this visit.  There is no height or weight on file to calculate BMI.      Physical Exam:  Constitutional: The patient appears well-developed and well-nourished. No distress.   Skin: No lesions appreciated  Head: Normocephalic and atraumatic.   Nose: Nose normal.   Ears: No deformities seen  Eyes: Conjunctivae and EOM are normal.   Neck: No tracheal deviation present.   Cardiovascular: Normal rate and intact distal pulses.    Pulmonary/Chest: Effort normal. No respiratory distress.   Abdominal: There is no guarding.   Neurological: The patient is alert.   Psychiatric: The patient has a normal mood and affect.     Left Hand/Wrist Examination:    Observation/Inspection:  Swelling  none    Deformity  Dorsal wrist  Discoloration  none     Scars   none    Atrophy  none    HAND/WRIST EXAMINATION:  Patient is vasovagal during the discussion today. Edema present in the hand with deformity to dorsal wrist.    Neurovascular Exam:  Digits WWP, brisk CR < 3s throughout  NVI motor/LTS to M/R/U nerves, radial pulse 2+    Abdomen not guarded  Respirations nonlabored  Perfusion intact    Diagnostic Results:     Imaging - I independently viewed the patient's imaging as well as the radiology report.      FINDINGS:  Acute displaced, comminuted and mild impacted fracture is seen of the distal radius.  There is mild palmar angulation seen of the main distal radius fracture components.  Associated soft tissue swelling is seen at the distal aspect of the forearm and wrist.  No additional  acute displaced fracture or dislocation seen.     Impression:     Acute comminuted impacted distal radius fracture.    ASSESSMENT/PLAN:      38 y.o. yo female with Left comminuted distal radius fracture, impacted and dorsal angulation    Plan: The patient and I had a thorough discussion today.  We discussed the working diagnosis as well as several other potential alternative diagnoses.  Treatment options were discussed, both conservative and surgical, including risks/benefits of both. Surgery is highly recommended for this, which she does agree to. She is placed in volar slab splint today.    She is consented for Left ORIF distal radius fracture and any indicated procedures with Dr. Hughes on 5/3/24. Case ordered for [place].    The patient has not responded to adequate non operative treatment at this time and/or non operative treatment is not indicated. Thus, the risks, benefits and alternatives to surgery were discussed with the patient in detail.  Specific risks include but are not limited to bleeding, infection, vessel and/or nerve damage, pain, numbness, tingling, compartment syndrome, need for additional surgery, failure to return to pre-injury and/or preoperative functional status, inability to return to work, scar sensitivity, delayed healing, complex regional pain syndrome, weakness, pulley injury, tendon injury, bowstringing, partial and/or incomplete relief of symptoms, persistence of and/or worsening of symptoms, hardware and/or surgical failure, prominent and/or symptomatic hardware possibly necessitating future removal, osteomyelitis, amputation, loss of function, stiffness, rotational malalignment, functional debility, dysfunction, decreased  strength, need for prolonged postoperative rehabilitation, malunion, nonunion, deep venous thrombosis, pulmonary embolism, arthritis and death.  The patient states an understanding and wishes to proceed with surgery.   All questions were answered.  No  guarantees were implied or stated.  Written informed consent was obtained.      Should the patient's symptoms worsen, persist, or fail to improve they should return for reevaluation and I would be happy to see them back anytime.           Please do not hesitate to reach out to us via email, phone, or MyChart with any questions, concerns, or feedback.

## 2024-05-03 ENCOUNTER — ANESTHESIA (OUTPATIENT)
Dept: SURGERY | Facility: HOSPITAL | Age: 39
End: 2024-05-03
Payer: COMMERCIAL

## 2024-05-03 ENCOUNTER — HOSPITAL ENCOUNTER (OUTPATIENT)
Facility: HOSPITAL | Age: 39
Discharge: HOME | End: 2024-05-03
Attending: ORTHOPAEDIC SURGERY | Admitting: ORTHOPAEDIC SURGERY
Payer: COMMERCIAL

## 2024-05-03 VITALS
SYSTOLIC BLOOD PRESSURE: 116 MMHG | TEMPERATURE: 98 F | HEART RATE: 69 BPM | HEIGHT: 67 IN | WEIGHT: 128 LBS | BODY MASS INDEX: 20.09 KG/M2 | RESPIRATION RATE: 27 BRPM | OXYGEN SATURATION: 100 % | DIASTOLIC BLOOD PRESSURE: 75 MMHG

## 2024-05-03 DIAGNOSIS — S52.572A OTHER CLOSED INTRA-ARTICULAR FRACTURE OF DISTAL END OF LEFT RADIUS, INITIAL ENCOUNTER: ICD-10-CM

## 2024-05-03 LAB
B-HCG UR QL: NEGATIVE
CTP QC/QA: YES

## 2024-05-03 PROCEDURE — 99900035 HC TECH TIME PER 15 MIN (STAT)

## 2024-05-03 PROCEDURE — C1713 ANCHOR/SCREW BN/BN,TIS/BN: HCPCS | Performed by: ORTHOPAEDIC SURGERY

## 2024-05-03 PROCEDURE — 63600175 PHARM REV CODE 636 W HCPCS: Performed by: STUDENT IN AN ORGANIZED HEALTH CARE EDUCATION/TRAINING PROGRAM

## 2024-05-03 PROCEDURE — 63600175 PHARM REV CODE 636 W HCPCS: Performed by: PHYSICIAN ASSISTANT

## 2024-05-03 PROCEDURE — C1769 GUIDE WIRE: HCPCS | Performed by: ORTHOPAEDIC SURGERY

## 2024-05-03 PROCEDURE — 25000003 PHARM REV CODE 250: Performed by: NURSE ANESTHETIST, CERTIFIED REGISTERED

## 2024-05-03 PROCEDURE — 94761 N-INVAS EAR/PLS OXIMETRY MLT: CPT

## 2024-05-03 PROCEDURE — 37000009 HC ANESTHESIA EA ADD 15 MINS: Performed by: ORTHOPAEDIC SURGERY

## 2024-05-03 PROCEDURE — 36000711: Performed by: ORTHOPAEDIC SURGERY

## 2024-05-03 PROCEDURE — 27201423 OPTIME MED/SURG SUP & DEVICES STERILE SUPPLY: Performed by: ORTHOPAEDIC SURGERY

## 2024-05-03 PROCEDURE — 71000015 HC POSTOP RECOV 1ST HR: Performed by: ORTHOPAEDIC SURGERY

## 2024-05-03 PROCEDURE — 64415 NJX AA&/STRD BRCH PLXS IMG: CPT | Mod: 59,LT | Performed by: STUDENT IN AN ORGANIZED HEALTH CARE EDUCATION/TRAINING PROGRAM

## 2024-05-03 PROCEDURE — 63600175 PHARM REV CODE 636 W HCPCS: Performed by: NURSE ANESTHETIST, CERTIFIED REGISTERED

## 2024-05-03 PROCEDURE — D9220A PRA ANESTHESIA: Mod: CRNA,,, | Performed by: NURSE ANESTHETIST, CERTIFIED REGISTERED

## 2024-05-03 PROCEDURE — 25000003 PHARM REV CODE 250: Performed by: PHYSICIAN ASSISTANT

## 2024-05-03 PROCEDURE — 81025 URINE PREGNANCY TEST: CPT | Performed by: ORTHOPAEDIC SURGERY

## 2024-05-03 PROCEDURE — D9220A PRA ANESTHESIA: Mod: ANES,,, | Performed by: ANESTHESIOLOGY

## 2024-05-03 PROCEDURE — 25607 OPTX DST RD XARTC FX/EPI SEP: CPT | Mod: LT,,, | Performed by: ORTHOPAEDIC SURGERY

## 2024-05-03 PROCEDURE — 36000710: Performed by: ORTHOPAEDIC SURGERY

## 2024-05-03 PROCEDURE — 37000008 HC ANESTHESIA 1ST 15 MINUTES: Performed by: ORTHOPAEDIC SURGERY

## 2024-05-03 PROCEDURE — 71000033 HC RECOVERY, INTIAL HOUR: Performed by: ORTHOPAEDIC SURGERY

## 2024-05-03 DEVICE — PEG BONE CORTICAL 2.3X18 TI: Type: IMPLANTABLE DEVICE | Site: HAND | Status: FUNCTIONAL

## 2024-05-03 DEVICE — PLATE ACU-LOC 2 VDF LT NARROW: Type: IMPLANTABLE DEVICE | Site: HAND | Status: FUNCTIONAL

## 2024-05-03 DEVICE — SCREW BNE N LOK HEXLB 3.5X12: Type: IMPLANTABLE DEVICE | Site: HAND | Status: FUNCTIONAL

## 2024-05-03 RX ORDER — DEXAMETHASONE SODIUM PHOSPHATE 4 MG/ML
INJECTION, SOLUTION INTRA-ARTICULAR; INTRALESIONAL; INTRAMUSCULAR; INTRAVENOUS; SOFT TISSUE
Status: DISCONTINUED | OUTPATIENT
Start: 2024-05-03 | End: 2024-05-03

## 2024-05-03 RX ORDER — MUPIROCIN 20 MG/G
OINTMENT TOPICAL
Status: DISCONTINUED | OUTPATIENT
Start: 2024-05-03 | End: 2024-05-03 | Stop reason: HOSPADM

## 2024-05-03 RX ORDER — PROPOFOL 10 MG/ML
VIAL (ML) INTRAVENOUS
Status: DISCONTINUED | OUTPATIENT
Start: 2024-05-03 | End: 2024-05-03

## 2024-05-03 RX ORDER — LIDOCAINE HYDROCHLORIDE 20 MG/ML
INJECTION INTRAVENOUS
Status: DISCONTINUED | OUTPATIENT
Start: 2024-05-03 | End: 2024-05-03

## 2024-05-03 RX ORDER — SODIUM CHLORIDE 0.9 % (FLUSH) 0.9 %
3 SYRINGE (ML) INJECTION
Status: DISCONTINUED | OUTPATIENT
Start: 2024-05-03 | End: 2024-05-03 | Stop reason: HOSPADM

## 2024-05-03 RX ORDER — MIDAZOLAM HYDROCHLORIDE 1 MG/ML
1 INJECTION, SOLUTION INTRAMUSCULAR; INTRAVENOUS
Status: DISCONTINUED | OUTPATIENT
Start: 2024-05-03 | End: 2024-05-03 | Stop reason: HOSPADM

## 2024-05-03 RX ORDER — ONDANSETRON HYDROCHLORIDE 2 MG/ML
4 INJECTION, SOLUTION INTRAVENOUS ONCE AS NEEDED
Status: DISCONTINUED | OUTPATIENT
Start: 2024-05-03 | End: 2024-05-03 | Stop reason: HOSPADM

## 2024-05-03 RX ORDER — CELECOXIB 200 MG/1
400 CAPSULE ORAL
Status: DISCONTINUED | OUTPATIENT
Start: 2024-05-03 | End: 2024-05-03 | Stop reason: HOSPADM

## 2024-05-03 RX ORDER — ONDANSETRON HYDROCHLORIDE 2 MG/ML
INJECTION, SOLUTION INTRAVENOUS
Status: DISCONTINUED | OUTPATIENT
Start: 2024-05-03 | End: 2024-05-03

## 2024-05-03 RX ORDER — IBUPROFEN 400 MG/1
400 TABLET ORAL EVERY 4 HOURS
Status: ON HOLD | COMMUNITY
End: 2024-05-03 | Stop reason: HOSPADM

## 2024-05-03 RX ORDER — PROPOFOL 10 MG/ML
VIAL (ML) INTRAVENOUS CONTINUOUS PRN
Status: DISCONTINUED | OUTPATIENT
Start: 2024-05-03 | End: 2024-05-03

## 2024-05-03 RX ORDER — FENTANYL CITRATE 50 UG/ML
INJECTION, SOLUTION INTRAMUSCULAR; INTRAVENOUS
Status: DISCONTINUED
Start: 2024-05-03 | End: 2024-05-03 | Stop reason: WASHOUT

## 2024-05-03 RX ORDER — FENTANYL CITRATE 50 UG/ML
100 INJECTION, SOLUTION INTRAMUSCULAR; INTRAVENOUS
Status: DISCONTINUED | OUTPATIENT
Start: 2024-05-04 | End: 2024-05-03 | Stop reason: HOSPADM

## 2024-05-03 RX ORDER — FENTANYL CITRATE 50 UG/ML
INJECTION, SOLUTION INTRAMUSCULAR; INTRAVENOUS
Status: DISCONTINUED | OUTPATIENT
Start: 2024-05-03 | End: 2024-05-03

## 2024-05-03 RX ORDER — ACETAMINOPHEN 500 MG
1000 TABLET ORAL
Status: DISCONTINUED | OUTPATIENT
Start: 2024-05-03 | End: 2024-05-03 | Stop reason: HOSPADM

## 2024-05-03 RX ORDER — ROPIVACAINE HYDROCHLORIDE 5 MG/ML
INJECTION, SOLUTION EPIDURAL; INFILTRATION; PERINEURAL
Status: COMPLETED | OUTPATIENT
Start: 2024-05-03 | End: 2024-05-03

## 2024-05-03 RX ADMIN — DEXAMETHASONE SODIUM PHOSPHATE 8 MG: 4 INJECTION, SOLUTION INTRAMUSCULAR; INTRAVENOUS at 10:05

## 2024-05-03 RX ADMIN — MIDAZOLAM 1 MG: 1 INJECTION INTRAMUSCULAR; INTRAVENOUS at 09:05

## 2024-05-03 RX ADMIN — ONDANSETRON 4 MG: 2 INJECTION INTRAMUSCULAR; INTRAVENOUS at 10:05

## 2024-05-03 RX ADMIN — CEFAZOLIN 2 G: 2 INJECTION, POWDER, FOR SOLUTION INTRAMUSCULAR; INTRAVENOUS at 10:05

## 2024-05-03 RX ADMIN — LIDOCAINE HYDROCHLORIDE 100 MG: 20 INJECTION INTRAVENOUS at 10:05

## 2024-05-03 RX ADMIN — FENTANYL CITRATE 25 MCG: 50 INJECTION, SOLUTION INTRAMUSCULAR; INTRAVENOUS at 10:05

## 2024-05-03 RX ADMIN — SODIUM CHLORIDE: 9 INJECTION, SOLUTION INTRAVENOUS at 09:05

## 2024-05-03 RX ADMIN — PROPOFOL 75 MCG/KG/MIN: 10 INJECTION, EMULSION INTRAVENOUS at 10:05

## 2024-05-03 RX ADMIN — FENTANYL CITRATE 25 MCG: 50 INJECTION, SOLUTION INTRAMUSCULAR; INTRAVENOUS at 11:05

## 2024-05-03 RX ADMIN — MIDAZOLAM 2 MG: 1 INJECTION INTRAMUSCULAR; INTRAVENOUS at 09:05

## 2024-05-03 RX ADMIN — PROPOFOL 100 MG: 10 INJECTION, EMULSION INTRAVENOUS at 10:05

## 2024-05-03 RX ADMIN — PROPOFOL 50 MG: 10 INJECTION, EMULSION INTRAVENOUS at 10:05

## 2024-05-03 RX ADMIN — ROPIVACAINE HYDROCHLORIDE 20 ML: 5 INJECTION EPIDURAL; INFILTRATION; PERINEURAL at 09:05

## 2024-05-03 NOTE — PLAN OF CARE
Pre op complete. Waiting on anesthesia consent. Pt's  at bedside; belongings to be locked in locker. Pt resting comfortably with all questions addressed at this time and call light in reach.

## 2024-05-03 NOTE — TRANSFER OF CARE
"Anesthesia Transfer of Care Note    Patient: Nini Carter    Procedure(s) Performed: Procedure(s) (LRB):  ORIF, FRACTURE, RADIUS, DISTAL - LEFT poss CTR (Left)    Patient location: PACU    Anesthesia Type: general    Transport from OR: Transported from OR on 6-10 L/min O2 by face mask with adequate spontaneous ventilation    Post pain: adequate analgesia    Post assessment: no apparent anesthetic complications and tolerated procedure well    Post vital signs: stable    Level of consciousness: sedated    Nausea/Vomiting: no nausea/vomiting    Complications: none    Transfer of care protocol was followed      Last vitals: Visit Vitals  /81 (BP Location: Right arm, Patient Position: Lying)   Pulse 80   Temp 36.7 °C (98.1 °F) (Oral)   Resp 14   Ht 5' 7" (1.702 m)   Wt 58.1 kg (128 lb)   LMP 04/29/2024 (Exact Date)   SpO2 98%   Breastfeeding No   BMI 20.05 kg/m²     "

## 2024-05-03 NOTE — ANESTHESIA PREPROCEDURE EVALUATION
05/03/2024  Pre-operative evaluation for Procedure(s) (LRB):  ORIF, FRACTURE, RADIUS, DISTAL - LEFT poss CTR (Left)    Nini Carter is a 38 y.o. female     Patient Active Problem List   Diagnosis    Low ferritin    Polyglandular dysfunction    Vitamin D deficiency    Encounter for induction of labor    Elderly primigravida in third trimester    Carrier of group B Streptococcus       Review of patient's allergies indicates:   Allergen Reactions    Hydrocodone Itching    Codeine Nausea And Vomiting and Anxiety       No current facility-administered medications on file prior to encounter.     No current outpatient medications on file prior to encounter.       Past Surgical History:   Procedure Laterality Date    WISDOM TOOTH EXTRACTION         Social History     Socioeconomic History    Marital status:    Tobacco Use    Smoking status: Never    Smokeless tobacco: Never   Substance and Sexual Activity    Alcohol use: Yes    Drug use: No    Sexual activity: Yes     Birth control/protection: None     Comment: Engaged:  jaziel 11-     Social Determinants of Health     Financial Resource Strain: Low Risk  (8/10/2022)    Received from [facility] [employer] [facility] [employer]    Overall Financial Resource Strain (CARDIA)     Difficulty of Paying Living Expenses: Not very hard   Food Insecurity: No Food Insecurity (8/10/2022)    Received from [employer] [facility]    Hunger Vital Sign     Worried About Running Out of Food in the Last Year: Never true     Ran Out of Food in the Last Year: Never true   Transportation Needs: No Transportation Needs (8/10/2022)    Received from [facility] [employer] [facility]    PRAPARE - Transportation     Lack of Transportation (Medical): No     Lack of Transportation (Non-Medical): No   Physical Activity: Insufficiently Active (8/10/2022)    Received from [facility] [employer]     Exercise Vital Sign     Days of Exercise per Week: 3 days     Minutes of Exercise per Session: 20 min   Stress: No Stress Concern Present (8/10/2022)    Received from [facility]    [employer] - Occupational Stress Questionnaire     Feeling of Stress : Not at all   Housing Stability: Low Risk  (8/10/2022)    [facility]    Housing Stability Vital Sign     Unable to Pay for Housing in the Last Year: No     Number of Places Lived in the Last Year: 1     In the last 12 months, was there a time when you did not have a steady place to sleep or slept in a shelter (including now)?: No     EKG:  None on file    2D Echo:  No results found for this or any previous visit.      Pre-op Assessment    I have reviewed the Patient Summary Reports.     I have reviewed the Nursing Notes. I have reviewed the NPO Status.   I have reviewed the Medications.     Review of Systems  Anesthesia Hx:             Denies Family Hx of Anesthesia complications.    Denies Personal Hx of Anesthesia complications.                    Cardiovascular:  Exercise tolerance: good        Denies Dysrhythmias.   Denies Angina.       Denies LINARES.                            Pulmonary:    Denies COPD.  Denies Asthma.                    Renal/:   Denies Chronic Renal Disease.                Hepatic/GI:      Denies GERD.             Neurological:    Denies CVA.    Denies Seizures.                                Endocrine:  Denies Diabetes.               Physical Exam  General: Well nourished, Cooperative, Alert and Oriented    Airway:  Mallampati: II   Mouth Opening: Normal  TM Distance: Normal  Tongue: Normal  Neck ROM: Normal ROM    Dental:  Intact        Anesthesia Plan  Type of Anesthesia, risks & benefits discussed:    Anesthesia Type: Gen Natural Airway  Intra-op Monitoring Plan: Standard ASA Monitors  Post Op Pain Control Plan: multimodal analgesia and peripheral nerve block  Induction:   IV  Informed Consent: Informed consent signed with the Patient and all parties understand the risks and agree with anesthesia plan.  All questions answered. Patient consented to blood products? Yes  ASA Score: 1  Day of Surgery Review of History & Physical: H&P Update referred to the surgeon/provider.    Ready For Surgery From Anesthesia Perspective.     .

## 2024-05-03 NOTE — INTERVAL H&P NOTE

## 2024-05-03 NOTE — OP NOTE
DATE OF PROCEDURE:  05/03/2024     SERVICE:  Orthopedics.     SURGEON:  Elvis Hughes M.D.     FIRST ASSISTANT:  YESI Duvall (RES)     PREOPERATIVE DIAGNOSES:  1.  left extra-articular distal radius fracture     POSTOPERATIVE DIAGNOSES:  1.  left extra-articular distal radius fracture     PROCEDURES PERFORMED:  1.  Open reduction and internal fixation of left distal radius fracture,   Extra-articular.     TOURNIQUET TIME:  47 minutes at 250 mmHg.     ESTIMATED BLOOD LOSS:  4 mL.     IMPLANTS:  Acumed volar distal radius plate with a combination of   cortical and locking screws and pegs.     COMPLICATIONS:  None.     PACKS AND DRAINS:  None.     SPECIMENS:  None.     ANESTHESIA:  Regional MAC.     INTRAVENOUS FLUIDS:  Crystalloid.     CONDITION:  Stable.     MICROBIOLOGY:  None.     INDICATIONS FOR PROCEDURE:  The patient is a 38-year-old female who   previously sustained significant trauma to the left upper extremity.  The patient was evaluated in the Orthopedics Clinic and found to have a displaced distal radius fracture.   The risks, benefits and alternatives to operative intervention were   discussed with the patient in detail.  Specific risks include, but   Are not limited to bleeding, infection, neurovascular damage, pain, numbness,   tingling, compartment syndrome, need for additional surgery, failure to return   to pre-injury and/or preoperative functional status, inability to return to   work, scar sensitivity, delayed healing, complex regional pain syndrome,   weakness, tendon injury, bowstringing, partial and/or incomplete relief of   symptoms, persistence of and/or worsening of symptoms, hardware and/or surgical   failure, prominent and/or symptomatic hardware, possibly necessitating future   removal, osteomyelitis, amputation, loss of function, stiffness, rotational   malalignment, functional debility, dysfunction, decreased  strength, need   for prolonged postoperative rehabilitation, malunion,  nonunion, DVT, PE,   arthritis, death, and others.  The patient consented to the risks of surgical intervention and wished to   proceed with surgery.  All questions were answered and no guarantees were   implied or stated.  Written informed consent was obtained.     PROCEDURE IN DETAIL:  On the date of the operative intervention, the patient was   evaluated in the preoperative holding area.  With their participation, the left   upper extremity was marked as the operative site.  The patient then underwent regional   anesthesia and was transferred to the Operative Suite.  The patient was then transferred   to the OR table with all superficial bony prominences well padded.  The left   upper extremity was placed on a hand table and then a nonsterile tourniquet was   placed high on the patient's upper arm.  The operative upper extremity was then   prepped and draped in the usual sterile fashion and a timeout was taken and   confirmed by all present to confirm the correct patient, site, procedure and   administration of preoperative antibiotics.  All were in agreement, so I   proceeded.  The standard FCR approach to the distal radius was   marked out for a length of approximately 6 cm.  Esmarch was utilized for exsanguination.   Tourniquet was then inflated to 250 mmHg.  Skin   incision to the volar aspect of the left distal radius was then made sharply   with a scalpel and dissection was carried down to the flexor carpi radialis   tendon.  The tendon was retracted ulnarly and the fascia deep to this was   incised sharply with a scalpel.  All flexor tendons including the flexor carpi   radialis and flexor pollicis longus were then swept ulnarly and the radial   artery was swept radially.  Great care was taken throughout the duration of the procedure to protect all neurovascular and tendinous structures.  The pronator quadratus was then identified and   sharply divided at the red-white junction distally and also the radial  aspect on   the radius.  A key elevator was then utilized to sweep the pronator quadratus   muscle off the volar surface of the radius.  The fracture site was then readily   identified.  There was no definitive intra-articular extension and the fracture itself appeared to be extra-articular.  The fracture site was then   cleaned of fracture hematoma and debris. A reduction maneuver was   performed.  I was able to restore length alignment and rotation to the fracture.  An appropriate Acumed volar distal radius plate was then   chosen and provisionally fixed to the radius with 3 K-wires.  Fluoroscopy was   then brought into the field, which verified placement of all hardware as well as   reduction.  Once I was happy with our reduction and placement of the hardware,   I then fixed the plate to the bone with a combination of distal locking screws and pegs  and the proximal shaft screws in succession.  Fluoroscopy was again utilized to   verify placement of the hardware and maintain the reduction.  Once all hardware was in its final position, I then took final   fluoroscopic x-rays, which again verified maintenance of our reduction and that   all hardware was in appropriate position and not in violation of the joint surface.  I was pleased with   the reduction and then I performed a Shuck maneuver to stress the distal   radioulnar joint, which I found to be stable.  The wrist was taken through range   of motion.  No hardware penetrated the joint.  There was no crepitus with   wrist range of motion.  The wound was then copiously irrigated with sterile   saline.  The tourniquet was let down.  There was no significant bleeding, and  hemostasis was achieved with bipolar electrocautery.  The wound was then closed   in a layered fashion with 4-0 Vicryl suture in the subcutaneous and dermal   tissues followed by 4-0 nylon in a horizontal mattress fashion to close the   skin.  Sterile dressings were then applied consisting of  Xeroform, 4 x 4s, gauze   and a short-arm volar slab splint.  The patient was then awakened from   anesthesia and returned to the Postanesthesia Care Unit in stable condition.    There were no complications and as the attending surgeon, I was present for and   performed the critical portions of the procedure.     POSTOPERATIVE PLAN FOR THE PATIENT:  The patient will be discharged home in stable condition. They will remain nonweightbearing to the operative upper extremity during   the healing process.  I will re-evaluate the patient in approximately 2 weeks for suture removal and re-evaluation of the postoperative plan.

## 2024-05-03 NOTE — ANESTHESIA PROCEDURE NOTES
Peripheral Block    Patient location during procedure: pre-op   Block not for primary anesthetic.  Reason for block: at surgeon's request and post-op pain management   Post-op Pain Location: left radius   Start time: 5/3/2024 9:20 AM  Timeout: 5/3/2024 9:15 AM   End time: 5/3/2024 9:31 AM    Staffing  Authorizing Provider: Fredrick Chambers MD  Performing Provider: Fredrick Chambers MD    Staffing  Performed by: Fredrick Chambers MD  Authorized by: Fredrick Chambers MD    Preanesthetic Checklist  Completed: patient identified, IV checked, site marked, risks and benefits discussed, surgical consent, monitors and equipment checked, pre-op evaluation and timeout performed  Peripheral Block  Patient position: supine  Prep: ChloraPrep  Patient monitoring: heart rate, cardiac monitor, continuous pulse ox, continuous capnometry and frequent blood pressure checks  Block type: supraclavicular  Laterality: left  Injection technique: single shot  Needle  Needle type: Stimuplex   Needle gauge: 22 G  Needle length: 2 in  Needle localization: anatomical landmarks and ultrasound guidance   -ultrasound image captured on disc.  Assessment  Injection assessment: negative aspiration, negative parasthesia and local visualized surrounding nerve  Paresthesia pain: none  Heart rate change: no  Slow fractionated injection: yes  Pain Tolerance: comfortable throughout block  Medications:    Medications: ropivacaine (NAROPIN) injection 0.5% - Perineural   20 mL - 5/3/2024 9:32:00 AM    Additional Notes  VSS.  [facility] RN monitoring vitals throughout procedure.  Patient tolerated procedure well.

## 2024-05-03 NOTE — DISCHARGE SUMMARY
[facility] - Surgery (Hospital)  Discharge Note  Short Stay    Procedure(s) (LRB):  ORIF, FRACTURE, RADIUS, DISTAL - LEFT poss CTR (Left)      OUTCOME: Patient tolerated treatment/procedure well without complication and is now ready for discharge.    DISPOSITION: Home or Self Care    FINAL DIAGNOSIS:  <principal problem not specified>    FOLLOWUP: In clinic    DISCHARGE INSTRUCTIONS:    Discharge Procedure Orders   Notify your health care provider if you experience any of the following:  increased confusion or weakness     Notify your health care provider if you experience any of the following:  persistent dizziness, light-headedness, or visual disturbances     Notify your health care provider if you experience any of the following:  worsening rash     Notify your health care provider if you experience any of the following:  severe persistent headache     Notify your health care provider if you experience any of the following:  difficulty breathing or increased cough     Notify your health care provider if you experience any of the following:  redness, tenderness, or signs of infection (pain, swelling, redness, odor or green/yellow discharge around incision site)     Notify your health care provider if you experience any of the following:  severe uncontrolled pain     Notify your health care provider if you experience any of the following:  persistent nausea and vomiting or diarrhea     Notify your health care provider if you experience any of the following:  temperature >100.4        TIME SPENT ON DISCHARGE: 5 minutes

## 2024-05-07 ENCOUNTER — TELEPHONE (OUTPATIENT)
Dept: ORTHOPEDICS | Facility: CLINIC | Age: 39
End: 2024-05-07
Payer: COMMERCIAL

## 2024-05-07 NOTE — ANESTHESIA POSTPROCEDURE EVALUATION
Anesthesia Post Evaluation    Patient: Nini Carter    Procedure(s) Performed: Procedure(s) (LRB):  ORIF, FRACTURE, RADIUS, DISTAL - LEFT poss CTR (Left)    Final Anesthesia Type: general      Patient location during evaluation: PACU  Patient participation: Yes- Able to Participate  Level of consciousness: awake and alert and oriented  Post-procedure vital signs: reviewed and stable  Pain management: adequate  Airway patency: patent    PONV status at discharge: No PONV  Anesthetic complications: no      Cardiovascular status: blood pressure returned to baseline and hemodynamically stable  Respiratory status: unassisted, room air and spontaneous ventilation  Hydration status: euvolemic  Follow-up not needed.              Vitals Value Taken Time   /75 05/03/24 1232   Temp 36.8 °C (98.2 °F) 05/03/24 1230   Pulse 73 05/03/24 1246   Resp 35 05/03/24 1246   SpO2 100 % 05/03/24 1245   Vitals shown include unfiled device data.      Event Time   Out of Recovery 12:07:00         Pain/Ayah Score: No data recorded

## 2024-05-07 NOTE — TELEPHONE ENCOUNTER
Spoke with the patient. She states her pain is off and on, following periods of increased activity and prolonged sling usage. Swelling has decreased, splint has loosened up since placement Friday. No splint discomfort. Not taking Percocet, or tylenol only ibuprofen.  Recommended elevation and ROM of elbow/shoulder/fingers. Advised on medication and PO care. Recommended tylenol for breakthrough pain between ibu doses. Offered appointment for tomorrow which she declines at this time. She may call us tomorrow if she changes her mind. All questions answered. Patient verbalized understanding and was thankful.     Lorena Pickering, MS, OTC  Clinical & OR Assistant to Dr. Ross Dunbar [facility]  [phone]     ----- Message from Anoop Kidd sent at 5/7/2024  3:50 PM CDT -----  Regarding: Pt Advice  Contact: pt [phone]  Pt had surgery for left hand/wrist on 5/3, pt states there is throbbing pain, would like to know if this is normal, Please call pt @[phone]

## 2024-05-16 ENCOUNTER — CLINICAL SUPPORT (OUTPATIENT)
Dept: REHABILITATION | Facility: HOSPITAL | Age: 39
End: 2024-05-16
Payer: COMMERCIAL

## 2024-05-16 ENCOUNTER — HOSPITAL ENCOUNTER (OUTPATIENT)
Dept: RADIOLOGY | Facility: HOSPITAL | Age: 39
Discharge: HOME | End: 2024-05-16
Attending: PHYSICIAN ASSISTANT
Payer: COMMERCIAL

## 2024-05-16 ENCOUNTER — OFFICE VISIT (OUTPATIENT)
Dept: ORTHOPEDICS | Facility: CLINIC | Age: 39
End: 2024-05-16
Payer: COMMERCIAL

## 2024-05-16 DIAGNOSIS — S52.502A CLOSED FRACTURE OF DISTAL END OF LEFT RADIUS, UNSPECIFIED FRACTURE MORPHOLOGY, INITIAL ENCOUNTER: Primary | ICD-10-CM

## 2024-05-16 DIAGNOSIS — M25.432 SWELLING OF LEFT WRIST: ICD-10-CM

## 2024-05-16 DIAGNOSIS — M25.532 LEFT WRIST PAIN: Primary | ICD-10-CM

## 2024-05-16 DIAGNOSIS — M25.532 LEFT WRIST PAIN: ICD-10-CM

## 2024-05-16 DIAGNOSIS — Z98.890 POSTOPERATIVE STATE: ICD-10-CM

## 2024-05-16 DIAGNOSIS — M25.632 DECREASED RANGE OF MOTION OF LEFT WRIST: Primary | ICD-10-CM

## 2024-05-16 DIAGNOSIS — S52.502A CLOSED FRACTURE OF DISTAL END OF LEFT RADIUS, UNSPECIFIED FRACTURE MORPHOLOGY, INITIAL ENCOUNTER: ICD-10-CM

## 2024-05-16 PROCEDURE — 73110 X-RAY EXAM OF WRIST: CPT | Mod: 26,LT,, | Performed by: INTERNAL MEDICINE

## 2024-05-16 PROCEDURE — 97166 OT EVAL MOD COMPLEX 45 MIN: CPT

## 2024-05-16 PROCEDURE — 97530 THERAPEUTIC ACTIVITIES: CPT

## 2024-05-16 PROCEDURE — 99999 PR PBB SHADOW E&M-EST. PATIENT-LVL II: CPT | Mod: PBBFAC,,, | Performed by: PHYSICIAN ASSISTANT

## 2024-05-16 PROCEDURE — 1159F MED LIST DOCD IN RCRD: CPT | Mod: CPTII,S$GLB,, | Performed by: PHYSICIAN ASSISTANT

## 2024-05-16 PROCEDURE — 99024 POSTOP FOLLOW-UP VISIT: CPT | Mod: S$GLB,,, | Performed by: PHYSICIAN ASSISTANT

## 2024-05-16 PROCEDURE — 73110 X-RAY EXAM OF WRIST: CPT | Mod: TC,LT

## 2024-05-16 NOTE — PATIENT INSTRUCTIONS
[facility], OCCUPATIONAL [facility]ERAPY  HOME EXERCISE PROGRAM               Complete massage 2-3 minutes 3 times a day:        Complete 10 repetitions of each exercise 4-6 times a day:                     _

## 2024-05-16 NOTE — PROGRESS NOTES
Dr. Hughes is the supervising physician for this encounter/patient    Nini Carter presents for post-operative evaluation.  The patient is now 2 weeks s/p Left ORIF distal radius fracture with Dr. Hughes on 5/3/24.  Overall the patient reports doing well.  She reports 8/10 pain off/on, denies any f/c/s. She is not taking any medications for this.    PE:    AA&O x 4.  NAD  HEENT:  NCAT, sclera nonicteric  Lungs:  Respirations are equal and unlabored.  CV:  2+ bilateral upper and lower extremity pulses.  MSK: The wound is healing well with no signs of erythema or warmth.  There is no drainage.  No clinical signs or symptoms of infection are present.  She is unable to make a full fist. SILT. DNVI.    IMAGING - reviewed with patient  Stable appearance ORIF distal radius, no hardware complications.    A/P: Status post above, doing well  1) Transition to brace today. OT ordered for postop rehab.  2) All sutures removed today. Wound care and signs of infection discussed. Scar massage discussed.  3) F/U 4 weeks, repeat xrays.  4) Call with any questions/concerns in the interim      Jamie Russo-DiGeorge PA-C

## 2024-05-17 ENCOUNTER — CLINICAL SUPPORT (OUTPATIENT)
Dept: REHABILITATION | Facility: HOSPITAL | Age: 39
End: 2024-05-17
Payer: COMMERCIAL

## 2024-05-17 DIAGNOSIS — M25.432 SWELLING OF LEFT WRIST: ICD-10-CM

## 2024-05-17 DIAGNOSIS — M25.632 DECREASED RANGE OF MOTION OF LEFT WRIST: Primary | ICD-10-CM

## 2024-05-17 DIAGNOSIS — M25.532 LEFT WRIST PAIN: ICD-10-CM

## 2024-05-17 PROCEDURE — L3906 WHO W/O JOINTS CF: HCPCS

## 2024-05-20 ENCOUNTER — CLINICAL SUPPORT (OUTPATIENT)
Dept: REHABILITATION | Facility: HOSPITAL | Age: 39
End: 2024-05-20
Payer: COMMERCIAL

## 2024-05-20 DIAGNOSIS — M25.632 DECREASED RANGE OF MOTION OF LEFT WRIST: Primary | ICD-10-CM

## 2024-05-20 DIAGNOSIS — M25.532 LEFT WRIST PAIN: ICD-10-CM

## 2024-05-20 DIAGNOSIS — M25.432 SWELLING OF LEFT WRIST: ICD-10-CM

## 2024-05-20 PROCEDURE — 97110 THERAPEUTIC EXERCISES: CPT

## 2024-05-20 PROCEDURE — 97530 THERAPEUTIC ACTIVITIES: CPT

## 2024-05-20 PROCEDURE — 97140 MANUAL THERAPY 1/> REGIONS: CPT

## 2024-05-20 NOTE — PROGRESS NOTES
[facility]  Occupational Therapy Treatment Note    Date: 5/20/2024  Name: Nini Carter  [facility] Number: [id number]    Therapy Diagnosis:   Encounter Diagnoses   Name Primary?    Decreased range of motion of left wrist Yes    Swelling of left wrist     Left wrist pain      Physician: Russo-Digeorge, Jamie L*    Physician Orders: eval and treat, and custom forearm based wrist cock up orthosis.   Medical Diagnosis: Closed fracture of distal end of left radius, unspecified fracture morphology, initial encounter [S52.502A], Postoperative state [Z98.890]   Surgical Procedure and Date: 5/3/24, Open reduction and internal fixation of left distal radius fracture,   Extra-articular.  Onset Date: 4/30/24  Evaluation Date: 5/16/2024  Insurance Authorization Period Expiration: 5/16/25  Plan of Care Certification Period: 7/26/24  Date of Return to MD: 6/13/24  Visit # / Visits authorized: 3 / 20     FOTO: TBA    Precautions:  Standard and Weightbearing    Time In: 1:00  Time Out: 1:55  Total Billable Time: 55 minutes      SUBJECTIVE     Pt reports: she is doing a little better and has been doing her exercises.   She was compliant with home exercise program given last session.   Response to previous treatment:good  Functional change: none at this time due to precautions    Pain: 0/10  Location: left wrist    OBJECTIVE   Objective Measures updated at progress report unless specified.    Observation/Appearance:  bruising noted at medial L elbow and volar forearm, palm, and thumb.  steristrips present, healing incision ~7 cm. Mild swelling noted. Significant stiffness with supination. Pt arrived with pre juvenal wrist brace.      Edema. Measured in centimeters.    5/16/2024 5/16/2024     Right  Left    Proximal Wrist Crease 15.6 17   DPC 18.6 18.7   MCPs 18.6 18.3            Elbow and Wrist ROM. Measured in degrees.    5/21/2024 5/21/2024     Right Left   Elbow Ext/Flex       Supination/Pronation WNL  -30/78   Wrist Ext/Flex 62/70 25/12   Wrist RD/UD 15/25       15/5         Hand ROM. Measured in degrees.    5/16/2024 5/16/2024     Right  Left            Index: MP    60              PIP       85              DIP   50              TONEY   195           Long:  MP                  PIP                  DIP                  TONEY   WFL           Ring:   MP                  PIP                  DIP                  TONEY   WFL           Small:  MP                   PIP                   DIP                  TONEY   WFL           Thumb: MP   28                IP   25       Rad ADD/ABD   32       Pal ADD/ABD   40          Opposition   To P2 of SF      Sensation: denied numbness and tingling               Strength (Dyanmometer) and Pinch Strength (Pinch Gauge)  Measured in pounds and psi. Average of three trials.    5/16/2024 5/16/2024     Right  Left    Rung II deferred deferred   Key Pinch       3pt Pinch       2pt Pinch             Treatment     Nini received the treatments listed below:     Supervised modalities after being cleared for contradictions: Hot Pack -   Patient received MH x 10 min to L hand/wrist to increase blood flow, circulation and tissue elasticity prior to therex      Manual therapy techniques: Manual Lymphatic Drainage and Soft tissue Mobilization were applied to the: left hand, wrist, and forearm for 10 minutes, including:  Pt received soft tissue mobilization to L forearm, wrist, and hand. Gentle scar mobilization over incision. Incision still     Therapeutic exercises to develop ROM and flexibility for 25 minutes, including:  AROM   Sup/Pro  Wrist  Ext/flx  RD/UD    X 10 reps each    AROM   Spreads  Wave  Hook  Straight fist  Composite fist  Lifts X 10 reps each   AROM thumb:  Rad abd/add  Palmar abd/add  IP blocks  Opposition  Pinky Slides    X 10 reps each          Therapeutic activities to improve functional performance for 10  minutes, including:  -isospheres on table  -in hand manipulation  with large poms x 2 sets  -wrist maze for functional motion    Neuromuscular re-education activities to improve Coordination, Kinesthetic, and Proprioception for 0 minutes. The following activities were included:  NT    Patient Education and Home Exercises      Education provided:   - reviewed HEP and incision care  - Progress towards goals     Written Home Exercises Provided: Patient instructed to cont prior HEP.  Exercises were reviewed and Nini was able to demonstrate them prior to the end of the session.  Nini demonstrated good  understanding of the HEP provided. See EMR under Patient Instructions for exercises provided during therapy sessions.      ASSESSMENT      Pt arrived for first OT treatment session. She tolerated tx fairly well, with rest breaks as needed. She participated well. She cont to present with mild pain, wrist swelling, healing incision, and wrist and forearm stiffness. Supination cont to be most limited but improving per observation.      Nini is progressing fairly  towards her goals and there are no updates to goals at this time. Pt prognosis is Good.     Pt will continue to benefit from skilled outpatient occupational therapy to address the deficits listed in the problem list on initial evaluation, provide pt/family education and to maximize pt's level of independence in the home and community environment.     Pt's spiritual, cultural and educational needs considered and pt agreeable to plan of care and goals.    Anticipated barriers to occupational therapy: stiffness    Goals:  Long Term Goals (LTGs); to be met by discharge.  1) Pt will report pain no higher than 2/10 with daily tasks and  tasks  2) Pt will have an improved FOTO score by at least 20 points  3) Pt will demonstrate improved left wrist  AROM WFL for performing daily functional tasks and grasping  4)Pt will demonstrate improved left forearm AROM WFL for her daily functional activities.   5) Pt will  report return to prior level of function  6)  and pinch to be assessed when appropriate and set goals     Short Term Goals (STGs); to be met within 4 weeks (6/16/24).  1) Pt will report or demonstrate independence with HEP and brace wear  2) Pt will report pain no higher than 4/10 with daily tasks  3) Pt will demonstrate improved left wrist and forearm AROM by at least 20 degrees each for improved function and reach  4) Pt will demonstrate improved left wrist edema by at least 0.3-0.5 cm for increased motion and use.     PLAN     Continue skilled occupational therapy with individualized plan of care focusing on improving motion and maximizing functional use of her hand    Updates/Grading for next session: cont to progress per protocol, with AROM    JED Garibay, ACT

## 2024-05-21 NOTE — PLAN OF CARE
[facility]  Occupational Therapy Initial Evaluation     Name: Nini Carter  [facility] Number: [id number]    Therapy Diagnosis:   Encounter Diagnoses   Name Primary?    Closed fracture of distal end of left radius, unspecified fracture morphology, initial encounter     Postoperative state     Decreased range of motion of left wrist Yes    Swelling of left wrist     Left wrist pain      Physician: Russo-Digeorge, Jamie L*; Dr. Elvis Hughes    Physician Orders: Eval and Treat, custom wrist cock up orthosis fabrication  Medical Diagnosis: Closed fracture of distal end of left radius, unspecified fracture morphology, initial encounter [S52.502A], Postoperative state [Z98.890]   Surgical Procedure and Date: 5/3/24, Open reduction and internal fixation of left distal radius fracture,   Extra-articular.  Onset Date: 4/30/24  Evaluation Date: 5/16/2024  Insurance Authorization Period Expiration: 5/16/25  Plan of Care Certification Period: 7/26/24  Date of Return to MD: 6/13/24  Visit # / Visits authorized: 1 / 1    FOTO: TBA    Precautions:  Standard and Weightbearing, NWB    Time In:3:00  Time Out: 4:00  Total Appointment Time (timed & untimed codes): 60 minutes    Subjective       History of Current Condition/Mechanism of Injury: Nini reports: she fell when roller skating. She reports pain and sensitivity to wrist. Clarifying with PA regarding custom orthosis. Pt with  and baby present. She arrived today after ortho visit with PA.     Falls: yes    Involved Side: left  Dominant Side: Right    Mechanism of Injury: fall  Surgical Procedure: see above  Imaging: x-ray on 5/16/24 indicated: Cast material partly obscures osseous detail. There has been interval internal fixation with plate and screws at the distal radius, fixating the previously demonstrated comminuted fracture. Hardware is intact and alignment is satisfactory.       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      Current Functional Status   Home/Living environment: lives with their family          Limitation of Functional Status as follows:   ADLs/IADLs:     - Feeding: I    - Bathing: I    - Dressing/Grooming: getting min assist with ADLs    - Home Management: getting assist with caring for their daughter and doing things around the house.     - Driving: has only attempted 1 time so far.     -not lifting or pushing     Leisure: did not report    Patient's Goals for Therapy: to make a full recovery    Past Medical History/Physical Systems Review:   Nini Carter  has no past medical history on file.    Nini Carter  has a past surgical history that includes [place] tooth extraction and Open reduction and internal fixation (ORIF) of fracture of distal radius (Left, 5/3/2024).    Nini has a current medication list which includes the following prescription(s): acetaminophen, ibuprofen, and oxycodone-acetaminophen.    Review of patient's allergies indicates:   Allergen Reactions    Hydrocodone Itching    Codeine Nausea And Vomiting and Anxiety        Objective     Observation/Appearance:  bruising noted at medial L elbow and volar forearm, palm, and thumb.  steristrips present, healing incision ~7 cm. Mild swelling noted. Significant stiffness with supination. Pt arrived with pre juvenal wrist brace.     Edema. Measured in centimeters.   5/16/2024 5/16/2024    Right  Left    Proximal Wrist Crease 15.6 17   DPC 18.6 18.7   MCPs 18.6 18.3         Elbow and Wrist ROM. Measured in degrees.   5/21/2024 5/21/2024    Right Left    Elbow Ext/Flex     Supination/Pronation WNL -30/78   Wrist Ext/Flex 62/70 25/12   Wrist RD/UD 15/25       15/5       Hand ROM. Measured in degrees.   5/16/2024 5/16/2024    Right  Left         Index: MP   60              PIP      85              DIP  50              TONEY  195        Long:  MP                PIP                DIP                TONEY  WFL        Ring:   MP                PIP                DIP                TONEY  WFL        Small:  MP                 PIP                 DIP                TONEY  WFL        Thumb: MP  28                IP  25       Rad ADD/ABD  32       Pal ADD/ABD  40          Opposition  To P2 of SF     Sensation: denied numbness and tingling           Strength (Dyanmometer) and Pinch Strength (Pinch Gauge)  Measured in pounds and psi. Average of three trials.   5/16/2024 5/16/2024    Right  Left    Rung II deferred deferred   Key Pinch     3pt Pinch     2pt Pinch         Manual Muscle Testing    Manual Muscle Test: deferred   5/21/2024 5/21/2024    Left Right   Wrist Extension      Wrist Flexion     Radial Deviation     Ulnar Deviation     Supination     Pronation     Elbow Extension     Elbow Flexion               CMS Impairment/Limitation/Restriction for FOTO Wrist Survey    FOTO scores for Nini HERNANDEZ Raul on 5/16/2024.   FOTO documents entered into [employer] - see Media section.    Intake Score: TBA           Treatment     Total Treatment time separate from Evaluation time:15 minutes    Nini received the treatments listed below:        therapeutic activities to improve functional performance for 15  minutes, including: **min to mod cuing required for technique  Instructed on HEP and precautions.  HEP included tendon glides, finger spreads, finger lifts, wrist AROM 4 ways, and supination/pronation AROM as able        Patient Education and Home Exercises      Education provided:   -role of OT, goals for OT, scheduling/cancellations, insurance limitations with  patient.  -Additional Education provided:   -educated on brace wear and scheduled pt for follow up tomorrow for custom orthosis per PA orders.   -educated on precautions and HEP as above    Written Home Exercises Provided: yes.  Exercises were reviewed and Nini was able to demonstrate them prior to the end of the session.    Nini demonstrated good  understanding of the education provided.     Pt was advised to perform these exercises free of pain, and to stop performing them if pain occurs.    See EMR under Patient Instructions for exercises provided 5/16/2024.    Assessment     Nini Carter is a 38 y.o. female referred to outpatient occupational therapy and presents with a medical diagnosis of s/p ORIF of left DR fx.    Following medical record review it is determined that pt will benefit from occupational therapy services in order to maximize pain free and/or functional use of left hand. The following goals were discussed with the patient and patient is in agreement with them as to be addressed in the treatment plan. The patient's rehab potential is Good.     Anticipated barriers to occupational therapy: stiffness, pain    Plan of care discussed with patient: Yes  Patient's spiritual, cultural and educational needs considered and patient is agreeable to the plan of care and goals as stated below:     Medical Necessity is demonstrated by the following  Occupational Profile/History  Co-morbidities and personal factors that may impact the plan of care [] LOW: Brief chart review  [x] MODERATE: Expanded chart review   [] HIGH: Extensive chart review    Moderate / High Support Documentation: expanded chart review     Examination  Performance deficits relating to physical, cognitive or psychosocial skills that result in activity limitations and/or participation restrictions  [] LOW: addressing 1-3 Performance deficits  [] MODERATE: 3-5 Performance deficits  [x] HIGH: 5+ Performance deficits (please  support below)    Moderate / High Support Documentation:    Physical:  Joint Mobility  Muscle Power/Strength  Skin Integrity/Scar Formation  Edema   Strength  Fine Motor Coordination  Pain    Cognitive:  No Deficits    Psychosocial:    Habits  Routines     Treatment Options [] LOW: Limited options  [] MODERATE: Several options  [x] HIGH: Multiple options     min assistance required     Decision Making/ Complexity Score: moderate       The following goals were discussed with the patient and patient is in agreement with them as to be addressed in the treatment plan.     Goals:   Long Term Goals (LTGs); to be met by discharge.  1) Pt will report pain no higher than 2/10 with daily tasks and  tasks  2) Pt will have an improved FOTO score by at least 20 points  3) Pt will demonstrate improved left wrist  AROM WFL for performing daily functional tasks and grasping  4)Pt will demonstrate improved left forearm AROM WFL for her daily functional activities.   5) Pt will report return to prior level of function  6)  and pinch to be assessed when appropriate and set goals    Short Term Goals (STGs); to be met within 4 weeks (6/16/24).  1) Pt will report or demonstrate independence with HEP and brace wear  2) Pt will report pain no higher than 4/10 with daily tasks  3) Pt will demonstrate improved left wrist and forearm AROM by at least 20 degrees each for improved function and reach  4) Pt will demonstrate improved left wrist edema by at least 0.3-0.5 cm for increased motion and use.       Plan   Certification Period/Plan of care expiration: 5/16/2024 to 7/26/24.    Outpatient Occupational Therapy 2-3 times weekly for 10 weeks to include the following interventions: Paraffin, Fluidotherapy, Manual therapy/joint mobilizations, Modalities for pain management, US 3 mhz, Therapeutic exercises/activities., Strengthening, Orthotic Fabrication/Fit/Training, Edema Control, Scar Management, Joint Protection, and Energy  Conservation.  Pt reports the [facility] clinic is closer and requested to transfer there for therapy. Scheduled a few visits at [place] until [facility]. Will fabricate custom wrist cock up orthosis tomorrow.     JED Garibay, ACT      I certify the need for these services furnished under the plan of treatment and while under my care.     ____________________________________________________________________  Physician/Referring Practitioner   Date of Signature

## 2024-05-21 NOTE — PROGRESS NOTES
OT Orthosis Only    TIME RECORD    Date:  5/17/2024    Start Time:  12:30  Stop Time:  1:05    PROCEDURES:      UNTIMED  Procedure Min.    -               Total Timed Minutes:  0  Total Timed Units:  0  Total Untimed Units:  1  Charges Billed/# of units:   [id number] 1    Occupational Therapy Orthotic Note    Patient: Nini Carter  MRN: [id number]  Date of visit: 5/17/2024  Referring Physician:  Dr. Elvis Hughes; Jamie Russo-Digeorge, PA  Next MD visit: 6/13/24  Primary Diagnosis:   S52.502A (ICD-10-CM) - Unspecified fracture of the lower end of left radius, initial encounter for closed fracture   Z98.890 (ICD-10-CM) - Other specified postprocedural states     Treatment Diagnosis:   Encounter Diagnoses   Name Primary?    Decreased range of motion of left wrist Yes    Swelling of left wrist     Left wrist pain      DOS: 5/3/24, Open reduction and internal fixation of left distal radius fracture,     Subjective:     Pt reports she is doing fairly well. She tried doing some of her HEP, but did report pain this morning with it. She arrived with her  and baby today. She presents for custom orthosis per PA orders.     Objective:     Patient seen by OT this session for fabrication of orthosis per MD orders. Fabricated and applied custom fabricated volar wrist cock up orthosis.   Assessment:     Orthosis with good fit following fabrication. Pt. Able to joaquin/doff independently.      Patient Education/Response:   Pt. Instructed to wear continuous, remove for HEP, bathing or hygiene. Patient/caregiver were provided with instructions on orthosis purpose, wear schedule, care and precautions to monitor for increased pain/edema, pressure points, skin breakdown or redness/skin irritation Patient/caregiver to contact clinic for adjustments as needed.      Plans and Goals:     Goal of Orthosis to protect sx site/protect injury site full time except for HEP and hygiene. Pt will follow up with OT again next week for regular  session. Orthosis Check PRN    JED Garibay, ACT

## 2024-05-23 ENCOUNTER — CLINICAL SUPPORT (OUTPATIENT)
Dept: REHABILITATION | Facility: HOSPITAL | Age: 39
End: 2024-05-23
Payer: COMMERCIAL

## 2024-05-23 DIAGNOSIS — M25.532 LEFT WRIST PAIN: ICD-10-CM

## 2024-05-23 DIAGNOSIS — M25.432 SWELLING OF LEFT WRIST: ICD-10-CM

## 2024-05-23 DIAGNOSIS — M25.632 DECREASED RANGE OF MOTION OF LEFT WRIST: Primary | ICD-10-CM

## 2024-05-23 PROCEDURE — 97530 THERAPEUTIC ACTIVITIES: CPT

## 2024-05-23 PROCEDURE — 97110 THERAPEUTIC EXERCISES: CPT

## 2024-05-23 PROCEDURE — 97140 MANUAL THERAPY 1/> REGIONS: CPT

## 2024-05-23 NOTE — PROGRESS NOTES
[facility]  Occupational Therapy Treatment Note    Date: 5/23/2024  Name: Nini Carter  [facility] Number: [id number]    Therapy Diagnosis:   Encounter Diagnoses   Name Primary?    Decreased range of motion of left wrist Yes    Swelling of left wrist     Left wrist pain      Physician: No ref. provider found    Physician Orders: eval and treat, and custom forearm based wrist cock up orthosis.   Medical Diagnosis: Closed fracture of distal end of left radius, unspecified fracture morphology, initial encounter [S52.502A], Postoperative state [Z98.890]   Surgical Procedure and Date: 5/3/24, Open reduction and internal fixation of left distal radius fracture,   Extra-articular.  Onset Date: 4/30/24  Evaluation Date: 5/16/2024  Insurance Authorization Period Expiration: 5/16/25  Plan of Care Certification Period: 7/26/24  Date of Return to MD: 6/13/24  Visit # / Visits authorized: 4 / 20     FOTO: TBA    Precautions:  Standard and Weightbearing    Time In: 9:00  Time Out: 9:55  Total Billable Time: 55 minutes      SUBJECTIVE     Pt reports: she still has ulnar swelling and pain. Most limitation with ulnar side and feels it is limiting her motion.   She was compliant with home exercise program given last session.   Response to previous treatment:fair  Functional change: none at this time due to precautions    Pain: 0/10  Location: left wrist    OBJECTIVE   Objective Measures updated at progress report unless specified.    Observation/Appearance:  bruising noted at medial L elbow and volar forearm, palm, and thumb.  steristrips present, healing incision ~7 cm. Mild swelling noted. Significant stiffness with supination. Pt arrived with pre juvenal wrist brace.      Edema. Measured in centimeters.    5/16/2024 5/16/2024     Right  Left    Proximal Wrist Crease 15.6 17   DPC 18.6 18.7   MCPs 18.6 18.3            Elbow and Wrist ROM. Measured in degrees.    5/21/2024 5/21/2024     Right Left   Elbow  Ext/Flex       Supination/Pronation WNL -30/78   Wrist Ext/Flex 62/70 25/12   Wrist RD/UD 15/25       15/5         Hand ROM. Measured in degrees.    5/16/2024 5/16/2024     Right  Left            Index: MP    60              PIP       85              DIP   50              TONEY   195           Long:  MP                  PIP                  DIP                  TONEY   WFL           Ring:   MP                  PIP                  DIP                  TONEY   WFL           Small:  MP                   PIP                   DIP                  TONEY   WFL           Thumb: MP   28                IP   25       Rad ADD/ABD   32       Pal ADD/ABD   40          Opposition   To P2 of SF      Sensation: denied numbness and tingling               Strength (Dyanmometer) and Pinch Strength (Pinch Gauge)  Measured in pounds and psi. Average of three trials.    5/16/2024 5/16/2024     Right  Left    Rung II deferred deferred   Key Pinch       3pt Pinch       2pt Pinch             Treatment     Nini received the treatments listed below:     Supervised modalities after being cleared for contradictions: Hot Pack -   Patient received MH x 10 min to L hand/wrist to increase blood flow, circulation and tissue elasticity prior to therex      Manual therapy techniques: Manual Lymphatic Drainage and Soft tissue Mobilization were applied to the: left hand, wrist, and forearm for 10 minutes, including:  Pt received soft tissue mobilization to L forearm, wrist, and hand. Gentle scar mobilization over incision. Incision still healing with steristrips present.    Therapeutic exercises to develop ROM and flexibility for 20 minutes, including:  AROM   Sup/Pro  Wrist  Ext/flx  RD/UD    X 10 reps each    AROM   Spreads  Wave  Hook  Straight fist  Composite fist  Lifts X 10 reps each   AROM thumb:  Rad abd/add  Palmar abd/add  IP blocks  Opposition  Pinky Slides    X 10 reps each    Wrist wheel, wrist flex/ext, supination/pronation X 2' each    Wrist AROM over wedge, with med non weighted dowel X 15 reps each         Therapeutic activities to improve functional performance for 10  minutes, including:  -isospheres on table x 3'  -in hand manipulation with large poms x 2 sets  -wrist maze for functional motion x 3'    Neuromuscular re-education activities to improve Coordination, Kinesthetic, and Proprioception for 5 minutes. The following activities were included:  -wrist proprioception with srinivas balance x 3'  -wrist proprioception with tennis ball, alphabet x 1 set    Patient Education and Home Exercises      Education provided:   - reviewed HEP and incision care  - Progress towards goals     Written Home Exercises Provided: Patient instructed to cont prior HEP.  Exercises were reviewed and Nini was able to demonstrate them prior to the end of the session.  Nini demonstrated good  understanding of the HEP provided. See EMR under Patient Instructions for exercises provided during therapy sessions.      ASSESSMENT     She tolerated tx fairly well, with rest breaks as needed. She participated well. She cont to present with mild pain, wrist swelling, healing incision, and wrist and forearm stiffness. Supination cont to be most limited but improving per observation again this session. Supination is nearly to neutral today with exercise. Will cont to address functional ROM and use of her hand.       Nini is progressing fairly  towards her goals and there are no updates to goals at this time. Pt prognosis is Good.     Pt will continue to benefit from skilled outpatient occupational therapy to address the deficits listed in the problem list on initial evaluation, provide pt/family education and to maximize pt's level of independence in the home and community environment.     Pt's spiritual, cultural and educational needs considered and pt agreeable to plan of care and goals.    Anticipated barriers to occupational therapy: stiffness    Goals:  Long  Term Goals (LTGs); to be met by discharge.  1) Pt will report pain no higher than 2/10 with daily tasks and  tasks ---in progress  2) Pt will have an improved FOTO score by at least 20 points---in progress  3) Pt will demonstrate improved left wrist  AROM WFL for performing daily functional tasks and grasping---in progress  4)Pt will demonstrate improved left forearm AROM WFL for her daily functional activities. ---in progress  5) Pt will report return to prior level of function---in progress  6)  and pinch to be assessed when appropriate and set goals---in progress     Short Term Goals (STGs); to be met within 4 weeks (6/16/24).  1) Pt will report or demonstrate independence with HEP and brace wear---in progress  2) Pt will report pain no higher than 4/10 with daily tasks---in progress  3) Pt will demonstrate improved left wrist and forearm AROM by at least 20 degrees each for improved function and reach---in progress  4) Pt will demonstrate improved left wrist edema by at least 0.3-0.5 cm for increased motion and use. ---in progress    PLAN     Continue skilled occupational therapy with individualized plan of care focusing on improving motion and maximizing functional use of her hand    Updates/Grading for next session: cont to progress per protocol, with AROM    JED Garibay, ACT

## 2024-05-28 ENCOUNTER — CLINICAL SUPPORT (OUTPATIENT)
Dept: REHABILITATION | Facility: HOSPITAL | Age: 39
End: 2024-05-28
Payer: COMMERCIAL

## 2024-05-28 DIAGNOSIS — M25.632 DECREASED RANGE OF MOTION OF LEFT WRIST: Primary | ICD-10-CM

## 2024-05-28 DIAGNOSIS — M25.432 SWELLING OF LEFT WRIST: ICD-10-CM

## 2024-05-28 DIAGNOSIS — M25.532 LEFT WRIST PAIN: ICD-10-CM

## 2024-05-28 PROCEDURE — 97110 THERAPEUTIC EXERCISES: CPT | Mod: PO

## 2024-05-28 PROCEDURE — 97010 HOT OR COLD PACKS THERAPY: CPT | Mod: PO

## 2024-05-28 PROCEDURE — 97140 MANUAL THERAPY 1/> REGIONS: CPT | Mod: PO

## 2024-05-28 NOTE — PROGRESS NOTES
[facility]  Occupational Therapy Treatment Note    Date: 5/28/2024  Name: Nini Carter  [facility] Number: [id number]    Therapy Diagnosis:   Encounter Diagnoses   Name Primary?    Decreased range of motion of left wrist Yes    Swelling of left wrist     Left wrist pain      Physician: Russo-Digeorge, Jamie L*    Physician Orders: eval and treat, and custom forearm based wrist cock up orthosis.   Medical Diagnosis: Closed fracture of distal end of left radius, unspecified fracture morphology, initial encounter [S52.502A], Postoperative state [Z98.890]   Surgical Procedure and Date: 5/3/24, Open reduction and internal fixation of left distal radius fracture,   Extra-articular.  Onset Date: 4/30/24  Evaluation Date: 5/16/2024  Insurance Authorization Period Expiration: 5/16/25  Plan of Care Certification Period: 7/26/24  Date of Return to MD: 6/13/24  Visit # / Visits authorized: 5 / 20     FOTO: TBA    Precautions:  Standard and Weightbearing    Time In: 3:15  Time Out: 4:00  Total Billable Time: 45 minutes      SUBJECTIVE     Pt reports: Ulnar tightness and swelling is noted. Steri strips remain in place. Pt presents with significant caution and fear.  She was compliant with home exercise program given last session.   Response to previous treatment:fair  Functional change: none at this time due to precautions    Pain: 0/10  Location: left wrist    OBJECTIVE   Objective Measures updated at progress report unless specified.    Observation/Appearance:  bruising noted at medial L elbow and volar forearm, palm, and thumb.  steristrips present, healing incision ~7 cm. Mild swelling noted. Significant stiffness with supination. Pt arrived with pre juvenal wrist brace.      Edema. Measured in centimeters.    5/16/2024 5/16/2024     Right  Left    Proximal Wrist Crease 15.6 17   DPC 18.6 18.7   MCPs 18.6 18.3            Elbow and Wrist ROM. Measured in degrees.    5/21/2024 5/21/2024     Right Left    Elbow Ext/Flex       Supination/Pronation WNL -30/78   Wrist Ext/Flex 62/70 25/12   Wrist RD/UD 15/25       15/5         Hand ROM. Measured in degrees.    5/16/2024 5/16/2024     Right  Left            Index: MP    60              PIP       85              DIP   50              TONEY   195           Long:  MP                  PIP                  DIP                  TONEY   WFL           Ring:   MP                  PIP                  DIP                  TONEY   WFL           Small:  MP                   PIP                   DIP                  TONEY   WFL           Thumb: MP   28                IP   25       Rad ADD/ABD   32       Pal ADD/ABD   40          Opposition   To P2 of SF      Sensation: denied numbness and tingling               Strength (Dyanmometer) and Pinch Strength (Pinch Gauge)  Measured in pounds and psi. Average of three trials.    5/16/2024 5/16/2024     Right  Left    Rung II deferred deferred   Key Pinch       3pt Pinch       2pt Pinch             Treatment     Nini received the treatments listed below:     Supervised modalities after being cleared for contradictions: Hot Pack -   Patient received MH x 10 min to L hand/wrist to increase blood flow, circulation and tissue elasticity prior to therex      Manual therapy techniques: Manual Lymphatic Drainage and Soft tissue Mobilization were applied to the: left hand, wrist, and forearm for 10 minutes, including:  Pt received soft tissue mobilization to L forearm, wrist, and hand. Gentle scar mobilization over incision.    Therapeutic exercises to develop ROM and flexibility for 20 minutes, including:  AROM   Sup/Pro  Wrist  Ext/flx  RD/UD    X 10 reps each    AROM   Spreads  Wave  Hook  Straight fist  Composite fist  Lifts X 10 reps each   AROM thumb:  Rad abd/add  Palmar abd/add  IP blocks  Opposition  Pinky Slides    X 10 reps each    Wrist wheel, wrist flex/ext, supination/pronation X 2' each   Wrist AROM over wedge, with med non  weighted dowel X 15 reps each         Therapeutic activities to improve functional performance for 10  minutes, including:  -isospheres on table x 3'  -in hand manipulation with large poms x 2 sets  -wrist maze for functional motion x 3'    Neuromuscular re-education activities to improve Coordination, Kinesthetic, and Proprioception for 5 minutes. The following activities were included:  -wrist proprioception with srinivas balance x 3'  -wrist proprioception with tennis ball, alphabet x 1 set    Patient Education and Home Exercises      Education provided:   - reviewed HEP and incision care  - Progress towards goals     Written Home Exercises Provided: Patient instructed to cont prior HEP.  Exercises were reviewed and Nini was able to demonstrate them prior to the end of the session.  Nini demonstrated good  understanding of the HEP provided. See EMR under Patient Instructions for exercises provided during therapy sessions.      ASSESSMENT     Steri strips removed today with significant breaks required d/t pt sensitivity and fearfulness; incision is healed well with minimal scabbing remaining. Gentle ROM continued today with breaks required d/t nausea. Pt educated on complications that can occur secondary to fearfulness.     Nini is progressing fairly  towards her goals and there are no updates to goals at this time. Pt prognosis is Good.     Pt will continue to benefit from skilled outpatient occupational therapy to address the deficits listed in the problem list on initial evaluation, provide pt/family education and to maximize pt's level of independence in the home and community environment.     Pt's spiritual, cultural and educational needs considered and pt agreeable to plan of care and goals.    Anticipated barriers to occupational therapy: stiffness    Goals:  Long Term Goals (LTGs); to be met by discharge.  1) Pt will report pain no higher than 2/10 with daily tasks and  tasks ---in  progress  2) Pt will have an improved FOTO score by at least 20 points---in progress  3) Pt will demonstrate improved left wrist  AROM WFL for performing daily functional tasks and grasping---in progress  4)Pt will demonstrate improved left forearm AROM WFL for her daily functional activities. ---in progress  5) Pt will report return to prior level of function---in progress  6)  and pinch to be assessed when appropriate and set goals---in progress     Short Term Goals (STGs); to be met within 4 weeks (6/16/24).  1) Pt will report or demonstrate independence with HEP and brace wear---in progress  2) Pt will report pain no higher than 4/10 with daily tasks---in progress  3) Pt will demonstrate improved left wrist and forearm AROM by at least 20 degrees each for improved function and reach---in progress  4) Pt will demonstrate improved left wrist edema by at least 0.3-0.5 cm for increased motion and use. ---in progress    PLAN     Continue skilled occupational therapy with individualized plan of care focusing on improving motion and maximizing functional use of her hand    Updates/Grading for next session: cont to progress per protocol, with AROM    Nini Doty, OT

## 2024-05-30 ENCOUNTER — CLINICAL SUPPORT (OUTPATIENT)
Dept: REHABILITATION | Facility: HOSPITAL | Age: 39
End: 2024-05-30
Payer: COMMERCIAL

## 2024-05-30 DIAGNOSIS — M25.532 LEFT WRIST PAIN: ICD-10-CM

## 2024-05-30 DIAGNOSIS — M25.632 DECREASED RANGE OF MOTION OF LEFT WRIST: Primary | ICD-10-CM

## 2024-05-30 DIAGNOSIS — M25.432 SWELLING OF LEFT WRIST: ICD-10-CM

## 2024-05-30 PROCEDURE — 97110 THERAPEUTIC EXERCISES: CPT | Mod: PO

## 2024-05-30 PROCEDURE — 97140 MANUAL THERAPY 1/> REGIONS: CPT | Mod: PO

## 2024-05-30 PROCEDURE — 97022 WHIRLPOOL THERAPY: CPT | Mod: PO

## 2024-05-31 NOTE — PROGRESS NOTES
[facility]  Occupational Therapy Treatment Note    Date: 5/30/2024  Name: Nini Carter  [facility] Number: [id number]    Therapy Diagnosis:   Encounter Diagnoses   Name Primary?    Decreased range of motion of left wrist Yes    Swelling of left wrist     Left wrist pain      Physician: Russo-Digeorge, Jamie L*    Physician Orders: eval and treat, and custom forearm based wrist cock up orthosis.   Medical Diagnosis: Closed fracture of distal end of left radius, unspecified fracture morphology, initial encounter [S52.502A], Postoperative state [Z98.890]   Surgical Procedure and Date: 5/3/24, Open reduction and internal fixation of left distal radius fracture,   Extra-articular.  Onset Date: 4/30/24  Evaluation Date: 5/16/2024  Insurance Authorization Period Expiration: 5/16/25  Plan of Care Certification Period: 7/26/24  Date of Return to MD: 6/13/24  Visit # / Visits authorized: 5 / 20     FOTO: TBA    Precautions:  Standard and Weightbearing    Time In: 2:15  Time Out: 3:00  Total Billable Time: 45 minutes      SUBJECTIVE     Pt reports: She has had improved tolerance of self massage and tactile input  She was compliant with home exercise program given last session.   Response to previous treatment:fair  Functional change: none at this time due to precautions    Pain: 0/10  Location: left wrist    OBJECTIVE   Objective Measures updated at progress report unless specified.    Observation/Appearance:  bruising noted at medial L elbow and volar forearm, palm, and thumb.  steristrips present, healing incision ~7 cm. Mild swelling noted. Significant stiffness with supination. Pt arrived with pre juvenal wrist brace.      Edema. Measured in centimeters.    5/16/2024 5/16/2024     Right  Left    Proximal Wrist Crease 15.6 17   DPC 18.6 18.7   MCPs 18.6 18.3            Elbow and Wrist ROM. Measured in degrees.    5/21/2024 5/21/2024     Right Left   Elbow Ext/Flex       Supination/Pronation WNL  -30/78   Wrist Ext/Flex 62/70 25/12   Wrist RD/UD 15/25       15/5         Hand ROM. Measured in degrees.    5/16/2024 5/16/2024     Right  Left            Index: MP    60              PIP       85              DIP   50              TONEY   195           Long:  MP                  PIP                  DIP                  TONEY   WFL           Ring:   MP                  PIP                  DIP                  TONEY   WFL           Small:  MP                   PIP                   DIP                  TONEY   WFL           Thumb: MP   28                IP   25       Rad ADD/ABD   32       Pal ADD/ABD   40          Opposition   To P2 of SF      Sensation: denied numbness and tingling               Strength (Dyanmometer) and Pinch Strength (Pinch Gauge)  Measured in pounds and psi. Average of three trials.    5/16/2024 5/16/2024     Right  Left    Rung II deferred deferred   Key Pinch       3pt Pinch       2pt Pinch             Treatment     Nini received the treatments listed below:     Supervised modalities after being cleared for contradictions:  Fluidotherapy: To L arm for 10 min, continuous air, 115 deg, air speed 50 to decrease pain, edema & scar tissue, sensory re- education, and increased tissue extensibility prior to therex   Manual therapy techniques: Manual Lymphatic Drainage and Soft tissue Mobilization were applied to the: left hand, wrist, and forearm for 10 minutes, including:  Pt received soft tissue mobilization to L forearm, wrist, and hand. Gentle scar mobilization over incision.    Therapeutic exercises to develop ROM and flexibility for 20 minutes, including:  AROM   Sup/Pro  Wrist  Ext/flx  RD/UD    X 10 reps each    AROM   Spreads  Wave  Hook  Straight fist  Composite fist  Lifts X 10 reps each   AROM thumb:  Rad abd/add  Palmar abd/add  IP blocks  Opposition  Pinky Slides    X 10 reps each    Wrist wheel, wrist flex/ext, supination/pronation X 2' each   Wrist AROM over wedge, with med non  weighted dowel X 15 reps each         Therapeutic activities to improve functional performance for 10  minutes, including:  -isospheres on table x 3'  -in hand manipulation with large poms x 2 sets  -wrist maze for functional motion x 3'    Neuromuscular re-education activities to improve Coordination, Kinesthetic, and Proprioception for 5 minutes. The following activities were included:  -wrist proprioception with srinivas balance x 3'  -wrist proprioception with tennis ball, alphabet x 1 set    Patient Education and Home Exercises      Education provided:   - reviewed HEP and incision care  - Progress towards goals     Written Home Exercises Provided: Patient instructed to cont prior HEP.  Exercises were reviewed and Nini was able to demonstrate them prior to the end of the session.  Nini demonstrated good  understanding of the HEP provided. See EMR under Patient Instructions for exercises provided during therapy sessions.      ASSESSMENT     Pt presents with improved tolerance to treatment today with decreased aversion to tactile input. Prolonged rest breaks continue to be required however gains in ROM are noted. Plan to progress as tolerated.     Nini is progressing fairly  towards her goals and there are no updates to goals at this time. Pt prognosis is Good.     Pt will continue to benefit from skilled outpatient occupational therapy to address the deficits listed in the problem list on initial evaluation, provide pt/family education and to maximize pt's level of independence in the home and community environment.     Pt's spiritual, cultural and educational needs considered and pt agreeable to plan of care and goals.    Anticipated barriers to occupational therapy: stiffness    Goals:  Long Term Goals (LTGs); to be met by discharge.  1) Pt will report pain no higher than 2/10 with daily tasks and  tasks ---in progress  2) Pt will have an improved FOTO score by at least 20 points---in  progress  3) Pt will demonstrate improved left wrist  AROM WFL for performing daily functional tasks and grasping---in progress  4)Pt will demonstrate improved left forearm AROM WFL for her daily functional activities. ---in progress  5) Pt will report return to prior level of function---in progress  6)  and pinch to be assessed when appropriate and set goals---in progress     Short Term Goals (STGs); to be met within 4 weeks (6/16/24).  1) Pt will report or demonstrate independence with HEP and brace wear---in progress  2) Pt will report pain no higher than 4/10 with daily tasks---in progress  3) Pt will demonstrate improved left wrist and forearm AROM by at least 20 degrees each for improved function and reach---in progress  4) Pt will demonstrate improved left wrist edema by at least 0.3-0.5 cm for increased motion and use. ---in progress    PLAN     Continue skilled occupational therapy with individualized plan of care focusing on improving motion and maximizing functional use of her hand    Updates/Grading for next session: cont to progress per protocol, with AROM    Nini Doty, OT

## 2024-06-03 NOTE — PROGRESS NOTES
[facility]  Occupational Therapy Treatment Note    Date: 6/4/2024  Name: Nini Carter  [facility] Number: [id number]    Therapy Diagnosis:   Encounter Diagnoses   Name Primary?    Decreased range of motion of left wrist Yes    Swelling of left wrist     Left wrist pain        Physician: Russo-Digeorge, Jamie L*    Physician Orders: eval and treat, and custom forearm based wrist cock up orthosis.   Medical Diagnosis: Closed fracture of distal end of left radius, unspecified fracture morphology, initial encounter [S52.502A], Postoperative state [Z98.890]   Surgical Procedure and Date: 5/3/24, Open reduction and internal fixation of left distal radius fracture,   Extra-articular.  Onset Date: 4/30/24  Evaluation Date: 5/16/2024  Insurance Authorization Period Expiration: 5/16/25  Plan of Care Certification Period: 7/26/24  Date of Return to MD: 6/13/24  Visit # / Visits authorized: 5 / 20       Precautions:  Standard and Weightbearing    Time In: 9:45 AM  Time Out: 10:30 AM  Total Billable Time: 45 minutes      SUBJECTIVE     Pt reports: Ulnar sided wrist pain w/ pronation  Response to previous treatment: Lucia well    Pain: 0/10  Location: left wrist    OBJECTIVE   Objective Measures updated at progress report unless specified.    Observation/Appearance:  bruising noted at medial L elbow and volar forearm, palm, and thumb.  steristrips present, healing incision ~7 cm. Mild swelling noted. Significant stiffness with supination. Pt arrived with pre juvenal wrist brace.       FA and Wrist ROM. Measured in degrees.    5/21/2024 6/4/2024   Supination/Pronation -30/78 -5/68   Wrist Ext/Flex 25/12 30/30   Wrist RD/UD       15/5 10/15         Hand ROM. Measured in degrees.    5/16/2024 6/4/2024   Index: MP  60 WNL              PIP     85               DIP 50               TONEY 195    Thumb: MP 28 45                IP 25 50       Rad ADD/ABD 32 40       Pal ADD/ABD 40 45          Opposition To P2 of SF  SF MP          Strength (Dyanmometer) and Pinch Strength (Pinch Gauge)  Measured in pounds and psi. Average of three trials.    6/4/2024 6/4/2024     Right  Left    Rung II 65 20   Key Pinch 15 13   3pt Pinch 13 10         Treatment     Nini received the treatments listed below:     Supervised modalities after being cleared for contradictions:  Fluidotherapy: To L arm for 10 min,     Nini received the following manual therapy techniques for 15 minutes in order to maximize tissue function and/or decrease pain:   -Joint mobs left wrist/FA    Nini participated in dynamic functional therapeutic activities to improve functional performance for 15  minutes, including:  -AROM pro/sup, WE/WF/UD/RD, circles 10  -Pathophysiology review, joint protection education    Patient Education and Home Exercises      Education provided:   - Upgraded HEP  - Progress towards goals     Written Home Exercises Provided: Pt's HEP upgraded.  Exercises were reviewed and Nini was able to demonstrate them prior to the end of the session.  Nini demonstrated good  understanding of the HEP provided. See EMR under Patient Instructions for exercises provided during therapy sessions.      ASSESSMENT     Pt presents with improved tolerance to treatment today with decreased aversion to tactile input. Prolonged rest breaks continue to be required however gains in ROM are noted. Plan to progress as tolerated.     Nini is progressing fairly  towards her goals and there are no updates to goals at this time. Pt prognosis is Good.     Pt will continue to benefit from skilled outpatient occupational therapy to address the deficits listed in the problem list on initial evaluation, provide pt/family education and to maximize pt's level of independence in the home and community environment.     Pt's spiritual, cultural and educational needs considered and pt agreeable to plan of care and goals.    Anticipated barriers to  occupational therapy: stiffness, limited tolerance for therapy    Goals:  Long Term Goals (LTGs); to be met by discharge.  1) Pt will report pain no higher than 2/10 with daily tasks and  tasks ---in progress  2) Pt will have an improved FOTO score by at least 20 points---in progress  3) Pt will demonstrate improved left wrist  AROM WFL for performing daily functional tasks and grasping---in progress  4)Pt will demonstrate improved left forearm AROM WFL for her daily functional activities. ---in progress  5) Pt will report return to prior level of function---in progress  6)  and pinch to be assessed when appropriate and set goals---in progress     Short Term Goals (STGs); to be met within 4 weeks (6/16/24).  1) Pt will report or demonstrate independence with HEP and brace wear---in progress  2) Pt will report pain no higher than 4/10 with daily tasks---in progress  3) Pt will demonstrate improved left wrist and forearm AROM by at least 20 degrees each for improved function and reach---in progress      PLAN     Continue skilled occupational therapy with individualized plan of care focusing on improving motion and maximizing functional use of her hand    Updates/Grading for next session: cont to progress per protocol, with AROM    TEVIN Yang OTR/L, CHT

## 2024-06-04 ENCOUNTER — CLINICAL SUPPORT (OUTPATIENT)
Dept: REHABILITATION | Facility: HOSPITAL | Age: 39
End: 2024-06-04
Payer: COMMERCIAL

## 2024-06-04 DIAGNOSIS — M25.432 SWELLING OF LEFT WRIST: ICD-10-CM

## 2024-06-04 DIAGNOSIS — M25.632 DECREASED RANGE OF MOTION OF LEFT WRIST: Primary | ICD-10-CM

## 2024-06-04 DIAGNOSIS — M25.532 LEFT WRIST PAIN: ICD-10-CM

## 2024-06-04 PROCEDURE — 97530 THERAPEUTIC ACTIVITIES: CPT | Mod: PO

## 2024-06-04 PROCEDURE — 97140 MANUAL THERAPY 1/> REGIONS: CPT | Mod: PO

## 2024-06-04 PROCEDURE — 97022 WHIRLPOOL THERAPY: CPT | Mod: PO

## 2024-06-04 NOTE — PATIENT INSTRUCTIONS
Extension (Passive)        Keep palm on table, using other hand on top to assist. Raise elbow. Hold 10 seconds.  Repeat 10 times. Do 3 sessions per day.  Activity: Resting hand with palm on hip, move elbow out to side.*       Flexion (Passive)        With palm on table near edge, hold steady with other hand on top. Lower elbow. Hold 10 seconds.  Repeat 10 times. Do 3 sessions per day.      Supination (Passive)        Keep elbow bent at right angle and held firmly at side. Use other hand to turn forearm until palm faces upward.  Hold 10 seconds.  Repeat 10 times. Do 1-2 sessions per day.

## 2024-06-10 NOTE — PROGRESS NOTES
[facility]  Occupational Therapy Treatment Note    Date: 6/11/2024  Name: Nini Carter  Clinic Number: [id number]    Therapy Diagnosis:   Encounter Diagnoses   Name Primary?    Decreased range of motion of left wrist Yes    Swelling of left wrist     Left wrist pain          Physician: Russo-Digeorge, Jamie L*    Physician Orders: eval and treat, and custom forearm based wrist cock up orthosis.   Medical Diagnosis: Closed fracture of distal end of left radius, unspecified fracture morphology, initial encounter [S52.502A], Postoperative state [Z98.890]   Surgical Procedure and Date: 5/3/24, Open reduction and internal fixation of left distal radius fracture,   Extra-articular.  Onset Date: 4/30/24  Evaluation Date: 5/16/2024  Insurance Authorization Period Expiration: 5/16/25  Plan of Care Certification Period: 7/26/24  Date of Return to MD: 6/13/24  Visit # / Visits authorized: 4 / 20       Precautions:  Standard and Weightbearing    Time In: 1:45 PM  Time Out: 2:30 PM  Total Billable Time: 45 minutes      SUBJECTIVE     Pt reports: Gradually incorporating her left arm into lifting her child  Response to previous treatment: Lucia well    Pain: 0/10  Location: left wrist    OBJECTIVE         FA and Wrist ROM. Measured in degrees.    5/21/2024 6/4/2024   Supination/Pronation -30/78 -5/68   Wrist Ext/Flex 25/12 30/30   Wrist RD/UD       15/5 10/15         Hand ROM. Measured in degrees.    5/16/2024 6/4/2024   Index: MP  60 WNL              PIP     85               DIP 50               TONEY 195    Thumb: MP 28 45                IP 25 50       Rad ADD/ABD 32 40       Pal ADD/ABD 40 45          Opposition To P2 of SF SF MP          Strength (Dyanmometer) and Pinch Strength (Pinch Gauge)  Measured in pounds and psi. Average of three trials.    6/4/2024 6/4/2024     Right  Left    Rung II 65 20   Key Pinch 15 13   3pt Pinch 13 10       Sup 27 today      Treatment     Nini received the  treatments listed below:     Supervised modalities after being cleared for contradictions:  Fluidotherapy: To L arm for 10 min,     Nini received the following manual therapy techniques for 15 minutes in order to maximize tissue function and/or decrease pain:   -Joint mobs left wrist/FA    Nini participated in dynamic functional therapeutic activities to improve functional performance for 15  minutes, including:  -AROM pro/sup, WE/WF/UD/RD, circles 10  -A/AROM sup 10  -Juxacisor 3'  -Yellow putty tooling #3, 3'      Patient Education and Home Exercises      Education provided:   - Upgraded HEP  - Progress towards goals     Written Home Exercises Provided: Pt's HEP upgraded.  Exercises were reviewed and Nini was able to demonstrate them prior to the end of the session.  Nini demonstrated good  understanding of the HEP provided. See EMR under Patient Instructions for exercises provided during therapy sessions.      ASSESSMENT     Pt presents with significantly improved supination and activity tolerance. She is more confident in the use of her left hand for activities during the day and using it more as a result.    Nini is progressing well towards her goals and there are no updates to goals at this time. Pt prognosis is Good.     Pt will continue to benefit from skilled outpatient occupational therapy to address the deficits listed in the problem list on initial evaluation, provide pt/family education and to maximize pt's level of independence in the home and community environment.     Pt's spiritual, cultural and educational needs considered and pt agreeable to plan of care and goals.    Anticipated barriers to occupational therapy: stiffness, limited tolerance for therapy    Goals:  Long Term Goals (LTGs); to be met by discharge.  1) Pt will report pain no higher than 2/10 with daily tasks and  tasks ---in progress  2) Pt will have an improved FOTO score by at least 20 points---in  progress  3) Pt will demonstrate improved left wrist  AROM WFL for performing daily functional tasks and grasping---in progress  4) Pt will demonstrate improved left forearm AROM WFL for her daily functional activities. ---in progress  5) Pt will report return to prior level of function---in progress  6) Pt will increase  strength to 45 lbs to grasp pot handle---in progress       Short Term Goals (STGs); to be met within 4 weeks (6/16/24).  1) Pt will report or demonstrate independence with HEP and brace wear---Met  2) Pt will report pain no higher than 4/10 with daily tasks---in progress  3) Pt will demonstrate improved left wrist and forearm AROM by at least 20 degrees each for improved function and reach---Met      PLAN     Continue skilled occupational therapy with individualized plan of care focusing on improving motion and maximizing functional use of her hand    Updates/Grading for next session: cont to progress per protocol, with AROM    TEVIN Yang OTR/AUGUST[place], CHT

## 2024-06-11 ENCOUNTER — CLINICAL SUPPORT (OUTPATIENT)
Dept: REHABILITATION | Facility: HOSPITAL | Age: 39
End: 2024-06-11
Payer: COMMERCIAL

## 2024-06-11 DIAGNOSIS — M25.432 SWELLING OF LEFT WRIST: ICD-10-CM

## 2024-06-11 DIAGNOSIS — M25.532 LEFT WRIST PAIN: ICD-10-CM

## 2024-06-11 DIAGNOSIS — M25.632 DECREASED RANGE OF MOTION OF LEFT WRIST: Primary | ICD-10-CM

## 2024-06-11 PROCEDURE — 97022 WHIRLPOOL THERAPY: CPT | Mod: PO

## 2024-06-11 PROCEDURE — 97140 MANUAL THERAPY 1/> REGIONS: CPT | Mod: PO

## 2024-06-11 PROCEDURE — 97530 THERAPEUTIC ACTIVITIES: CPT | Mod: PO

## 2024-06-13 ENCOUNTER — OFFICE VISIT (OUTPATIENT)
Dept: ORTHOPEDICS | Facility: CLINIC | Age: 39
End: 2024-06-13
Payer: COMMERCIAL

## 2024-06-13 ENCOUNTER — HOSPITAL ENCOUNTER (OUTPATIENT)
Dept: RADIOLOGY | Facility: HOSPITAL | Age: 39
Discharge: HOME | End: 2024-06-13
Attending: ORTHOPAEDIC SURGERY
Payer: COMMERCIAL

## 2024-06-13 VITALS — WEIGHT: 128.06 LBS | BODY MASS INDEX: 20.1 KG/M2 | HEIGHT: 67 IN

## 2024-06-13 DIAGNOSIS — S52.502A CLOSED FRACTURE OF DISTAL END OF LEFT RADIUS, UNSPECIFIED FRACTURE MORPHOLOGY, INITIAL ENCOUNTER: Primary | ICD-10-CM

## 2024-06-13 DIAGNOSIS — S52.502A CLOSED FRACTURE OF DISTAL END OF LEFT RADIUS, UNSPECIFIED FRACTURE MORPHOLOGY, INITIAL ENCOUNTER: ICD-10-CM

## 2024-06-13 PROCEDURE — 1159F MED LIST DOCD IN RCRD: CPT | Mod: CPTII,S$GLB,, | Performed by: ORTHOPAEDIC SURGERY

## 2024-06-13 PROCEDURE — 99024 POSTOP FOLLOW-UP VISIT: CPT | Mod: S$GLB,,, | Performed by: ORTHOPAEDIC SURGERY

## 2024-06-13 PROCEDURE — 99999 PR PBB SHADOW E&M-EST. PATIENT-LVL III: CPT | Mod: PBBFAC,,, | Performed by: ORTHOPAEDIC SURGERY

## 2024-06-13 PROCEDURE — 73110 X-RAY EXAM OF WRIST: CPT | Mod: 26,LT,, | Performed by: RADIOLOGY

## 2024-06-13 PROCEDURE — 73110 X-RAY EXAM OF WRIST: CPT | Mod: TC,LT

## 2024-06-13 NOTE — PROGRESS NOTES
Nini Carter presents for post-operative evaluation.  The patient is now 6 weeks s/p Left ORIF distal radius fracture with Dr. Hughes on 5/3/24.  Overall the patient reports doing well.  She reports 2/10 pain off/on, denies any f/c/s. She is not taking any medications for this.    PE:    AA&O x 4.  NAD  HEENT:  NCAT, sclera nonicteric  Lungs:  Respirations are equal and unlabored.  CV:  2+ bilateral upper and lower extremity pulses.  MSK: The wound is healing well with no signs of erythema or warmth.  There is no drainage.  No clinical signs or symptoms of infection are present.  She can make a full composite fist without difficulty and this is painless today.  Left upper extremity neurovascularly intact.  Wrist flexion and extension is 30 each and painless.  Pronation is to 70 and painless.  Supination is to 25 and stiff beyond that. SILT. DNVI.    IMAGING - reviewed with patient  Stable appearance ORIF distal radius, no hardware complications; healing nicely.    A/P: Status post above, doing well  1) at this time, Nini has turned the corner and she is doing much better.  She may discontinue her brace completely and begin lifting as tolerated.  Aggressive range of motion as tolerated.  Stretching as tolerated.  No restrictions.  Follow up in 6 weeks with new x-rays or sooner for any problems

## 2024-06-14 ENCOUNTER — CLINICAL SUPPORT (OUTPATIENT)
Dept: REHABILITATION | Facility: HOSPITAL | Age: 39
End: 2024-06-14
Payer: COMMERCIAL

## 2024-06-14 DIAGNOSIS — M25.432 SWELLING OF LEFT WRIST: ICD-10-CM

## 2024-06-14 DIAGNOSIS — M25.532 LEFT WRIST PAIN: ICD-10-CM

## 2024-06-14 DIAGNOSIS — M25.632 DECREASED RANGE OF MOTION OF LEFT WRIST: Primary | ICD-10-CM

## 2024-06-14 PROCEDURE — 97530 THERAPEUTIC ACTIVITIES: CPT | Mod: PO

## 2024-06-14 PROCEDURE — 97110 THERAPEUTIC EXERCISES: CPT | Mod: PO

## 2024-06-14 NOTE — PROGRESS NOTES
[facility]  Occupational Therapy Treatment Note    Date: 6/14/2024  Name: Nini Carter  Clinic Number: [id number]    Therapy Diagnosis:   Encounter Diagnoses   Name Primary?    Decreased range of motion of left wrist Yes    Swelling of left wrist     Left wrist pain          Physician: Russo-Digeorge, Jamie L*    Physician Orders: eval and treat, and custom forearm based wrist cock up orthosis.   Medical Diagnosis: Closed fracture of distal end of left radius, unspecified fracture morphology, initial encounter [S52.502A], Postoperative state [Z98.890]   Surgical Procedure and Date: 5/3/24, Open reduction and internal fixation of left distal radius fracture,   Extra-articular.  Onset Date: 4/30/24  Evaluation Date: 5/16/2024  Insurance Authorization Period Expiration: 5/16/25  Plan of Care Certification Period: 7/26/24  Date of Return to MD: 6/13/24  Visit # / Visits authorized: 5 / 20       Precautions:  Standard and Weightbearing    Time In: 9 AM  Time Out: 9:45 AM  Total Billable Time: 45 minutes      SUBJECTIVE     Pt reports: More motion     Response to previous treatment: Lucia well    Pain: 0/10  Location: left wrist    OBJECTIVE         FA and Wrist ROM. Measured in degrees.    5/21/2024 6/4/2024   Supination/Pronation -30/78 -5/68   Wrist Ext/Flex 25/12 30/30   Wrist RD/UD       15/5 10/15         Hand ROM. Measured in degrees.    5/16/2024 6/4/2024   Index: MP  60 WNL              PIP     85               DIP 50               TONEY 195    Thumb: MP 28 45                IP 25 50       Rad ADD/ABD 32 40       Pal ADD/ABD 40 45          Opposition To P2 of SF SF MP          Strength (Dyanmometer) and Pinch Strength (Pinch Gauge)  Measured in pounds and psi. Average of three trials.    6/4/2024 6/4/2024     Right  Left    Rung II 65 20   Key Pinch 15 13   3pt Pinch 13 10       Sup 27 today      Treatment     Nini received the treatments listed below:     Supervised  modalities after being cleared for contradictions:  Fluidotherapy: To L arm for 12 min,     Nini received the following manual therapy techniques for 15 minutes in order to maximize tissue function and/or decrease pain:   - LLPS in wrist flexion x 3 minutes with     Nini participated in dynamic functional therapeutic activities to improve functional performance for 15  minutes, including:  -AROM pro/sup, WE/WF/UD/RD, circles 1 min each , fisted  -A/AROM sup 10  -Juxacisor 3'  -LLPS in supination x 5 minutes   - Patient performed desensitization with pillow case  and towel x 2 minutes a for nerve re-education and sensory discrimination.       Patient Education and Home Exercises      Education provided:   - Upgraded HEP  - Progress towards goals     Written Home Exercises Provided: Pt's HEP upgraded.  Exercises were reviewed and Nini was able to demonstrate them prior to the end of the session.  Nini demonstrated good  understanding of the HEP provided. See EMR under Patient Instructions for exercises provided during therapy sessions.      ASSESSMENT     Pt increasing supination. Supplied  desensitization  sheet for scar    Nini is progressing well towards her goals and there are no updates to goals at this time. Pt prognosis is Good.     Pt will continue to benefit from skilled outpatient occupational therapy to address the deficits listed in the problem list on initial evaluation, provide pt/family education and to maximize pt's level of independence in the home and community environment.     Pt's spiritual, cultural and educational needs considered and pt agreeable to plan of care and goals.    Anticipated barriers to occupational therapy: stiffness, limited tolerance for therapy    Goals:  Long Term Goals (LTGs); to be met by discharge.  1) Pt will report pain no higher than 2/10 with daily tasks and  tasks ---in progress  2) Pt will have an improved FOTO score by at least 20  points---in progress  3) Pt will demonstrate improved left wrist  AROM WFL for performing daily functional tasks and grasping---in progress  4) Pt will demonstrate improved left forearm AROM WFL for her daily functional activities. ---in progress  5) Pt will report return to prior level of function---in progress  6) Pt will increase  strength to 45 lbs to grasp pot handle---in progress       Short Term Goals (STGs); to be met within 4 weeks (6/16/24).  1) Pt will report or demonstrate independence with HEP and brace wear---Met  2) Pt will report pain no higher than 4/10 with daily tasks---in progress  3) Pt will demonstrate improved left wrist and forearm AROM by at least 20 degrees each for improved function and reach---Met      PLAN   Plan of Care Certification Period: 7/26/24  Continue skilled occupational therapy with individualized plan of care focusing on improving motion and maximizing functional use of her hand    Updates/Grading for next session: cont to progress per protocol, with AROM

## 2024-06-14 NOTE — PATIENT INSTRUCTIONS
Desensitization Home Program  Perform these activities 5 times a day 4 minutes each:    Massage the hypersensitive areas using your thumb. Start with light pressure and gradually increase pressure going in circles.    Massage hypersensitive areas with a scar massager. If it hurts too much to use the massager directly on your skin, put a towel down first and then the massager on top of that. Eventually, try to use the massager without a towel.       Rub the hypersensitive areas with different textures of fabric and materials that you have around the house and at work.  *Cotton  *Wool   *Velvet  *Velcro  *Yehuda Material *Dish cloths  *Donald Cloths *Scrub Brush *Corduroy  *Flannel  *Felt  (Gradually increase how fast you do this and how hard)    Fill an empty container with one or any of the following:      *Beans  *Macaroni  *Rice  *Marbles      *Sand   *Karma  *Popcorn Kernels  Put your hand into the container of the texture and move it around.     Tap the area that is sensitive. You can use the end of a toothbrush or a pencil eraser to tap the area that hurts. Tap the sensitive areas on the table or other surfaces of other objects. You can start with several layers of towels and decrease the number of towels until you can stand to tap without padding. You can also tap objects like sponges and cloths.        Clinic  Information:     - If you have any concerns with your schedule, please contact [phone]. I do not have any capability to make or cancel your appointments.    - You are allowed 2 no show visits, then we remove you from the schedule to allow the needs for other patients to attend therapy.     - Children  are NOT allowed in treatment areas, unless the child is the patient. Please find childcare prior to attending your therapy session.

## 2024-06-19 NOTE — PROGRESS NOTES
[facility]  Occupational Therapy Treatment Note    Date: 6/20/2024  Name: Nini Carter  [facility] Number: [id number]    Therapy Diagnosis:   No diagnosis found.        Physician: Russo-Digeorge, Jamie L*    Physician Orders: eval and treat, and custom forearm based wrist cock up orthosis.   Medical Diagnosis: Closed fracture of distal end of left radius, unspecified fracture morphology, initial encounter [S52.502A], Postoperative state [Z98.890]   Surgical Procedure and Date: 5/3/24, Open reduction and internal fixation of left distal radius fracture,   Extra-articular.  Onset Date: 4/30/24  Evaluation Date: 5/16/2024  Insurance Authorization Period Expiration: 5/16/25  Plan of Care Certification Period: 7/26/24  Date of Return to MD: 6/13/24  Visit # / Visits authorized: 6 / 20       Precautions:  Standard and Weightbearing    Time In: 9:30 AM  Time Out: 10:15 AM  Total Billable Time: 45 minutes      SUBJECTIVE     Pt reports: Gradually incorporating her left arm into lifting her child  Response to previous treatment: Lucia well    Pain: 0/10  Location: left wrist    OBJECTIVE         FA and Wrist ROM. Measured in degrees.    5/21/2024 6/4/2024 6/20/2024   Supination/Pronation -30/78 -5/68 43/71   Wrist Ext/Flex 25/12 30/30 43/35   Wrist RD/UD       15/5 10/15 15/15         Hand ROM. Measured in degrees.    5/16/2024 6/4/2024 6/20/2024   Index: MP  60 WNL WNL              PIP     85  All              DIP 50  Joint               TONEY 195  Planes   Thumb: MP 28 45 I                IP 25 50 I       Rad ADD/ABD 32 40 I       Pal ADD/ABD 40 45 I          Opposition To P2 of SF SF MP I          Strength (Dyanmometer) and Pinch Strength (Pinch Gauge)  Measured in pounds and psi. Average of three trials.    6/4/2024 6/4/2024 6/20/2024     Right  Left  Left   Rung II 65 20 35   Zamorano Pinch 15 13 13   3pt Pinch 13 10 13           Treatment     Nini received the treatments listed below:      Supervised modalities after being cleared for contradictions:  Fluidotherapy: To L arm for 10 min,     Nini received the following manual therapy techniques for 15 minutes in order to maximize tissue function and/or decrease pain:   -Joint mobs left wrist/FA    Nini participated in dynamic functional therapeutic activities to improve functional performance for 15  minutes, including:  -AROM pro/sup, WE/WF/UD/RD, circles 10  -A/AROM sup 10  -Juxacisor 3'  -Red flexbar frowns/smiles 2x10  -Yellow putty squeezes 10, 2P/lat pinches 2 logs    Patient Education and Home Exercises      Education provided:   - Upgraded HEP  - Progress towards goals     Written Home Exercises Provided: Pt's HEP upgraded.  Exercises were reviewed and Nini was able to demonstrate them prior to the end of the session.  Nini demonstrated good  understanding of the HEP provided. See EMR under Patient Instructions for exercises provided during therapy sessions.      ASSESSMENT     Pt presents with continued improvements in ROM, strength, and function.    Nini is progressing well towards her goals and there are no updates to goals at this time. Pt prognosis is Good.     Pt will continue to benefit from skilled outpatient occupational therapy to address the deficits listed in the problem list on initial evaluation, provide pt/family education and to maximize pt's level of independence in the home and community environment.     Pt's spiritual, cultural and educational needs considered and pt agreeable to plan of care and goals.    Anticipated barriers to occupational therapy: stiffness, limited tolerance for therapy    Goals:  Long Term Goals (LTGs); to be met by discharge.  1) Pt will report pain no higher than 2/10 with daily tasks and  tasks ---in progress  2) Pt will demonstrate improved left wrist  AROM WFL for performing daily functional tasks and grasping---in progress  3) Pt will demonstrate improved  left forearm AROM WFL for her daily functional activities. ---in progress  4) Pt will report return to prior level of function---in progress  5) Pt will increase  strength to 45 lbs to grasp pot handle---in progress       Short Term Goals (STGs); to be met within 4 weeks (6/16/24).  1) Pt will report or demonstrate independence with HEP and brace wear---Met  2) Pt will report pain no higher than 4/10 with daily tasks---in progress  3) Pt will demonstrate improved left wrist and forearm AROM by at least 20 degrees each for improved function and reach---Met      PLAN     Continue skilled occupational therapy with individualized plan of care focusing on improving motion and maximizing functional use of her hand    Updates/Grading for next session: cont to progress per protocol, with BECKY Yang OTR/L, CHT

## 2024-06-20 ENCOUNTER — CLINICAL SUPPORT (OUTPATIENT)
Dept: REHABILITATION | Facility: HOSPITAL | Age: 39
End: 2024-06-20
Payer: COMMERCIAL

## 2024-06-20 DIAGNOSIS — M25.432 SWELLING OF LEFT WRIST: ICD-10-CM

## 2024-06-20 DIAGNOSIS — M25.532 LEFT WRIST PAIN: ICD-10-CM

## 2024-06-20 DIAGNOSIS — M25.632 DECREASED RANGE OF MOTION OF LEFT WRIST: Primary | ICD-10-CM

## 2024-06-20 PROCEDURE — 97530 THERAPEUTIC ACTIVITIES: CPT | Mod: PO

## 2024-06-20 PROCEDURE — 97022 WHIRLPOOL THERAPY: CPT | Mod: PO

## 2024-06-20 PROCEDURE — 97140 MANUAL THERAPY 1/> REGIONS: CPT | Mod: PO

## 2024-06-20 NOTE — PATIENT INSTRUCTIONS
Strengthening (Resistive Putty)        Squeeze putty using thumb and all fingers.  Repeat 10-20 times. Do 1 sessions per day.      Palmar Pinch Strengthening (Resistive Putty)        Pinch putty between thumb and index fingertip.  Repeat 2-3 logs. Do 1 session per day.       Lateral Pinch Strengthening (Resistive Putty)        Squeeze between thumb and side of index finger.  Repeat 2-3 logs. Do 1 session per day.

## 2024-06-21 NOTE — PROGRESS NOTES
[facility]  Occupational Therapy Treatment Note    Date: 6/24/2024  Name: Nini Carter  [facility] Number: [id number]    Therapy Diagnosis:   Encounter Diagnoses   Name Primary?    Decreased range of motion of left wrist Yes    Swelling of left wrist     Left wrist pain            Physician: Russo-Digeorge, Jamie L*    Physician Orders: eval and treat, and custom forearm based wrist cock up orthosis.   Medical Diagnosis: Closed fracture of distal end of left radius, unspecified fracture morphology, initial encounter [S52.502A], Postoperative state [Z98.890]   Surgical Procedure and Date: 5/3/24, Open reduction and internal fixation of left distal radius fracture,   Extra-articular.  Onset Date: 4/30/24  Evaluation Date: 5/16/2024  Insurance Authorization Period Expiration: 5/16/25  Plan of Care Certification Period: 8/26/24  Date of Return to MD: 7/16/24  Visit # / Visits authorized: 7 / 20       Precautions:  Standard and Weightbearing    Time In: 9:00 AM  Time Out: 9:45 AM  Total Billable Time: 45 minutes      SUBJECTIVE     Pt reports: Continues to be increase LUE use  Response to previous treatment: Lucia well    Pain: 0/10, 5/10 at worst  Location: left wrist    OBJECTIVE         FA and Wrist ROM. Measured in degrees.    5/21/2024 6/4/2024 6/20/2024   Supination/Pronation -30/78 -5/68 43/71   Wrist Ext/Flex 25/12 30/30 43/35   Wrist RD/UD       15/5 10/15 15/15         Hand ROM. Measured in degrees.    5/16/2024 6/4/2024 6/20/2024   Index: MP  60 WNL WNL              PIP     85  All              DIP 50  Joint               TONEY 195  Planes   Thumb: MP 28 45 I                IP 25 50 I       Rad ADD/ABD 32 40 I       Pal ADD/ABD 40 45 I          Opposition To P2 of SF SF MP I          Strength (Dyanmometer) and Pinch Strength (Pinch Gauge)  Measured in pounds and psi. Average of three trials.    6/4/2024 6/4/2024 6/20/2024     Right  Left  Left   Rung II 65 20 35   Zamorano Pinch 15 13  13   3pt Pinch 13 10 13           Treatment     Nini received the treatments listed below:     Supervised modalities after being cleared for contradictions:  Fluidotherapy: To L arm for 10 min,     Nini received the following manual therapy techniques for 20 minutes in order to maximize tissue function and/or decrease pain:   -Joint mobs/PROM left wrist/FA  -VM volar wrist    Nini participated in dynamic functional therapeutic activities to improve functional performance for 10  minutes, including:  -AROM pro/sup, WE/WF/UD/RD, circles 10  -A/AROM sup 10  -Juxacisor 3'    Patient Education and Home Exercises      Education provided:   - Upgraded HEP  - Progress towards goals     Written Home Exercises Provided: Pt's HEP upgraded.  Exercises were reviewed and Nini was able to demonstrate them prior to the end of the session.  Nini demonstrated good  understanding of the HEP provided. See EMR under Patient Instructions for exercises provided during therapy sessions.      ASSESSMENT     Pt presents with continued improvements in ROM, strength, and function.    Nini is progressing well towards her goals and there are no updates to goals at this time. Pt prognosis is Good.     Pt will continue to benefit from skilled outpatient occupational therapy to address the deficits listed in the problem list on initial evaluation, provide pt/family education and to maximize pt's level of independence in the home and community environment.     Pt's spiritual, cultural and educational needs considered and pt agreeable to plan of care and goals.    Anticipated barriers to occupational therapy: stiffness, limited tolerance for therapy    Goals:  Long Term Goals (LTGs); to be met by discharge.  1) Pt will report pain no higher than 2/10 with daily tasks and  tasks ---in progress  2) Pt will demonstrate improved left wrist  AROM WFL for performing daily functional tasks and grasping---in  progress  3) Pt will demonstrate improved left forearm AROM WFL for her daily functional activities. ---in progress  4) Pt will report return to prior level of function---in progress  5) Pt will increase  strength to 45 lbs to grasp pot handle---in progress       Short Term Goals (STGs); to be met within 4 weeks (6/16/24).  1) Pt will report or demonstrate independence with HEP and brace wear---Met  2) Pt will report pain no higher than 4/10 with daily tasks---in progress  3) Pt will demonstrate improved left wrist and forearm AROM by at least 20 degrees each for improved function and reach---Met      PLAN     Continue skilled occupational therapy with individualized plan of care focusing on improving motion and maximizing functional use of her hand    Updates/Grading for next session: cont to progress per protocol, with AROM    TEVIN Yang OTR/L, CHT

## 2024-06-24 ENCOUNTER — CLINICAL SUPPORT (OUTPATIENT)
Dept: REHABILITATION | Facility: HOSPITAL | Age: 39
End: 2024-06-24
Payer: COMMERCIAL

## 2024-06-24 DIAGNOSIS — M25.632 DECREASED RANGE OF MOTION OF LEFT WRIST: Primary | ICD-10-CM

## 2024-06-24 DIAGNOSIS — M25.432 SWELLING OF LEFT WRIST: ICD-10-CM

## 2024-06-24 DIAGNOSIS — M25.532 LEFT WRIST PAIN: ICD-10-CM

## 2024-06-24 PROCEDURE — 97530 THERAPEUTIC ACTIVITIES: CPT | Mod: PO

## 2024-06-24 PROCEDURE — 97022 WHIRLPOOL THERAPY: CPT | Mod: PO

## 2024-06-24 PROCEDURE — 97140 MANUAL THERAPY 1/> REGIONS: CPT | Mod: PO

## 2024-06-26 NOTE — PROGRESS NOTES
[facility]  Occupational Therapy Treatment Note    Date: 6/27/2024  Name: Nini Carter  [facility] Number: [id number]    Therapy Diagnosis:   Encounter Diagnoses   Name Primary?    Decreased range of motion of left wrist Yes    Swelling of left wrist     Left wrist pain              Physician: Russo-Digeorge, Jamie L*    Physician Orders: eval and treat, and custom forearm based wrist cock up orthosis.   Medical Diagnosis: Closed fracture of distal end of left radius, unspecified fracture morphology, initial encounter [S52.502A], Postoperative state [Z98.890]   Surgical Procedure and Date: 5/3/24, Open reduction and internal fixation of left distal radius fracture,   Extra-articular.  Onset Date: 4/30/24  Evaluation Date: 5/16/2024  Insurance Authorization Period Expiration: 5/16/25  Plan of Care Certification Period: 8/26/24  Date of Return to MD: 7/16/24  Visit # / Visits authorized: 8 / 20       Precautions:  Standard and Weightbearing    Time In: 9:30 AM  Time Out: 10:15 AM  Total Billable Time: 45 minutes      SUBJECTIVE     Pt reports: Continues to be increase LUE use  Response to previous treatment: Lucia well    Pain: 0/10, 5/10 at worst  Location: left wrist    OBJECTIVE         FA and Wrist ROM. Measured in degrees.    5/21/2024 6/4/2024 6/20/2024   Supination/Pronation -30/78 -5/68 43/71   Wrist Ext/Flex 25/12 30/30 43/35   Wrist RD/UD       15/5 10/15 15/15         Hand ROM. Measured in degrees.    5/16/2024 6/4/2024 6/20/2024   Index: MP  60 WNL WNL              PIP     85  All              DIP 50  Joint               TONEY 195  Planes   Thumb: MP 28 45 I                IP 25 50 I       Rad ADD/ABD 32 40 I       Pal ADD/ABD 40 45 I          Opposition To P2 of SF SF MP I          Strength (Dyanmometer) and Pinch Strength (Pinch Gauge)  Measured in pounds and psi. Average of three trials.    6/4/2024 6/4/2024 6/20/2024     Right  Left  Left   Rung II 65 20 35   Zamorano Pinch 15  13 13   3pt Pinch 13 10 13           Treatment     Nini received the treatments listed below:     Supervised modalities after being cleared for contradictions:  Fluidotherapy: To L arm for 10 min,     Nini received the following manual therapy techniques for 20 minutes in order to maximize tissue function and/or decrease pain:   -Joint mobs/PROM left wrist/FA  -VM volar wrist    Nini participated in dynamic functional therapeutic activities to improve functional performance for 10  minutes, including:  -AROM pro/sup, WE/WF/UD/RD, circles 10  -A/AROM sup, WE 10      Patient Education and Home Exercises      Education provided:   - Upgraded HEP  - Progress towards goals     Written Home Exercises Provided: Pt's HEP upgraded.  Exercises were reviewed and Nini was able to demonstrate them prior to the end of the session.  Nini demonstrated good  understanding of the HEP provided. See EMR under Patient Instructions for exercises provided during therapy sessions.      ASSESSMENT     Pt presents with continued improvements in ROM, strength, and function.    Nini is progressing well towards her goals and there are no updates to goals at this time. Pt prognosis is Good.     Pt will continue to benefit from skilled outpatient occupational therapy to address the deficits listed in the problem list on initial evaluation, provide pt/family education and to maximize pt's level of independence in the home and community environment.     Pt's spiritual, cultural and educational needs considered and pt agreeable to plan of care and goals.    Anticipated barriers to occupational therapy: stiffness, limited tolerance for therapy    Goals:  Long Term Goals (LTGs); to be met by discharge.  1) Pt will report pain no higher than 2/10 with daily tasks and  tasks ---in progress  2) Pt will demonstrate improved left wrist  AROM WFL for performing daily functional tasks and grasping---in progress  3) Pt  will demonstrate improved left forearm AROM WFL for her daily functional activities. ---in progress  4) Pt will report return to prior level of function---in progress  5) Pt will increase  strength to 45 lbs to grasp pot handle---in progress       Short Term Goals (STGs); to be met within 4 weeks (6/16/24).  1) Pt will report or demonstrate independence with HEP and brace wear---Met  2) Pt will report pain no higher than 4/10 with daily tasks---in progress  3) Pt will demonstrate improved left wrist and forearm AROM by at least 20 degrees each for improved function and reach---Met      PLAN     Continue skilled occupational therapy with individualized plan of care focusing on improving motion and maximizing functional use of her hand    Updates/Grading for next session: cont to progress per protocol, with AROM    TEVIN Yang OTR/AUGUSTA, CHT

## 2024-06-27 ENCOUNTER — CLINICAL SUPPORT (OUTPATIENT)
Dept: REHABILITATION | Facility: HOSPITAL | Age: 39
End: 2024-06-27
Payer: COMMERCIAL

## 2024-06-27 DIAGNOSIS — M25.432 SWELLING OF LEFT WRIST: ICD-10-CM

## 2024-06-27 DIAGNOSIS — M25.532 LEFT WRIST PAIN: ICD-10-CM

## 2024-06-27 DIAGNOSIS — M25.632 DECREASED RANGE OF MOTION OF LEFT WRIST: Primary | ICD-10-CM

## 2024-06-27 PROCEDURE — 97140 MANUAL THERAPY 1/> REGIONS: CPT | Mod: PO

## 2024-06-27 PROCEDURE — 97022 WHIRLPOOL THERAPY: CPT | Mod: PO

## 2024-06-27 PROCEDURE — 97530 THERAPEUTIC ACTIVITIES: CPT | Mod: PO

## 2024-06-28 NOTE — PROGRESS NOTES
[facility]  Occupational Therapy Treatment Note    Date: 7/1/2024  Name: Nini Carter  [facility] Number: [id number]    Therapy Diagnosis:   Encounter Diagnoses   Name Primary?    Decreased range of motion of left wrist Yes    Swelling of left wrist     Left wrist pain                Physician: Russo-Digeorge, Jamie L*    Physician Orders: eval and treat, and custom forearm based wrist cock up orthosis.   Medical Diagnosis: Closed fracture of distal end of left radius, unspecified fracture morphology, initial encounter [S52.502A], Postoperative state [Z98.890]   Surgical Procedure and Date: 5/3/24, Open reduction and internal fixation of left distal radius fracture,   Extra-articular.  Onset Date: 4/30/24  Evaluation Date: 5/16/2024  Insurance Authorization Period Expiration: 5/16/25  Plan of Care Certification Period: 8/26/24  Date of Return to MD: 7/16/24  Visit # / Visits authorized: 9 / 20       Precautions:  Standard and Weightbearing    Time In: 9:00 AM  Time Out: 9:45 AM  Total Billable Time: 45 minutes      SUBJECTIVE     Pt reports: Continues to increase LUE use  Response to previous treatment: Lucia well    Pain: 0/10, 5/10 at worst  Location: left wrist    OBJECTIVE         FA and Wrist ROM. Measured in degrees.    5/21/2024 6/4/2024 6/20/2024 7/1/2024   Supination/Pronation -30/78 -5/68 43/71 70/70   Wrist Ext/Flex 25/12 30/30 43/35 52/46   Wrist RD/UD       15/5 10/15 15/15 15/11         Hand ROM. Measured in degrees.    5/16/2024 6/4/2024 6/20/2024   Index: MP  60 WNL WNL              PIP     85  All              DIP 50  Joint               TONEY 195  Planes   Thumb: MP 28 45 I                IP 25 50 I       Rad ADD/ABD 32 40 I       Pal ADD/ABD 40 45 I          Opposition To P2 of SF SF MP I          Strength (Dyanmometer) and Pinch Strength (Pinch Gauge)  Measured in pounds and psi. Average of three trials.    6/4/2024 6/4/2024 6/20/2024 7/1/2024     Right  Left  Left  Left   Rung II 65 20 35 40   Zamorano Pinch 15 13 13 13   3pt Pinch 13 10 13 14           Treatment     Nini received the treatments listed below:     Supervised modalities after being cleared for contradictions:  Fluidotherapy: To L arm for 10 min,     Nini received the following manual therapy techniques for 20 minutes in order to maximize tissue function and/or decrease pain:   -Joint mobs/PROM left wrist/FA  -Myofascial scraping dorsal wrist     Nini participated in dynamic functional therapeutic activities to improve functional performance for 10  minutes, including:  -Juxacisor 3'  -Tabletop PROM WF 10 count x 15      Patient Education and Home Exercises      Education provided:   - Upgraded HEP  - Progress towards goals     Written Home Exercises Provided: Pt's HEP upgraded.  Exercises were reviewed and Nini was able to demonstrate them prior to the end of the session.  Nini demonstrated good  understanding of the HEP provided. See EMR under Patient Instructions for exercises provided during therapy sessions.      ASSESSMENT     Pt presents with significantly improved supination, more modest gains in other areas.    Nini is progressing well towards her goals and there are no updates to goals at this time. Pt prognosis is Good.     Pt will continue to benefit from skilled outpatient occupational therapy to address the deficits listed in the problem list on initial evaluation, provide pt/family education and to maximize pt's level of independence in the home and community environment.     Pt's spiritual, cultural and educational needs considered and pt agreeable to plan of care and goals.    Anticipated barriers to occupational therapy: stiffness, limited tolerance for therapy    Goals:  Long Term Goals (LTGs); to be met by discharge.  1) Pt will report pain no higher than 2/10 with daily tasks and  tasks ---in progress  2) Pt will demonstrate improved left wrist  AROM WFL for  performing daily functional tasks and grasping---in progress  3) Pt will demonstrate improved left forearm AROM WFL for her daily functional activities. ---in progress  4) Pt will report return to prior level of function---in progress  5) Pt will increase  strength to 45 lbs to grasp pot handle---in progress       Short Term Goals (STGs); to be met within 4 weeks (6/16/24).  1) Pt will report or demonstrate independence with HEP and brace wear---Met  2) Pt will report pain no higher than 4/10 with daily tasks---Met  3) Pt will demonstrate improved left wrist and forearm AROM by at least 20 degrees each for improved function and reach---Met      PLAN     Continue skilled occupational therapy with individualized plan of care focusing on improving motion and maximizing functional use of her hand    Updates/Grading for next session: cont to progress per protocol, with AROM    TEVIN Yang OTR/L, CHT

## 2024-07-01 ENCOUNTER — CLINICAL SUPPORT (OUTPATIENT)
Dept: REHABILITATION | Facility: HOSPITAL | Age: 39
End: 2024-07-01
Payer: COMMERCIAL

## 2024-07-01 DIAGNOSIS — M25.632 DECREASED RANGE OF MOTION OF LEFT WRIST: Primary | ICD-10-CM

## 2024-07-01 DIAGNOSIS — M25.532 LEFT WRIST PAIN: ICD-10-CM

## 2024-07-01 DIAGNOSIS — M25.432 SWELLING OF LEFT WRIST: ICD-10-CM

## 2024-07-01 PROCEDURE — 97022 WHIRLPOOL THERAPY: CPT | Mod: PO

## 2024-07-01 PROCEDURE — 97530 THERAPEUTIC ACTIVITIES: CPT | Mod: PO

## 2024-07-01 PROCEDURE — 97140 MANUAL THERAPY 1/> REGIONS: CPT | Mod: PO

## 2024-07-02 NOTE — PROGRESS NOTES
[facility]  Occupational Therapy Treatment Note    Date: 7/3/2024  Name: Nini Carter  Clinic Number: [id number]    Therapy Diagnosis:   Encounter Diagnoses   Name Primary?    Decreased range of motion of left wrist Yes    Swelling of left wrist     Left wrist pain          Physician: Russo-Digeorge, Jamie L*    Physician Orders: eval and treat, and custom forearm based wrist cock up orthosis.   Medical Diagnosis: Closed fracture of distal end of left radius, unspecified fracture morphology, initial encounter [S52.502A], Postoperative state [Z98.890]   Surgical Procedure and Date: 5/3/24, Open reduction and internal fixation of left distal radius fracture,   Extra-articular.  Onset Date: 4/30/24  Evaluation Date: 5/16/2024  Insurance Authorization Period Expiration: 5/16/25  Plan of Care Certification Period: 8/26/24  Date of Return to MD: 7/16/24  Visit # / Visits authorized: 10 / 20       Precautions:  Standard and Weightbearing    Time In: 3:00 PM  Time Out: 3:45 PM  Total Billable Time: 45 minutes      SUBJECTIVE     Pt reports: No new c/o  Response to previous treatment: Lucia well    Pain: 0/10, 5/10 at worst  Location: left wrist    OBJECTIVE         FA and Wrist ROM. Measured in degrees.    5/21/2024 6/4/2024 6/20/2024 7/1/2024   Supination/Pronation -30/78 -5/68 43/71 70/70   Wrist Ext/Flex 25/12 30/30 43/35 52/46   Wrist RD/UD       15/5 10/15 15/15 15/11         Hand ROM. Measured in degrees.    5/16/2024 6/4/2024 6/20/2024   Index: MP  60 WNL WNL              PIP     85  All              DIP 50  Joint               TONEY 195  Planes   Thumb: MP 28 45 I                IP 25 50 I       Rad ADD/ABD 32 40 I       Pal ADD/ABD 40 45 I          Opposition To P2 of SF SF MP I          Strength (Dyanmometer) and Pinch Strength (Pinch Gauge)  Measured in pounds and psi. Average of three trials.    6/4/2024 6/4/2024 6/20/2024 7/1/2024     Right  Left  Left Left   Rung II 65 20 35  40   Zamorano Pinch 15 13 13 13   3pt Pinch 13 10 13 14           Treatment     Nini received the treatments listed below:     Supervised modalities after being cleared for contradictions:  Fluidotherapy: To L arm for 10 min,     Nini received the following manual therapy techniques for 10 minutes in order to maximize tissue function and/or decrease pain:   -Joint mobs/PROM left wrist/FA    Nini participated in dynamic functional therapeutic activities to improve functional performance for 20  minutes, including:  -Juxacisor 3'  -Tabletop PROM WF 10 count x 15  -Red flexbar frowns/smiles 3x10  -Green flexbar WE/WF/UD/RD 3x10      Patient Education and Home Exercises      Education provided:   - Upgraded HEP  - Progress towards goals     Written Home Exercises Provided: Pt's HEP upgraded.  Exercises were reviewed and Nini was able to demonstrate them prior to the end of the session.  Nini demonstrated good  understanding of the HEP provided. See EMR under Patient Instructions for exercises provided during therapy sessions.      ASSESSMENT     Pt presents with significantly improved supination, more modest gains in other areas.    Nini is progressing well towards her goals and there are no updates to goals at this time. Pt prognosis is Good.     Pt will continue to benefit from skilled outpatient occupational therapy to address the deficits listed in the problem list on initial evaluation, provide pt/family education and to maximize pt's level of independence in the home and community environment.     Pt's spiritual, cultural and educational needs considered and pt agreeable to plan of care and goals.    Anticipated barriers to occupational therapy: stiffness, limited tolerance for therapy    Goals:  Long Term Goals (LTGs); to be met by discharge.  1) Pt will report pain no higher than 2/10 with daily tasks and  tasks ---in progress  2) Pt will demonstrate improved left wrist  AROM  WFL for performing daily functional tasks and grasping---in progress  3) Pt will demonstrate improved left forearm AROM WFL for her daily functional activities. ---in progress  4) Pt will report return to prior level of function---in progress  5) Pt will increase  strength to 45 lbs to grasp pot handle---in progress       Short Term Goals (STGs); to be met within 4 weeks (6/16/24).  1) Pt will report or demonstrate independence with HEP and brace wear---Met  2) Pt will report pain no higher than 4/10 with daily tasks---Met  3) Pt will demonstrate improved left wrist and forearm AROM by at least 20 degrees each for improved function and reach---Met      PLAN     Continue skilled occupational therapy with individualized plan of care focusing on improving motion and maximizing functional use of her hand    Updates/Grading for next session: cont to progress per protocol, with AROM    TEVIN Yang OTR/L, CHT

## 2024-07-03 ENCOUNTER — CLINICAL SUPPORT (OUTPATIENT)
Dept: REHABILITATION | Facility: HOSPITAL | Age: 39
End: 2024-07-03
Payer: COMMERCIAL

## 2024-07-03 DIAGNOSIS — M25.632 DECREASED RANGE OF MOTION OF LEFT WRIST: Primary | ICD-10-CM

## 2024-07-03 DIAGNOSIS — M25.432 SWELLING OF LEFT WRIST: ICD-10-CM

## 2024-07-03 DIAGNOSIS — M25.532 LEFT WRIST PAIN: ICD-10-CM

## 2024-07-03 PROCEDURE — 97022 WHIRLPOOL THERAPY: CPT | Mod: PO

## 2024-07-03 PROCEDURE — 97140 MANUAL THERAPY 1/> REGIONS: CPT | Mod: PO

## 2024-07-03 PROCEDURE — 97530 THERAPEUTIC ACTIVITIES: CPT | Mod: PO

## 2024-07-16 ENCOUNTER — HOSPITAL ENCOUNTER (OUTPATIENT)
Dept: RADIOLOGY | Facility: HOSPITAL | Age: 39
Discharge: HOME | End: 2024-07-16
Attending: ORTHOPAEDIC SURGERY
Payer: COMMERCIAL

## 2024-07-16 ENCOUNTER — OFFICE VISIT (OUTPATIENT)
Dept: ORTHOPEDICS | Facility: CLINIC | Age: 39
End: 2024-07-16
Payer: COMMERCIAL

## 2024-07-16 VITALS — BODY MASS INDEX: 20.1 KG/M2 | HEIGHT: 67 IN | WEIGHT: 128.06 LBS

## 2024-07-16 DIAGNOSIS — S52.502A CLOSED FRACTURE OF DISTAL END OF LEFT RADIUS, UNSPECIFIED FRACTURE MORPHOLOGY, INITIAL ENCOUNTER: ICD-10-CM

## 2024-07-16 DIAGNOSIS — S52.502A CLOSED FRACTURE OF DISTAL END OF LEFT RADIUS, UNSPECIFIED FRACTURE MORPHOLOGY, INITIAL ENCOUNTER: Primary | ICD-10-CM

## 2024-07-16 PROCEDURE — 99024 POSTOP FOLLOW-UP VISIT: CPT | Mod: S$GLB,,, | Performed by: ORTHOPAEDIC SURGERY

## 2024-07-16 PROCEDURE — 73110 X-RAY EXAM OF WRIST: CPT | Mod: TC,LT

## 2024-07-16 PROCEDURE — 73110 X-RAY EXAM OF WRIST: CPT | Mod: 26,LT,, | Performed by: INTERNAL MEDICINE

## 2024-07-16 PROCEDURE — 99999 PR PBB SHADOW E&M-EST. PATIENT-LVL II: CPT | Mod: PBBFAC,,, | Performed by: ORTHOPAEDIC SURGERY

## 2024-07-16 NOTE — PROGRESS NOTES
Nini Carter presents for post-operative evaluation.  The patient is now  2.5 months s/p Left ORIF distal radius fracture with Dr. Hughes on 5/3/24.  Overall the patient reports doing well.  She reports  no pain, denies any f/c/s. She is not taking any medications for this. Doing well and back to basically full activities.    PE:    AA&O x 4.  NAD  HEENT:  NCAT, sclera nonicteric  Lungs:  Respirations are equal and unlabored.  CV:  2+ bilateral upper and lower extremity pulses.  MSK: The wound is healing well with no signs of erythema or warmth.  There is no drainage.  No clinical signs or symptoms of infection are present.  She can make a full composite fist without difficulty and this is painless today.  Left upper extremity neurovascularly intact.  Wrist flexion and extension is   Fifty each and painless.   Pronation and supination are full and painless. SILT. DNVI.    IMAGING - reviewed with patient  Stable appearance ORIF distal radius, no hardware complications; healing nicely.    A/P: Status post above, doing well  1) at this time,   The patient is doing very well.  She may continue unrestricted activities.  Follow-up in 3 months for re-evaluation or sooner for any problems.

## 2024-07-18 NOTE — PROGRESS NOTES
[facility]  Occupational Therapy Treatment Note    Date: 7/19/2024  Name: Nini Carter  [facility] Number: [id number]    Therapy Diagnosis:   Encounter Diagnoses   Name Primary?    Decreased range of motion of left wrist Yes    Swelling of left wrist     Left wrist pain            Physician: Russo-Digeorge, Jamie L*    Physician Orders: eval and treat, and custom forearm based wrist cock up orthosis.   Medical Diagnosis: Closed fracture of distal end of left radius, unspecified fracture morphology, initial encounter [S52.502A], Postoperative state [Z98.890]   Surgical Procedure and Date: 5/3/24, Open reduction and internal fixation of left distal radius fracture,   Extra-articular.  Onset Date: 4/30/24  Evaluation Date: 5/16/2024  Insurance Authorization Period Expiration: 5/16/25  Plan of Care Certification Period: 8/26/24  Date of Return to MD: 10/17/24  Visit # / Visits authorized: 10 / 20       Precautions:  Standard and Weightbearing    Time In: 11:00 AM  Time Out: 11:45 AM  Total Billable Time: 45 minutes      SUBJECTIVE     Pt reports: The only issue she has is with weightbearing on the floor  Response to previous treatment: Lucia well    Pain: 0/10, 5/10 at worst  Location: left wrist    OBJECTIVE         FA and Wrist ROM. Measured in degrees.    5/21/2024 6/4/2024 6/20/2024 7/1/2024 7/19/2024   Supination/Pronation -30/78 -5/68 43/71 70/70 75/90   Wrist Ext/Flex 25/12 30/30 43/35 52/46 62/52   Wrist RD/UD       15/5 10/15 15/15 15/11 16/16         Hand ROM. Measured in degrees.    5/16/2024 6/4/2024 6/20/2024   Index: MP  60 WNL WNL              PIP     85  All              DIP 50  Joint               TONEY 195  Planes   Thumb: MP 28 45 I                IP 25 50 I       Rad ADD/ABD 32 40 I       Pal ADD/ABD 40 45 I          Opposition To P2 of SF SF MP I          Strength (Dyanmometer) and Pinch Strength (Pinch Gauge)  Measured in pounds and psi. Average of three trials.     6/4/2024 6/4/2024 6/20/2024 7/1/2024 7/19/2024     Right  Left  Left Left Left   Rung II 65 20 35 40 45   Zamorano Pinch 15 13 13 13 17   3pt Pinch 13 10 13 14 15           Treatment     Nini received the treatments listed below:     Supervised modalities after being cleared for contradictions:  Fluidotherapy: To L arm for 10 min,     Nini received the following manual therapy techniques for 10 minutes in order to maximize tissue function and/or decrease pain:   -Joint mobs/PROM left wrist/FA    Nini participated in dynamic functional therapeutic activities to improve functional performance for 20  minutes, including:  -Juxacisor 3'  -Tabletop PROM WF 10 count x 15  -Green flexbar WE/WF/UD/RD, frowns, smiles 3x10  -Wall pushups 3x10      Patient Education and Home Exercises      Education provided:   - Upgraded HEP  - Progress towards goals     Written Home Exercises Provided: Pt's HEP upgraded.  Exercises were reviewed and Nini was able to demonstrate them prior to the end of the session.  Nini demonstrated good  understanding of the HEP provided. See EMR under Patient Instructions for exercises provided during therapy sessions.      ASSESSMENT     Pt presents with significantly improved ROM and strength.    Nini is progressing well towards her goals and there are no updates to goals at this time. Pt prognosis is Good.     Pt will continue to benefit from skilled outpatient occupational therapy to address the deficits listed in the problem list on initial evaluation, provide pt/family education and to maximize pt's level of independence in the home and community environment.     Pt's spiritual, cultural and educational needs considered and pt agreeable to plan of care and goals.    Anticipated barriers to occupational therapy: stiffness, limited tolerance for therapy    Goals:  Long Term Goals (LTGs); to be met by discharge.  1) Pt will report pain no higher than 2/10 with daily tasks and   tasks ---in progress  2) Pt will demonstrate improved left wrist  AROM WFL for performing daily functional tasks and grasping---iMet  3) Pt will demonstrate improved left forearm AROM WFL for her daily functional activities. ---Met  4) Pt will report return to prior level of function---in progress  5) Pt will increase  strength to 45 lbs to grasp pot handle---Met       Short Term Goals (STGs); to be met within 4 weeks (6/16/24).  1) Pt will report or demonstrate independence with HEP and brace wear---Met  2) Pt will report pain no higher than 4/10 with daily tasks---Met  3) Pt will demonstrate improved left wrist and forearm AROM by at least 20 degrees each for improved function and reach---Met      PLAN     Continue skilled occupational therapy with individualized plan of care focusing on improving motion and maximizing functional use of her hand    Updates/Grading for next session: cont to progress per protocol, with AROM    TEVIN Yang OTR/L, CHT

## 2024-07-19 ENCOUNTER — CLINICAL SUPPORT (OUTPATIENT)
Dept: REHABILITATION | Facility: HOSPITAL | Age: 39
End: 2024-07-19
Payer: COMMERCIAL

## 2024-07-19 DIAGNOSIS — M25.432 SWELLING OF LEFT WRIST: ICD-10-CM

## 2024-07-19 DIAGNOSIS — M25.632 DECREASED RANGE OF MOTION OF LEFT WRIST: Primary | ICD-10-CM

## 2024-07-19 DIAGNOSIS — M25.532 LEFT WRIST PAIN: ICD-10-CM

## 2024-07-19 PROCEDURE — 97022 WHIRLPOOL THERAPY: CPT | Mod: PO

## 2024-07-19 PROCEDURE — 97140 MANUAL THERAPY 1/> REGIONS: CPT | Mod: PO

## 2024-07-19 PROCEDURE — 97530 THERAPEUTIC ACTIVITIES: CPT | Mod: PO

## 2024-07-19 NOTE — PATIENT INSTRUCTIONS
Strengthening: Wall Push-Up        With arms slightly wider apart than shoulder width, and feet 18-24 inches from wall, gently lean body toward wall.  Repeat 10 times per set. Do 3 sets per session. Do 1-2 sessions per day.

## 2024-07-22 NOTE — PROGRESS NOTES
[facility]  Occupational Therapy Treatment Note    Date: 7/23/2024  Name: Nini Carter  [facility] Number: [id number]    Therapy Diagnosis:   Encounter Diagnoses   Name Primary?    Decreased range of motion of left wrist Yes    Swelling of left wrist     Left wrist pain              Physician: Russo-Digeorge, Jamie L*    Physician Orders: eval and treat, and custom forearm based wrist cock up orthosis.   Medical Diagnosis: Closed fracture of distal end of left radius, unspecified fracture morphology, initial encounter [S52.502A], Postoperative state [Z98.890]   Surgical Procedure and Date: 5/3/24, Open reduction and internal fixation of left distal radius fracture,   Extra-articular.  Onset Date: 4/30/24  Evaluation Date: 5/16/2024  Insurance Authorization Period Expiration: 5/16/25  Plan of Care Certification Period: 8/26/24  Date of Return to MD: 10/17/24  Visit # / Visits authorized: 12 / 20       Precautions:  Standard and Weightbearing    Time In: 9:45 AM  Time Out: 10:30 AM  Total Billable Time: 45 minutes      SUBJECTIVE     Pt reports: The only issue she has is with weightbearing on the floor  Response to previous treatment: Lucia well    Pain: 0/10, 5/10 at worst  Location: left wrist    OBJECTIVE         FA and Wrist ROM. Measured in degrees.    5/21/2024 6/4/2024 6/20/2024 7/1/2024 7/19/2024   Supination/Pronation -30/78 -5/68 43/71 70/70 75/90   Wrist Ext/Flex 25/12 30/30 43/35 52/46 62/52   Wrist RD/UD       15/5 10/15 15/15 15/11 16/16         Hand ROM. Measured in degrees.    5/16/2024 6/4/2024 6/20/2024   Index: MP  60 WNL WNL              PIP     85  All              DIP 50  Joint               TONEY 195  Planes   Thumb: MP 28 45 I                IP 25 50 I       Rad ADD/ABD 32 40 I       Pal ADD/ABD 40 45 I          Opposition To P2 of SF SF MP I          Strength (Dyanmometer) and Pinch Strength (Pinch Gauge)  Measured in pounds and psi. Average of three trials.     6/4/2024 6/4/2024 6/20/2024 7/1/2024 7/19/2024     Right  Left  Left Left Left   Rung II 65 20 35 40 45   Zamorano Pinch 15 13 13 13 17   3pt Pinch 13 10 13 14 15           Treatment     Nini received the treatments listed below:     Supervised modalities after being cleared for contradictions:  Fluidotherapy: To L arm for 10 min,     Nini received the following manual therapy techniques for 10 minutes in order to maximize tissue function and/or decrease pain:   -Joint mobs/PROM left wrist/FA    Nini participated in dynamic functional therapeutic activities to improve functional performance for 20  minutes, including:  -Juxacisor 3'  -Tabletop PROM WF 10 count x 15  -Green flexbar WE/WF/UD/RD, frowns, smiles 3x10  -Wall pushups 3x10      Patient Education and Home Exercises      Education provided:   - Upgraded HEP  - Progress towards goals     Written Home Exercises Provided: Pt's HEP upgraded.  Exercises were reviewed and Nini was able to demonstrate them prior to the end of the session.  Nini demonstrated good  understanding of the HEP provided. See EMR under Patient Instructions for exercises provided during therapy sessions.      ASSESSMENT     Pt presents with significantly improved ROM and strength.    Nini is progressing well towards her goals and there are no updates to goals at this time. Pt prognosis is Good.     Pt will continue to benefit from skilled outpatient occupational therapy to address the deficits listed in the problem list on initial evaluation, provide pt/family education and to maximize pt's level of independence in the home and community environment.     Pt's spiritual, cultural and educational needs considered and pt agreeable to plan of care and goals.    Anticipated barriers to occupational therapy: stiffness, limited tolerance for therapy    Goals:  Long Term Goals (LTGs); to be met by discharge.  1) Pt will report pain no higher than 2/10 with daily tasks and   tasks ---in progress  2) Pt will demonstrate improved left wrist  AROM WFL for performing daily functional tasks and grasping---iMet  3) Pt will demonstrate improved left forearm AROM WFL for her daily functional activities. ---Met  4) Pt will report return to prior level of function---in progress  5) Pt will increase  strength to 45 lbs to grasp pot handle---Met       Short Term Goals (STGs); to be met within 4 weeks (6/16/24).  1) Pt will report or demonstrate independence with HEP and brace wear---Met  2) Pt will report pain no higher than 4/10 with daily tasks---Met  3) Pt will demonstrate improved left wrist and forearm AROM by at least 20 degrees each for improved function and reach---Met      PLAN     Continue skilled occupational therapy with individualized plan of care focusing on improving motion and maximizing functional use of her hand    Updates/Grading for next session: cont to progress per protocol, with AROM    TEVIN Yang OTR/L, CHT

## 2024-07-23 ENCOUNTER — CLINICAL SUPPORT (OUTPATIENT)
Dept: REHABILITATION | Facility: HOSPITAL | Age: 39
End: 2024-07-23
Payer: COMMERCIAL

## 2024-07-23 DIAGNOSIS — M25.632 DECREASED RANGE OF MOTION OF LEFT WRIST: Primary | ICD-10-CM

## 2024-07-23 DIAGNOSIS — M25.432 SWELLING OF LEFT WRIST: ICD-10-CM

## 2024-07-23 DIAGNOSIS — M25.532 LEFT WRIST PAIN: ICD-10-CM

## 2024-07-23 PROCEDURE — 97530 THERAPEUTIC ACTIVITIES: CPT | Mod: PO

## 2024-07-23 PROCEDURE — 97022 WHIRLPOOL THERAPY: CPT | Mod: PO

## 2024-07-23 PROCEDURE — 97140 MANUAL THERAPY 1/> REGIONS: CPT | Mod: PO

## 2024-07-29 NOTE — PROGRESS NOTES
[facility]  Occupational Therapy Treatment Note    Date: 7/30/2024  Name: Nini Carter  [facility] Number: [id number]    Therapy Diagnosis:   Encounter Diagnoses   Name Primary?    Decreased range of motion of left wrist Yes    Swelling of left wrist     Left wrist pain                Physician: Russo-Digeorge, Jamie L*    Physician Orders: eval and treat, and custom forearm based wrist cock up orthosis.   Medical Diagnosis: Closed fracture of distal end of left radius, unspecified fracture morphology, initial encounter [S52.502A], Postoperative state [Z98.890]   Surgical Procedure and Date: 5/3/24, Open reduction and internal fixation of left distal radius fracture,   Extra-articular.  Onset Date: 4/30/24  Evaluation Date: 5/16/2024  Insurance Authorization Period Expiration: 5/16/25  Plan of Care Certification Period: 8/26/24  Date of Return to MD: 10/17/24  Visit # / Visits authorized: 13 / 20       Precautions:  Standard and Weightbearing    Time In: 9:45 AM  Time Out: 10:30 AM  Total Billable Time: 45 minutes      SUBJECTIVE     Pt reports: The only issue she has is with weightbearing on the floor  Response to previous treatment: Lucia well    Pain: 0/10, 5/10 at worst  Location: left wrist    OBJECTIVE         FA and Wrist ROM. Measured in degrees.    5/21/2024 6/4/2024 6/20/2024 7/1/2024 7/19/2024 7/30/2024   Supination/Pronation -30/78 -5/68 43/71 70/70 75/90 71/89   Wrist Ext/Flex 25/12 30/30 43/35 52/46 62/52 61/56   Wrist RD/UD       15/5 10/15 15/15 15/11 16/16 10/15         Hand ROM. Measured in degrees.    5/16/2024 6/4/2024 6/20/2024   Index: MP  60 WNL WNL              PIP     85  All              DIP 50  Joint               TONEY 195  Planes   Thumb: MP 28 45 I                IP 25 50 I       Rad ADD/ABD 32 40 I       Pal ADD/ABD 40 45 I          Opposition To P2 of SF SF MP I          Strength (Dyanmometer) and Pinch Strength (Pinch Gauge)  Measured in pounds and psi.  Average of three trials.    6/4/2024 6/4/2024 6/20/2024 7/1/2024 7/19/2024 7/30/2024     Right  Left  Left Left Left Left   Rung II 65 20 35 40 45 50   Zamorano Pinch 15 13 13 13 17 19   3pt Pinch 13 10 13 14 15 14           Treatment     Nini received the treatments listed below:     Supervised modalities after being cleared for contradictions:  Fluidotherapy: To L arm for 10 min,     Nini received the following manual therapy techniques for 10 minutes in order to maximize tissue function and/or decrease pain:   -Joint mobs/PROM left wrist/FA    Nini participated in dynamic functional therapeutic activities to improve functional performance for 20  minutes, including:  -Juxacisor 3'  -Green flexbar WE/WF/UD/RD, frowns, smiles 3x10  -Wall pushups 3x10      Patient Education and Home Exercises      Education provided:   - Progress towards goals     Written Home Exercises Provided: Pt's HEP upgraded.  Exercises were reviewed and Nini was able to demonstrate them prior to the end of the session.  Nini demonstrated good  understanding of the HEP provided. See EMR under Patient Instructions for exercises provided during therapy sessions.      ASSESSMENT     Pt presents with significantly improved ROM and strength.    Nini is progressing well towards her goals and there are no updates to goals at this time. Pt prognosis is Good.     Pt will continue to benefit from skilled outpatient occupational therapy to address the deficits listed in the problem list on initial evaluation, provide pt/family education and to maximize pt's level of independence in the home and community environment.     Pt's spiritual, cultural and educational needs considered and pt agreeable to plan of care and goals.    Anticipated barriers to occupational therapy: None    Goals:  Long Term Goals (LTGs); to be met by discharge.  1) Pt will report pain no higher than 2/10 with daily tasks and  tasks ---in  progress  2) Pt will demonstrate improved left wrist  AROM WFL for performing daily functional tasks and grasping---iMet  3) Pt will demonstrate improved left forearm AROM WFL for her daily functional activities. ---Met  4) Pt will report return to prior level of function---in progress  5) Pt will increase  strength to 45 lbs to grasp pot handle---Met       Short Term Goals (STGs); to be met within 4 weeks (6/16/24).  1) Pt will report or demonstrate independence with HEP and brace wear---Met  2) Pt will report pain no higher than 4/10 with daily tasks---Met  3) Pt will demonstrate improved left wrist and forearm AROM by at least 20 degrees each for improved function and reach---Met      PLAN     Continue skilled occupational therapy with individualized plan of care focusing on improving motion and maximizing functional use of her hand    Updates/Grading for next session: cont to progress per protocol, with AROM    TEVIN Yang OTR/AU[place]UST[place], CHT

## 2024-07-30 ENCOUNTER — CLINICAL SUPPORT (OUTPATIENT)
Dept: REHABILITATION | Facility: HOSPITAL | Age: 39
End: 2024-07-30
Payer: COMMERCIAL

## 2024-07-30 DIAGNOSIS — M25.532 LEFT WRIST PAIN: ICD-10-CM

## 2024-07-30 DIAGNOSIS — M25.632 DECREASED RANGE OF MOTION OF LEFT WRIST: Primary | ICD-10-CM

## 2024-07-30 DIAGNOSIS — M25.432 SWELLING OF LEFT WRIST: ICD-10-CM

## 2024-07-30 PROCEDURE — 97022 WHIRLPOOL THERAPY: CPT | Mod: PO

## 2024-07-30 PROCEDURE — 97530 THERAPEUTIC ACTIVITIES: CPT | Mod: PO

## 2024-07-30 PROCEDURE — 97140 MANUAL THERAPY 1/> REGIONS: CPT | Mod: PO

## 2024-08-07 ENCOUNTER — CLINICAL SUPPORT (OUTPATIENT)
Dept: REHABILITATION | Facility: HOSPITAL | Age: 39
End: 2024-08-07
Payer: COMMERCIAL

## 2024-08-07 DIAGNOSIS — M25.632 DECREASED RANGE OF MOTION OF LEFT WRIST: Primary | ICD-10-CM

## 2024-08-07 DIAGNOSIS — M25.532 LEFT WRIST PAIN: ICD-10-CM

## 2024-08-07 DIAGNOSIS — M25.432 SWELLING OF LEFT WRIST: ICD-10-CM

## 2024-08-07 PROCEDURE — 97022 WHIRLPOOL THERAPY: CPT | Mod: PO

## 2024-08-07 PROCEDURE — 97530 THERAPEUTIC ACTIVITIES: CPT | Mod: PO

## 2024-08-12 NOTE — PROGRESS NOTES
[facility]  Occupational Therapy Treatment Note    Date: 8/13/2024  Name: Nini Carter  [facility] Number: [id number]    Therapy Diagnosis:   Encounter Diagnoses   Name Primary?    Decreased range of motion of left wrist Yes    Swelling of left wrist     Left wrist pain          Physician: Russo-Digeorge, Jamie L*    Physician Orders: eval and treat, and custom forearm based wrist cock up orthosis.   Medical Diagnosis: Closed fracture of distal end of left radius, unspecified fracture morphology, initial encounter [S52.502A], Postoperative state [Z98.890]   Surgical Procedure and Date: 5/3/24, Open reduction and internal fixation of left distal radius fracture,   Extra-articular.  Onset Date: 4/30/24  Evaluation Date: 5/16/2024  Insurance Authorization Period Expiration: 5/16/25  Plan of Care Certification Period: 8/26/24  Date of Return to MD: 10/17/24  Visit # / Visits authorized: 15 / 20       Precautions:  Standard and Weightbearing    Time In: 9:45 AM  Time Out: 10:30 AM  Total Billable Time: 45 minutes      SUBJECTIVE     Pt reports: Performing pushups over the weekend  Response to previous treatment: Lucia well    Pain: 0/10, 5/10 at worst  Location: left wrist    OBJECTIVE         FA and Wrist ROM. Measured in degrees.    5/21/2024 6/4/2024 6/20/2024 7/1/2024 7/19/2024 7/30/2024 8/13/2024   Supination/Pronation -30/78 -5/68 43/71 70/70 75/90 71/89 WNL   Wrist Ext/Flex 25/12 30/30 43/35 52/46 62/52 61/56 63/63   Wrist RD/UD       15/5 10/15 15/15 15/11 16/16 10/15 20/24         Hand ROM. Measured in degrees.    5/16/2024 6/4/2024 6/20/2024   Index: MP  60 WNL WNL              PIP     85  All              DIP 50  Joint               TONEY 195  Planes   Thumb: MP 28 45 I                IP 25 50 I       Rad ADD/ABD 32 40 I       Pal ADD/ABD 40 45 I          Opposition To P2 of SF SF MP I          Strength (Dyanmometer) and Pinch Strength (Pinch Gauge)  Measured in pounds and psi.  Average of three trials.    6/4/2024 6/4/2024 6/20/2024 7/1/2024 7/19/2024 7/30/2024 8/13/2024     Right  Left  Left Left Left Left Left   Rung II 65 20 35 40 45 50 54   Zamorano Pinch 15 13 13 13 17 19 18   3pt Pinch 13 10 13 14 15 14 17           Treatment     Nini received the treatments listed below:     Supervised modalities after being cleared for contradictions:  Fluidotherapy: To L arm for 10 min,     Nini participated in dynamic functional therapeutic activities to improve functional performance for 30  minutes, including:  -Juxacisor 3'  -Green flexbar  frowns, smiles 3x10  -Blue flexbar WE/WF/UD/RD 3x10  -Pink putty tooling #3 x 3'  -Hammer 1# dowel pro/sup, UD/RD 3x10  -Reviewed progress      Patient Education and Home Exercises      Education provided:   - Progress towards goals     Written Home Exercises Provided: Pt's HEP upgraded.  Exercises were reviewed and Nini was able to demonstrate them prior to the end of the session.  Nini demonstrated good  understanding of the HEP provided. See EMR under Patient Instructions for exercises provided during therapy sessions.      ASSESSMENT     Pt continues to improve w/ LUE function    Nini is progressing well towards her goals and there are no updates to goals at this time. Pt prognosis is Good.     Pt will continue to benefit from skilled outpatient occupational therapy to address the deficits listed in the problem list on initial evaluation, provide pt/family education and to maximize pt's level of independence in the home and community environment.     Pt's spiritual, cultural and educational needs considered and pt agreeable to plan of care and goals.    Anticipated barriers to occupational therapy: None    Goals:  Long Term Goals (LTGs); to be met by discharge.  1) Pt will report pain no higher than 2/10 with daily tasks and  tasks ---in progress  2) Pt will demonstrate improved left wrist  AROM WFL for performing daily  functional tasks and grasping---Met  3) Pt will demonstrate improved left forearm AROM WFL for her daily functional activities. ---Met  4) Pt will report return to prior level of function---in progress  5) Pt will increase  strength to 45 lbs to grasp pot handle---Met       Short Term Goals (STGs); to be met within 4 weeks (6/16/24).  1) Pt will report or demonstrate independence with HEP and brace wear---Met  2) Pt will report pain no higher than 4/10 with daily tasks---Met  3) Pt will demonstrate improved left wrist and forearm AROM by at least 20 degrees each for improved function and reach---Met      PLAN     Continue skilled occupational therapy with individualized plan of care focusing on improving motion and maximizing functional use of her hand    Updates/Grading for next session: cont to progress per protocol, with AROM    TEVIN Yang OTR/AU[place]USTA, CHT

## 2024-08-13 ENCOUNTER — CLINICAL SUPPORT (OUTPATIENT)
Dept: REHABILITATION | Facility: HOSPITAL | Age: 39
End: 2024-08-13
Payer: COMMERCIAL

## 2024-08-13 DIAGNOSIS — M25.432 SWELLING OF LEFT WRIST: ICD-10-CM

## 2024-08-13 DIAGNOSIS — M25.532 LEFT WRIST PAIN: ICD-10-CM

## 2024-08-13 DIAGNOSIS — M25.632 DECREASED RANGE OF MOTION OF LEFT WRIST: Primary | ICD-10-CM

## 2024-08-13 PROCEDURE — 97022 WHIRLPOOL THERAPY: CPT | Mod: PO

## 2024-08-13 PROCEDURE — 97530 THERAPEUTIC ACTIVITIES: CPT | Mod: PO

## 2024-08-19 NOTE — PROGRESS NOTES
[facility]  Occupational Therapy Treatment Note/Discharge Summary    Date: 8/20/2024  Name: Nini Carter  Clinic Number: [id number]    Therapy Diagnosis:   Encounter Diagnoses   Name Primary?    Decreased range of motion of left wrist Yes    Swelling of left wrist     Left wrist pain            Physician: Russo-Digeorge, Jamie L*    Physician Orders: eval and treat, and custom forearm based wrist cock up orthosis.   Medical Diagnosis: Closed fracture of distal end of left radius, unspecified fracture morphology, initial encounter [S52.502A], Postoperative state [Z98.890]   Surgical Procedure and Date: 5/3/24, Open reduction and internal fixation of left distal radius fracture,   Extra-articular.  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 Measured in degrees.    5/21/2024 6/4/2024 6/20/2024 7/1/2024 7/19/2024 7/30/2024 8/13/2024   Supination/Pronation -30/78 -5/68 43/71 70/70 75/90 71/89 WNL   Wrist Ext/Flex 25/12 30/30 43/35 52/46 62/52 61/56 63/63   Wrist RD/UD       15/5 10/15 15/15 15/11 16/16 10/15 20/24         Hand ROM. Measured in degrees.    5/16/2024 6/4/2024 6/20/2024   Index: MP  60 WNL WNL              PIP     85  All              DIP 50  Joint               TONEY 195  Planes   Thumb: MP 28 45 I                IP 25 50 I       Rad ADD/ABD 32 40 I       Pal ADD/ABD 40 45 I          Opposition To P2 of SF SF MP I          Strength (Dyanmometer) and Pinch Strength (Pinch Gauge)  Measured in  pounds and psi. Average of three trials.    6/4/2024 6/4/2024 6/20/2024 7/1/2024 7/19/2024 7/30/2024 8/13/2024     Right  Left  Left Left Left Left Left   Rung II 65 20 35 40 45 50 54   Zamorano Pinch 15 13 13 13 17 19 18   3pt Pinch 13 10 13 14 15 14 17           Treatment     Nini received the treatments listed below:     Supervised modalities after being cleared for contradictions:  Fluidotherapy: To L arm for 10 min,     Nini participated in dynamic functional therapeutic activities to improve functional performance for 30  minutes, including:  -Juxacisor 3'  -Blue flexbar frowns, smiles WE/WF/UD/RD 3x10  -Pink putty tooling #3 x 3'  -Hammer 1# dowel pro/sup, UD/RD 3x10  -Reviewed progress      Patient Education and Home Exercises      Education provided:   - Progress towards goals     Written Home Exercises Provided: Pt's HEP upgraded.  Exercises were reviewed and Nini was able to demonstrate them prior to the end of the session.  Nini demonstrated good  understanding of the HEP provided. See EMR under Patient Instructions for exercises provided during therapy sessions.      ASSESSMENT     Pt continues to improve w/ LUE function    Nini has met all goals and is ready to D/C.    Anticipated barriers to occupational therapy: None    Goals:  Long Term Goals (LTGs); to be met by discharge.  1) Pt will report pain no higher than 2/10 with daily tasks and  tasks ---Met  2) Pt will demonstrate improved left wrist  AROM WFL for performing daily functional tasks and grasping---Met  3) Pt will demonstrate improved left forearm AROM WFL for her daily functional activities. ---Met  4) Pt will report return to prior level of function---in progress  5) Pt will increase  strength to 45 lbs to grasp pot handle---Met       Short Term Goals (STGs); to be met within 4 weeks (6/16/24).  1) Pt will report or demonstrate independence with HEP and brace wear---Met  2) Pt will report pain no higher  than 4/10 with daily tasks---Met  3) Pt will demonstrate improved left wrist and forearm AROM by at least 20 degrees each for improved function and reach---Met      PLAN     D/C from [facility] HEWITT/[place], CHT

## 2024-08-20 ENCOUNTER — CLINICAL SUPPORT (OUTPATIENT)
Dept: REHABILITATION | Facility: HOSPITAL | Age: 39
End: 2024-08-20
Payer: COMMERCIAL

## 2024-08-20 DIAGNOSIS — M25.632 DECREASED RANGE OF MOTION OF LEFT WRIST: Primary | ICD-10-CM

## 2024-08-20 DIAGNOSIS — M25.432 SWELLING OF LEFT WRIST: ICD-10-CM

## 2024-08-20 DIAGNOSIS — M25.532 LEFT WRIST PAIN: ICD-10-CM

## 2024-08-20 PROCEDURE — 97022 WHIRLPOOL THERAPY: CPT | Mod: PO

## 2024-08-20 PROCEDURE — 97530 THERAPEUTIC ACTIVITIES: CPT | Mod: PO

## 2024-10-10 ENCOUNTER — PATIENT MESSAGE (OUTPATIENT)
Dept: ORTHOPEDICS | Facility: CLINIC | Age: 39
End: 2024-10-10
Payer: COMMERCIAL

## 2024-10-16 ENCOUNTER — TELEPHONE (OUTPATIENT)
Dept: ORTHOPEDICS | Facility: CLINIC | Age: 39
End: 2024-10-16
Payer: COMMERCIAL

## 2024-10-16 ENCOUNTER — PATIENT MESSAGE (OUTPATIENT)
Dept: ORTHOPEDICS | Facility: CLINIC | Age: 39
End: 2024-10-16
Payer: COMMERCIAL

## 2024-10-16 NOTE — TELEPHONE ENCOUNTER
LVM with appointment change information for Dr. Hughes and Lilli Lopez for 10/17/24.      Rafia Bravo MA  Medical Assistant to Dr. [facility]

## 2024-10-22 ENCOUNTER — PATIENT MESSAGE (OUTPATIENT)
Dept: ORTHOPEDICS | Facility: CLINIC | Age: 39
End: 2024-10-22
Payer: COMMERCIAL

## 2024-10-23 ENCOUNTER — OFFICE VISIT (OUTPATIENT)
Dept: ORTHOPEDICS | Facility: CLINIC | Age: 39
End: 2024-10-23
Payer: COMMERCIAL

## 2024-10-23 ENCOUNTER — HOSPITAL ENCOUNTER (OUTPATIENT)
Dept: RADIOLOGY | Facility: HOSPITAL | Age: 39
Discharge: HOME | End: 2024-10-23
Payer: COMMERCIAL

## 2024-10-23 DIAGNOSIS — M25.532 LEFT WRIST PAIN: Primary | ICD-10-CM

## 2024-10-23 DIAGNOSIS — M25.532 LEFT WRIST PAIN: ICD-10-CM

## 2024-10-23 DIAGNOSIS — S52.502A CLOSED FRACTURE OF DISTAL END OF LEFT RADIUS, UNSPECIFIED FRACTURE MORPHOLOGY, INITIAL ENCOUNTER: ICD-10-CM

## 2024-10-23 PROCEDURE — 99999 PR PBB SHADOW E&M-EST. PATIENT-LVL III: CPT | Mod: PBBFAC,,,

## 2024-10-23 PROCEDURE — 1159F MED LIST DOCD IN RCRD: CPT | Mod: CPTII,S$GLB,,

## 2024-10-23 PROCEDURE — 99213 OFFICE O/P EST LOW 20 MIN: CPT | Mod: S$GLB,,,

## 2024-10-23 PROCEDURE — 73110 X-RAY EXAM OF WRIST: CPT | Mod: TC,LT

## 2024-10-23 PROCEDURE — 73110 X-RAY EXAM OF WRIST: CPT | Mod: 26,LT,, | Performed by: RADIOLOGY

## 2024-10-23 PROCEDURE — 1160F RVW MEDS BY RX/DR IN RCRD: CPT | Mod: CPTII,S$GLB,,

## 2024-10-23 NOTE — PROGRESS NOTES
[facility]  History & Physical  Orthopedics    SUBJECTIVE:      Chief Complaint: Left wrist injury    Referring Provider: Patty Schmidt Dr. is the supervising physician for this encounter/patient    History of Present Illness:  Patient is a 39 y.o. right hand dominant female who presents today with complaints of left wrist injury occurred on 4/30/24 while roller skating. She was seen that night in the [facility] ED. Diagnosed with distal radius fracture, ortho was not consulted for reduction. She was placed in sugartong splint. She is doing well with the wrist immobilized, denies tingling into the hands. No prior hand surgery.     Interval history October 23, 2024:  The patient returns for re-evaluation.  She is now 5.5 months status post a left distal radius open reduction internal fixation with Dr. Hughes on 05/03/2024.  Overall, she reports she is doing well.  She reports a 0/10 pain today.  She does note occasionally she wakes up with volar sided wrist pain.  She describes this pain as a dull ache.  She notes she completed therapy in August.  Further, she states she has not been performing the scar massage since August.  She presents today for re-evaluation with no further complaints.    Onset of symptoms/DOI was 4/30/24.    Symptoms are aggravated by activity and movement.    Symptoms are alleviated by rest and immobilization.    Symptoms consist of pain, swelling, deformity, and decreased ROM.    The patient rates their pain as a 0/10    Attempted treatment(s) and/or interventions include activity modifications, rest, splinting/casting.     The patient denies any fevers, chills, N/V, D/C and presents for evaluation.       No past medical history on file.  Past Surgical History:   Procedure Laterality Date    OPEN REDUCTION AND INTERNAL FIXATION (ORIF) OF FRACTURE OF DISTAL RADIUS Left 5/3/2024    Procedure: ORIF, FRACTURE, RADIUS, DISTAL - LEFT poss CTR;  Surgeon:  Elvis Hughes MD;  Location: [facility];  Service: Orthopedics;  Laterality: Left;    WISDOM TOOTH EXTRACTION       Review of patient's allergies indicates:   Allergen Reactions    Hydrocodone Itching    Codeine Nausea And Vomiting and Anxiety     Social History     Social History Narrative    Not on file     Family History   Problem Relation Name Age of Onset    Breast cancer Paternal Grandmother      Cancer Paternal Grandmother      Ovarian cancer Maternal Grandmother      Cancer Maternal Grandmother      Breast cancer Maternal Aunt Theresa Mendelson     Cancer Maternal Aunt Theresa Mendelson     Colon cancer Neg Hx           Current Outpatient Medications:     acetaminophen (TYLENOL) 500 MG tablet, Take 2 tablets (1,000 mg total) by mouth 2 (two) times a day. (Patient not taking: Reported on 10/23/2024), Disp: 20 tablet, Rfl: 0    ibuprofen (ADVIL,MOTRIN) 600 MG tablet, Take 1 tablet (600 mg total) by mouth 3 (three) times daily. (Patient not taking: Reported on 10/23/2024), Disp: 20 tablet, Rfl: 0    oxyCODONE-acetaminophen (PERCOCET) 5-325 mg per tablet, Take 1 tablet by mouth every 6 (six) hours as needed for Pain. (Patient not taking: Reported on 10/23/2024), Disp: 10 tablet, Rfl: 0      Review of Systems:  Constitutional: no fever or chills  Eyes: no visual changes  ENT: no nasal congestion or sore throat  Respiratory: no cough or shortness of breath  Cardiovascular: no chest pain  Gastrointestinal: no nausea or vomiting, tolerating diet  Musculoskeletal: pain, soreness, and decreased ROM    OBJECTIVE:      Vital Signs (Most Recent):  There were no vitals filed for this visit.  There is no height or weight on file to calculate BMI.      Physical Exam:  Constitutional: The patient appears well-developed and well-nourished. No distress.   Skin: No lesions appreciated  Head: Normocephalic and atraumatic.   Nose: Nose normal.   Ears: No deformities seen  Eyes: Conjunctivae and EOM are normal.   Neck: No tracheal  deviation present.   Cardiovascular: Normal rate and intact distal pulses.    Pulmonary/Chest: Effort normal. No respiratory distress.   Abdominal: There is no guarding.   Neurological: The patient is alert.   Psychiatric: The patient has a normal mood and affect.     Left Hand/Wrist Examination:    Observation/Inspection:  Swelling  none    Deformity  none  Discoloration  none     Scars   approximately 8 cm long scar to the volar forearm of the left arm with interval healing noted; no notable dehiscence.   Atrophy  none    HAND/WRIST EXAMINATION:  Mildly tender to palpate the volar surface of the radiocarpal joint    Neurovascular Exam:  Digits WWP, brisk CR < 3s throughout  NVI motor/LTS to M/R/U nerves, radial pulse 2+    Hand range of motion is full and painless; no notable extensor tendon lag; she is able to make a full composite fist without pain    Wrist range of motion is full and painless; except with mild pain upon wrist flexion    Elbow range of motion is full and painless    Abdomen not guarded  Respirations nonlabored  Perfusion intact    Diagnostic Results:     Imaging - I independently viewed the patient's imaging as well as the radiology report.  X-rays of the patient's left wrist reveal postoperative changes of an internal fixation to the left distal radius.  Hardware is in good position.  No additional acute fractures or dislocations noted.    FINDINGS:  Postoperative changes of internal fixation of the distal radius identified.  The position and alignment is satisfactory and unchanged as compared to the previous study.    ASSESSMENT/PLAN:      39 y.o. yo female with status post left distal radius ORIF    Plan: The patient and I had a thorough discussion today.  We discussed the working diagnosis as well as several other potential alternative diagnoses.  Treatment options were discussed, both conservative and surgical, including risks/benefits of both. Surgery is highly recommended for this, which  she does agree to. She is placed in volar slab splint today.    At this time, the patient is in minimal pain and her range of motion is significantly improved at the wrist.  I recommended she continue Mederma, vitamin-E oil or cocoa butter application for gentle scar massage.  She will return to our clinic as needed or sooner for any problems.    Should the patient's symptoms worsen, persist, or fail to improve they should return for reevaluation and I would be happy to see them back anytime.    Please do not hesitate to reach out to us via email, phone, or MyChart with any questions, concerns, or feedback.       Lilli Lopez PA-C

## 2025-02-13 ENCOUNTER — OFFICE VISIT (OUTPATIENT)
Dept: OBSTETRICS AND GYNECOLOGY | Facility: CLINIC | Age: 40
End: 2025-02-13
Payer: COMMERCIAL

## 2025-02-13 VITALS
DIASTOLIC BLOOD PRESSURE: 70 MMHG | BODY MASS INDEX: 20.07 KG/M2 | SYSTOLIC BLOOD PRESSURE: 110 MMHG | WEIGHT: 127.88 LBS | HEIGHT: 67 IN

## 2025-02-13 DIAGNOSIS — Z12.31 ENCOUNTER FOR SCREENING MAMMOGRAM FOR BREAST CANCER: ICD-10-CM

## 2025-02-13 DIAGNOSIS — Z01.419 ENCOUNTER FOR GYNECOLOGICAL EXAMINATION: Primary | ICD-10-CM

## 2025-02-13 PROCEDURE — 99999 PR PBB SHADOW E&M-EST. PATIENT-LVL III: CPT | Mod: PBBFAC,,, | Performed by: OBSTETRICS & GYNECOLOGY

## 2025-02-13 PROCEDURE — 3008F BODY MASS INDEX DOCD: CPT | Mod: CPTII,S$GLB,, | Performed by: OBSTETRICS & GYNECOLOGY

## 2025-02-13 PROCEDURE — 99396 PREV VISIT EST AGE 40-64: CPT | Mod: S$GLB,,, | Performed by: OBSTETRICS & GYNECOLOGY

## 2025-02-13 PROCEDURE — 3074F SYST BP LT 130 MM HG: CPT | Mod: CPTII,S$GLB,, | Performed by: OBSTETRICS & GYNECOLOGY

## 2025-02-13 PROCEDURE — 3078F DIAST BP <80 MM HG: CPT | Mod: CPTII,S$GLB,, | Performed by: OBSTETRICS & GYNECOLOGY

## 2025-02-13 PROCEDURE — 1159F MED LIST DOCD IN RCRD: CPT | Mod: CPTII,S$GLB,, | Performed by: OBSTETRICS & GYNECOLOGY

## 2025-02-13 RX ORDER — PROGESTERONE 100 MG/1
CAPSULE ORAL
COMMUNITY
Start: 2025-01-09

## 2025-02-13 NOTE — PROGRESS NOTES
Subjective:       Patient ID: Nini Carter is a 39 y.o. female.    Chief Complaint:  Annual Exam (Last pap/hpv: 10/2023 Normal ; No mmg on file)      History of Present Illness  - here for annual. No complaints. Cycling on progesterone because she was starting to have spotting between periods.    No past medical history on file.    Past Surgical History:   Procedure Laterality Date    OPEN REDUCTION AND INTERNAL FIXATION (ORIF) OF FRACTURE OF DISTAL RADIUS Left 5/3/2024    Procedure: ORIF, FRACTURE, RADIUS, DISTAL - LEFT poss CTR;  Surgeon: Elvis Hughes MD;  Location: [facility];  Service: Orthopedics;  Laterality: Left;    WISDOM TOOTH EXTRACTION          Family History   Problem Relation Name Age of Onset    Breast cancer Paternal Grandmother      Cancer Paternal Grandmother      Ovarian cancer Maternal Grandmother      Cancer Maternal Grandmother      Breast cancer Maternal Aunt Rhona Mendelson     Cancer Maternal Aunt Rhonasa Mendelson     Colon cancer Neg Hx          Social History     Socioeconomic History    Marital status:    Tobacco Use    Smoking status: Never    Smokeless tobacco: Never   Substance and Sexual Activity    Alcohol use: Not Currently    Drug use: No    Sexual activity: Yes     Birth control/protection: None     Comment: Engaged:  wedding 11-     Social Drivers of Health     Financial Resource Strain: Low Risk  (8/10/2022)    Received from "[employer] [facility] [place] [facility]    Overall Financial Resource Strain (CARDIA)     Difficulty of Paying Living Expenses: Not very hard   Food Insecurity: No Food Insecurity (8/10/2022)    Received from "[employer] [facility] [place] [facility]    Hunger Vital Sign     Worried About Running Out of Food in the Last Year: Never true     Ran Out of Food in the Last Year: Never true   Transportation Needs: No Transportation Needs (8/10/2022)    Received from "[employer] [facility] [place] [facility]    PRAPARE - Transportation     Lack of Transportation (Medical): No     Lack of  "Transportation (Non-Medical): No   Physical Activity: Insufficiently Active (8/10/2022)    Received from [facility] [place]    Exercise Vital Sign     Days of Exercise per Week: 3 days     Minutes of Exercise per Session: 20 min   Stress: No Stress Concern Present (8/10/2022)    Received from [facility] [employer] [facility]    [employer] [place] [employer] - Occupational Stress Questionnaire     Feeling of Stress : Not at all   Housing Stability: Low Risk  (8/10/2022)    Received from [facility]    Housing Stability Vital Sign     Unable to Pay for Housing in the Last Year: No     Number of Places Lived in the Last Year: 1     Unstable Housing in the Last Year: No           Objective:     Vitals:    02/13/25 1044   BP: 110/70   Patient Position: Sitting   Weight: 58 kg (127 lb 14.4 oz)   Height: 5' 7" (1.702 m)       Physical Exam:   Constitutional: She is oriented to person, place, and time. She appears well-developed and well-nourished.        Pulmonary/Chest: Right breast exhibits no mass, no nipple discharge, no skin change, no tenderness and no swelling. Left breast exhibits no mass, no nipple discharge, no skin change, no tenderness and no swelling. Breasts are symmetrical.        Abdominal: Soft. She exhibits no distension. There is no abdominal tenderness.     Genitourinary:    Vagina and uterus normal.   There is no tenderness or lesion on the right labia. There is no tenderness or lesion on the left labia. Cervix is normal. Right adnexum displays no mass, no tenderness and no fullness. Left adnexum displays no mass, no tenderness and no fullness. No vaginal discharge in the vagina.           Musculoskeletal: Moves all extremeties.       Neurological: She is alert and oriented to person, place, and time.     Psychiatric: She has a normal mood and affect.      A female chaperone was present for exam.    Assessment/ Plan:     Orders Placed This Encounter    Mammo Digital Screening " Bilat w/ Terrence ([facility])       Nini was seen today for annual exam.    Diagnoses and all orders for this visit:    Encounter for gynecological examination    Encounter for screening mammogram for breast cancer  -     Mammo Digital Screening Bilat w/ Terrence (XPD); Future        Follow up in about 1 year (around 2/13/2026) for annual exam.    As of April 1, 2021, the Cures Act has been passed nationally. This new law requires that all doctors progress notes, lab results, pathology reports and radiology reports be released IMMEDIATELY to the patient in the patient portal. That means that the results are released to you at the EXACT same time they are released to me. Therefore, with all of the patients that I have I am not able to reply to each patient exactly when the results come in. So there will be a delay from when you see the results to when I see them and have time to come up with a response to send you. Also I only see these results when I am on the computer at work. So if the results come in over the weekend or after 5 pm of a work day, I will not see them until the next business day. As you can tell, this is a challenge as a physician to give every patient the quick response they hope for and deserve. So please be patient!   Thanks for your understanding and patience.

## 2025-03-07 ENCOUNTER — HOSPITAL ENCOUNTER (OUTPATIENT)
Dept: RADIOLOGY | Facility: HOSPITAL | Age: 40
Discharge: HOME | End: 2025-03-07
Attending: OBSTETRICS & GYNECOLOGY
Payer: COMMERCIAL

## 2025-03-07 VITALS — BODY MASS INDEX: 19.93 KG/M2 | HEIGHT: 67 IN | WEIGHT: 127 LBS

## 2025-03-07 DIAGNOSIS — Z12.31 ENCOUNTER FOR SCREENING MAMMOGRAM FOR BREAST CANCER: ICD-10-CM

## 2025-03-07 PROCEDURE — 77063 BREAST TOMOSYNTHESIS BI: CPT | Mod: TC

## 2025-03-07 PROCEDURE — 77063 BREAST TOMOSYNTHESIS BI: CPT | Mod: 26,,, | Performed by: RADIOLOGY

## 2025-03-07 PROCEDURE — 77067 SCR MAMMO BI INCL CAD: CPT | Mod: 26,,, | Performed by: RADIOLOGY

## 2025-03-11 ENCOUNTER — RESULTS FOLLOW-UP (OUTPATIENT)
Dept: OBSTETRICS AND GYNECOLOGY | Facility: CLINIC | Age: 40
End: 2025-03-11

## 2025-03-11 ENCOUNTER — TELEPHONE (OUTPATIENT)
Dept: OBSTETRICS AND GYNECOLOGY | Facility: CLINIC | Age: 40
End: 2025-03-11
Payer: COMMERCIAL

## 2025-03-11 DIAGNOSIS — R92.8 ABNORMAL MAMMOGRAM OF RIGHT BREAST: Primary | ICD-10-CM

## 2025-03-11 NOTE — TELEPHONE ENCOUNTER
----- Message from Tala Osuna MD sent at 3/11/2025 10:49 AM CDT -----  Spoke with patient. I see that dx mmg is ordered. Please also order limited u/s of right breast in case she needs that. She received a call to schedule and is working on childcare.   ----- Message -----  From: Dash Trujillo MD  Sent: 3/10/2025   9:30 AM CDT  To: Tala Osuna MD

## 2025-03-19 ENCOUNTER — HOSPITAL ENCOUNTER (OUTPATIENT)
Dept: RADIOLOGY | Facility: HOSPITAL | Age: 40
Discharge: HOME | End: 2025-03-19
Attending: OBSTETRICS & GYNECOLOGY
Payer: COMMERCIAL

## 2025-03-19 DIAGNOSIS — R92.8 ABNORMAL MAMMOGRAM: ICD-10-CM

## 2025-03-19 PROCEDURE — 76642 ULTRASOUND BREAST LIMITED: CPT | Mod: TC,RT

## 2025-03-19 PROCEDURE — 77061 BREAST TOMOSYNTHESIS UNI: CPT | Mod: 26,RT,, | Performed by: RADIOLOGY

## 2025-03-19 PROCEDURE — 77061 BREAST TOMOSYNTHESIS UNI: CPT | Mod: TC,RT

## 2025-03-19 PROCEDURE — 76642 ULTRASOUND BREAST LIMITED: CPT | Mod: 26,RT,, | Performed by: RADIOLOGY

## 2025-03-19 PROCEDURE — 77065 DX MAMMO INCL CAD UNI: CPT | Mod: 26,RT,, | Performed by: RADIOLOGY

## 2025-03-20 ENCOUNTER — RESULTS FOLLOW-UP (OUTPATIENT)
Dept: OBSTETRICS AND GYNECOLOGY | Facility: CLINIC | Age: 40
End: 2025-03-20

## 2025-03-20 DIAGNOSIS — R92.8 ABNORMAL MAMMOGRAM: Primary | ICD-10-CM

## 2025-03-20 DIAGNOSIS — Z91.89 INCREASED RISK OF BREAST CANCER: ICD-10-CM

## (undated) DEVICE — COVER LIGHT HANDLE 80/CA

## (undated) DEVICE — GLOVE BIOGEL PI MICRO SZ 7.5

## (undated) DEVICE — SPLINT PLASTER F.S 4INX15IN

## (undated) DEVICE — TIP DRIVER HEX LOCK GROOVE 1.5

## (undated) DEVICE — UNDERGLOVES BIOGEL PI SIZE 8

## (undated) DEVICE — SLING ARM LARGE FOAM STRAP

## (undated) DEVICE — 3.5MM X 15MM NON-LOCKING HEXALOBE SCREW
Type: IMPLANTABLE DEVICE | Site: HAND | Status: NON-FUNCTIONAL
Brand: ACUMED
Removed: 2024-05-03

## (undated) DEVICE — Device

## (undated) DEVICE — BANDAGE MATRIX HK LOOP 4IN 5YD

## (undated) DEVICE — SUT VICRYL 3-0 27 SH

## (undated) DEVICE — DRAPE C-ARM MINI DISP

## (undated) DEVICE — TOURNIQUET SB QC DP 18X4IN

## (undated) DEVICE — BIT DRILL QUICK RELEASE 2.0MM

## (undated) DEVICE — PAD CAST SPECIALIST STRL 4

## (undated) DEVICE — GUIDEWIRE ORTHO .054 X 6 IN
Type: IMPLANTABLE DEVICE | Site: HAND | Status: NON-FUNCTIONAL
Removed: 2024-05-03

## (undated) DEVICE — SOCKINETTE DOUBLE PLY 4X48IN

## (undated) DEVICE — SPONGE COTTON TRAY 4X4IN

## (undated) DEVICE — GOWN ECLIPSE REINF LVL4 TWL XL

## (undated) DEVICE — SCREW BNE N LOK HEXLB 3.5X14
Type: IMPLANTABLE DEVICE | Site: HAND | Status: NON-FUNCTIONAL
Removed: 2024-05-03

## (undated) DEVICE — DRESSING XEROFORM NONADH 1X8IN

## (undated) DEVICE — DRAPE STERI-DRAPE 1000 17X11IN

## (undated) DEVICE — SOL IRR SOD CHL .9% POUR

## (undated) DEVICE — DRAPE U SPLIT SHEET 54X76IN

## (undated) DEVICE — COVER CAMERA OPERATING ROOM

## (undated) DEVICE — SUT ETHILON 3-0 PS2 18 BLK

## (undated) DEVICE — DRILL QUICK RELEASE 2.8MM 5IN